# Patient Record
Sex: FEMALE | Race: WHITE | Employment: OTHER | RURAL
[De-identification: names, ages, dates, MRNs, and addresses within clinical notes are randomized per-mention and may not be internally consistent; named-entity substitution may affect disease eponyms.]

---

## 2017-02-10 DIAGNOSIS — I10 ESSENTIAL HYPERTENSION: ICD-10-CM

## 2017-02-10 RX ORDER — LISINOPRIL 20 MG/1
TABLET ORAL
Qty: 30 TAB | Refills: 0 | Status: SHIPPED | OUTPATIENT
Start: 2017-02-10 | End: 2017-03-13 | Stop reason: SDUPTHER

## 2017-03-13 DIAGNOSIS — I10 ESSENTIAL HYPERTENSION: ICD-10-CM

## 2017-03-13 RX ORDER — LISINOPRIL 20 MG/1
TABLET ORAL
Qty: 30 TAB | Refills: 0 | Status: SHIPPED | OUTPATIENT
Start: 2017-03-13 | End: 2017-04-13 | Stop reason: SDUPTHER

## 2017-04-13 DIAGNOSIS — I10 ESSENTIAL HYPERTENSION: ICD-10-CM

## 2017-04-14 RX ORDER — LISINOPRIL 20 MG/1
TABLET ORAL
Qty: 90 TAB | Refills: 1 | Status: SHIPPED | OUTPATIENT
Start: 2017-04-14 | End: 2017-10-09 | Stop reason: SDUPTHER

## 2017-04-25 ENCOUNTER — OFFICE VISIT (OUTPATIENT)
Dept: FAMILY MEDICINE CLINIC | Age: 82
End: 2017-04-25

## 2017-04-25 VITALS
RESPIRATION RATE: 16 BRPM | BODY MASS INDEX: 34.74 KG/M2 | TEMPERATURE: 96.9 F | DIASTOLIC BLOOD PRESSURE: 61 MMHG | OXYGEN SATURATION: 96 % | HEIGHT: 61 IN | WEIGHT: 184 LBS | HEART RATE: 65 BPM | SYSTOLIC BLOOD PRESSURE: 128 MMHG

## 2017-04-25 DIAGNOSIS — E66.9 OBESITY, CLASS II, BMI 35-39.9: ICD-10-CM

## 2017-04-25 DIAGNOSIS — I10 ESSENTIAL HYPERTENSION: ICD-10-CM

## 2017-04-25 DIAGNOSIS — M17.12 PRIMARY OSTEOARTHRITIS OF LEFT KNEE: Primary | ICD-10-CM

## 2017-04-25 NOTE — MR AVS SNAPSHOT
Visit Information Date & Time Provider Department Dept. Phone Encounter #  
 4/25/2017  9:30 AM Pipo Hernandez MD [address] [place], [place] [address]artie Stanhope [id number] Follow-up Instructions Return in about 6 months (around 10/25/2017) for HTN. Upcoming Health Maintenance Date Due OSTEOPOROSIS SCREENING (DEXA) 1/6/1999 MEDICARE YEARLY EXAM 7/20/2017 Pneumococcal 65+ Low/Medium Risk (2 of 2 - PPSV23) 7/22/2017 GLAUCOMA SCREENING Q2Y 4/5/2018 DTaP/Tdap/Td series (2 - Td) 4/22/2022 Allergies as of 4/25/2017  Review Complete On: 4/25/2017 By: Pipo Hernandez MD  
 No Known Allergies Current Immunizations  Reviewed on 10/27/2015 Name Date Influenza Vaccine 10/27/2015, 11/18/2014, 11/12/2013 Influenza Vaccine (Quad) PF 10/4/2016 Influenza Vaccine Split 10/6/2011, 10/29/2010 Pneumococcal Conjugate (PCV-13) 7/22/2016 TDAP Vaccine 4/22/2012  7:52 PM  
  
 Not reviewed this visit You Were Diagnosed With   
  
 Codes Comments Primary osteoarthritis of left knee    -  Primary ICD-10-CM: M17.12 
ICD-9-CM: 715.16 Essential hypertension     ICD-10-CM: I10 
ICD-9-CM: 401.9 Obesity, Class II, BMI 35-39.9 (HCC)     ICD-10-CM: E66.9 ICD-9-CM: 278.00 Vitals BP Pulse Temp Resp Height(growth percentile) Weight(growth percentile) 128/61 (BP 1 Location: Right arm, BP Patient Position: Sitting) 65 96.9 °F (36.1 °C) (Oral) 16 5' 1\" (1.549 m) 184 lb (83.5 kg) LMP SpO2 BMI OB Status Smoking Status 01/01/1986 96% 34.77 kg/m2 Postmenopausal Never Smoker Vitals History BMI and BSA Data Body Mass Index Body Surface Area 34.77 kg/m 2 1.9 m 2 Preferred Pharmacy Pharmacy Name Phone [facility] [address] [facility] [address] [phone] Your Updated Medication List  
  
   
This list is accurate as of: 4/25/17 10:15 AM.  Always use your most recent med list.  
  
  
  
  
 ADULT PROBIOTIC PO Take  by mouth. CURCUMIN Take  by mouth daily. lisinopril 20 mg tablet Commonly known as:  PRINIVIL, ZESTRIL  
TAKE ONE TABLET BY MOUTH ONCE DAILY FOR  HYPERTENSION  
  
 OTHER Ultimate Colon Care Formula PREVNAR 13 (PF) 0.5 mL Syrg injection Generic drug:  pneumococcal 13 karin conj dip  
  
 varicella-zoster vacine live 19,400 unit/0.65 mL Susr injection Generic drug:  varicella zoster vacine live We Performed the Following LIPID PANEL [53165 CPT(R)] METABOLIC PANEL, BASIC [32666 CPT(R)] Follow-up Instructions Return in about 6 months (around 10/25/2017) for HTN. To-Do List   
 07/19/2017 11:00 AM  
  Appointment with Karen Del Valle at [facility] ([phone]) Please provide this summary of care documentation to your next provider. Your primary care clinician is listed as Karin Brantley. If you have any questions after today's visit, please call [phone].

## 2017-04-25 NOTE — PROGRESS NOTES
Reviewed record in preparation for visit and have necessary documentation  Pt did not bring medication to office visit for review  opportunity was given for questions  Goals that were addressed and/or need to be completed during or after this appointment include    Health Maintenance Due   Topic Date Due    OSTEOPOROSIS SCREENING (DEXA)  01/06/1999

## 2017-04-25 NOTE — PROGRESS NOTES
HPI:    Knee Pain  Gil Flores is a 80 y.o. female who presents with left  knee pain. Pain started 1 month ago. Sharp pain and \"popping noise. \" Pain started after she ran out of turmeric. Patient was able to refill medication 1 week ago and since then all pain has resolved. Denies trauma, swelling, erythema or joint. Hypertension: Controlled   BP Readings from Last 3 Encounters:   04/25/17 128/61   10/04/16 139/63   08/16/16 131/53     The patient reports:  taking medications as instructed, no medication side effects noted, no TIA's, no chest pain on exertion, no dyspnea on exertion, no swelling of ankles. Lab Results   Component Value Date/Time    Sodium 136 10/05/2016 01:43 PM    Potassium >10.0 10/05/2016 01:43 PM    Chloride 101 10/05/2016 01:43 PM    CO2 26 10/05/2016 01:43 PM    Anion gap 11 04/22/2012 07:41 PM    Glucose 78 10/05/2016 01:43 PM    BUN 10 10/05/2016 01:43 PM    Creatinine 0.92 10/05/2016 01:43 PM    BUN/Creatinine ratio 11 10/05/2016 01:43 PM    GFR est AA 67 10/05/2016 01:43 PM    GFR est non-AA 58 10/05/2016 01:43 PM    Calcium 10.3 10/05/2016 01:43 PM    Bilirubin, total 0.4 10/05/2016 01:43 PM    AST (SGOT) 19 10/05/2016 01:43 PM    Alk. phosphatase 65 10/05/2016 01:43 PM    Protein, total 6.8 10/05/2016 01:43 PM    Albumin 4.1 10/05/2016 01:43 PM    Globulin 3.2 12/30/2009 08:39 AM    A-G Ratio 1.5 10/05/2016 01:43 PM    ALT (SGPT) 12 10/05/2016 01:43 PM     Our goal is to normalize the blood pressure to decrease the risks of strokes and heart attacks. The patient is in agreement with the plan. Past Medical History:   Diagnosis Date    Hypertension     IBS (irritable bowel syndrome)     Tubular adenoma 10/2/2012       Current Outpatient Prescriptions:     TURMERIC (CURCUMIN), Take  by mouth daily. , Disp: , Rfl:     lisinopril (PRINIVIL, ZESTRIL) 20 mg tablet, TAKE ONE TABLET BY MOUTH ONCE DAILY FOR  HYPERTENSION, Disp: 90 Tab, Rfl: 1    LACTOBACILLUS COMBINATION NO.8 (ADULT PROBIOTIC PO), Take  by mouth., Disp: , Rfl:     OTHER, Ultimate Colon Care Formula , Disp: , Rfl:     VARICELLA-ZOSTER VACINE LIVE 19,400 unit/0.65 mL susr injection, , Disp: , Rfl:     PREVNAR 13, PF, 0.5 mL syrg injection, , Disp: , Rfl:   No Known Allergies  Past Medical History:   Diagnosis Date    Hypertension     IBS (irritable bowel syndrome)     Tubular adenoma 10/2/2012     Family History   Problem Relation Age of Onset    Cancer Mother [de-identified]     breast cancer       ROS: As per HPI otherwise negative. Objective:   Visit Vitals    /61 (BP 1 Location: Right arm, BP Patient Position: Sitting)    Pulse 65    Temp 96.9 °F (36.1 °C) (Oral)    Resp 16    Ht 5' 1\" (1.549 m)    Wt 184 lb (83.5 kg)    LMP 01/01/1986    SpO2 96%    BMI 34.77 kg/m2     Gen: Well appearing. No apparent distress. Alert and oriented. Responds to all questions appropriately. Lungs: No labored respirations. Talking in complete sentences without difficulty.   Musculoskeletal:  Knee: left  Knee Effusion: None  Quadriceps atrophy: None   Popliteal (Bakers) Cyst: Negative   Patellofemoral crepitus: Negative  Q angle:     ROM:  Flexion: 130  Extension: 0   Hip IR/ER: FROM without pain    Alignment:  Foot:   Patellar tracking:     Dynamic Test:  Gait: Normal   Assistive devices: None  One leg squat:     Palpation:   Patella tenderness: None  Patellar tendon tenderness: None  Quad tendon tenderness: None  Medial joint line tenderness: None  Lateral joint line tenderness: None  MCL tenderness: None  LCL tenderness: None  Medial facet tenderness: None  Lateral facet tenderness: None  Condyle tenderness: None  Tibia tubercle tenderness: None  Proximal fibula tenderness: None  Plica tenderness: None  Prepatellar bursa tenderness: None  Pes bursa tenderness: None  ITB tenderness: None    Ligament/Meniscal Exam:  Patellar Grind: Negative   Patellar apprehension (medial/lateral): Negative   Lochman: Negative, with good endpoint   Anterior Drawer: Negative   Posterior Drawer: Negative   Valgus stress: Negative with good endpoint   Varus stress: Negative with good endpoint   Dial test: Negative   Deandre: Negative   Luciano sign: Negative. Strength (0-5/5):   Flexion: Left: 5/5    Right: 5/5    Extension: Left: 5/5    Right: 5/5    Hip abduction: 5/5    Hip adduction: 5/5      Neuro/Vascular : Pulses intact, no edema, and neurologically intact . Skin: No obvious rash or skin breakdown. ASSESSMENT:    ICD-10-CM ICD-9-CM    1. Primary osteoarthritis of left knee M17.12 715.16    2. Essential hypertension M81 762.8 METABOLIC PANEL, BASIC      LIPID PANEL   3. Obesity, Class II, BMI 35-39.9 ([facility]) E66.9 278.00 LIPID PANEL       1. Primary osteoarthritis of left knee  Patient is controlling pain with turmeric. 2. Essential hypertension  Well controlled continue current medication.   - METABOLIC PANEL, BASIC  - LIPID PANEL - non-fasting. 3. Obesity, Class II, BMI 35-39.9 ([facility])  Body mass index is 34.77 kg/(m^2). I have reviewed/discussed the above normal BMI with the patient. I have recommended the following interventions: dietary management education, guidance, and counseling and encourage exercise . Kimberly Line - LIPID PANEL    Follow-up Disposition:  Return in about 6 months (around 10/25/2017) for HTN.

## 2017-04-26 LAB
BUN SERPL-MCNC: 13 MG/DL (ref 8–27)
BUN/CREAT SERPL: 15 (ref 12–28)
CALCIUM SERPL-MCNC: 10.1 MG/DL (ref 8.7–10.3)
CHLORIDE SERPL-SCNC: 103 MMOL/L (ref 96–106)
CHOLEST SERPL-MCNC: 153 MG/DL (ref 100–199)
CO2 SERPL-SCNC: 24 MMOL/L (ref 18–29)
CREAT SERPL-MCNC: 0.84 MG/DL (ref 0.57–1)
GLUCOSE SERPL-MCNC: 73 MG/DL (ref 65–99)
HDLC SERPL-MCNC: 39 MG/DL
LDLC SERPL CALC-MCNC: 80 MG/DL (ref 0–99)
POTASSIUM SERPL-SCNC: 4.5 MMOL/L (ref 3.5–5.2)
SODIUM SERPL-SCNC: 143 MMOL/L (ref 134–144)
TRIGL SERPL-MCNC: 169 MG/DL (ref 0–149)
VLDLC SERPL CALC-MCNC: 34 MG/DL (ref 5–40)

## 2017-05-09 ENCOUNTER — OFFICE VISIT (OUTPATIENT)
Dept: FAMILY MEDICINE CLINIC | Age: 82
End: 2017-05-09

## 2017-05-09 VITALS
WEIGHT: 184 LBS | HEIGHT: 61 IN | RESPIRATION RATE: 20 BRPM | BODY MASS INDEX: 34.74 KG/M2 | TEMPERATURE: 96.5 F | HEART RATE: 101 BPM | SYSTOLIC BLOOD PRESSURE: 131 MMHG | DIASTOLIC BLOOD PRESSURE: 66 MMHG | OXYGEN SATURATION: 95 %
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
## 2017-05-09 DIAGNOSIS — M25.522 ELBOW PAIN, LEFT: ICD-10-CM

## 2017-05-09 DIAGNOSIS — N28.1 BILATERAL RENAL CYSTS: ICD-10-CM

## 2017-05-09 DIAGNOSIS — D32.9 MENINGIOMA (HCC): ICD-10-CM
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
## 2017-05-09 RX ORDER — TRAMADOL HYDROCHLORIDE 50 MG/1
50 TABLET ORAL
Qty: 14 TAB | Refills: 0 | Status: SHIPPED | OUTPATIENT
Start: 2017-05-09 | End: 2017-05-16

## 2017-05-09 NOTE — PROGRESS NOTES
Reviewed record in preparation for visit and have necessary documentation  Pt did not bring medication to office visit for review   Advanced Directives, Living Will on file    Goals that were addressed and/or need to be completed during or after this appointment include   Health Maintenance Due   Topic Date Due    OSTEOPOROSIS SCREENING (DEXA)  01/06/1999

## 2017-05-09 NOTE — PATIENT INSTRUCTIONS
IBUPROFEN: 3 x200mg, three times a day    TYLENOL: 2x 325mg, three times a day       Preventing Falls: Care Instructions  Your Care Instructions  Getting around your home safely can be a challenge if you have injuries or health problems that make it easy for you to fall. Loose rugs and furniture in walkways are among the dangers for many older people who have problems walking or who have poor eyesight. People who have conditions such as arthritis, osteoporosis, or dementia also have to be careful not to fall. You can make your home safer with a few simple measures. Follow-up care is a key part of your treatment and safety. Be sure to make and go to all appointments, and call your doctor if you are having problems. It's also a good idea to know your test results and keep a list of the medicines you take. How can you care for yourself at home? Taking care of yourself  · You may get dizzy if you do not drink enough water. To prevent dehydration, drink plenty of fluids, enough so that your urine is light yellow or clear like water. Choose water and other caffeine-free clear liquids. If you have kidney, heart, or liver disease and have to limit fluids, talk with your doctor before you increase the amount of fluids you drink. · Exercise regularly to improve your strength, muscle tone, and balance. Walk if you can. Swimming may be a good choice if you cannot walk easily. · Have your vision and hearing checked each year or any time you notice a change. If you have trouble seeing and hearing, you might not be able to avoid objects and could lose your balance. · Know the side effects of the medicines you take. Ask your doctor or pharmacist whether the medicines you take can affect your balance. Sleeping pills or sedatives can affect your balance. · Limit the amount of alcohol you drink. Alcohol can impair your balance and other senses. · Ask your doctor whether calluses or corns on your feet need to be removed.  If you wear loose-fitting shoes because of calluses or corns, you can lose your balance and fall. · Talk to your doctor if you have numbness in your feet. Preventing falls at home  · Remove raised doorway thresholds, throw rugs, and clutter. Repair loose carpet or raised areas in the floor. · Move furniture and electrical cords to keep them out of walking paths. · Use nonskid floor wax, and wipe up spills right away, especially on ceramic tile floors. · If you use a walker or cane, put rubber tips on it. If you use crutches, clean the bottoms of them regularly with an abrasive pad, such as steel wool. · Keep your house well lit, especially Fredie Cumins, and outside walkways. Use night-lights in areas such as hallways and bathrooms. Add extra light switches or use remote switches (such as switches that go on or off when you clap your hands) to make it easier to turn lights on if you have to get up during the night. · Install sturdy handrails on stairways. · Move items in your cabinets so that the things you use a lot are on the lower shelves (about waist level). · Keep a cordless phone and a flashlight with new batteries by your bed. If possible, put a phone in each of the main rooms of your house, or carry a cell phone in case you fall and cannot reach a phone. Or, you can wear a device around your neck or wrist. You push a button that sends a signal for help. · Wear low-heeled shoes that fit well and give your feet good support. Use footwear with nonskid soles. Check the heels and soles of your shoes for wear. Repair or replace worn heels or soles. · Do not wear socks without shoes on wood floors. · Walk on the grass when the sidewalks are slippery. If you live in an area that gets snow and ice in the winter, sprinkle salt on slippery steps and sidewalks. Preventing falls in the bath  · Install grab bars and nonskid mats inside and outside your shower or tub and near the toilet and sinks.   · Use shower chairs and bath benches. · Use a hand-held shower head that will allow you to sit while showering. · Get into a tub or shower by putting the weaker leg in first. Get out of a tub or shower with your strong side first.  · Repair loose toilet seats and consider installing a raised toilet seat to make getting on and off the toilet easier. · Keep your bathroom door unlocked while you are in the shower. Where can you learn more? Go to http://pily-brandon.info/. Enter [phone] in the search box to learn more about \"Preventing Falls: Care Instructions. \"  Current as of: August 4, 2016  Content Version: 11.2  © 6978-1349 [employer]. Care instructions adapted under license by [employer] (which disclaims liability or warranty for this information). If you have questions about a medical condition or this instruction, always ask your healthcare professional. [place] [address] any warranty or liability for your use of this information.

## 2017-05-09 NOTE — MR AVS SNAPSHOT
Visit Information Date & Time Provider Department Dept. Phone Encounter #  
 5/9/2017  9:40 AM Afshan Marcus MD [address] [place] [address] [id number] Upcoming Health Maintenance Date Due OSTEOPOROSIS SCREENING (DEXA) 1/6/1999 MEDICARE YEARLY EXAM 7/20/2017 Pneumococcal 65+ Low/Medium Risk (2 of 2 - PPSV23) 7/22/2017 INFLUENZA AGE 9 TO ADULT 8/1/2017 GLAUCOMA SCREENING Q2Y 4/5/2018 DTaP/Tdap/Td series (2 - Td) 4/22/2022 Allergies as of 5/9/2017  Review Complete On: 5/9/2017 By: Agustina Song LPN No Known Allergies Current Immunizations  Reviewed on 10/27/2015 Name Date Influenza Vaccine 10/27/2015, 11/18/2014, 11/12/2013 Influenza Vaccine (Quad) PF 10/4/2016 Influenza Vaccine Split 10/6/2011, 10/29/2010 Pneumococcal Conjugate (PCV-13) 7/22/2016 TDAP Vaccine 4/22/2012  7:52 PM  
 Zoster Vaccine, Live 10/4/2016 Not reviewed this visit Vitals BP Pulse Temp Resp Height(growth percentile) Weight(growth percentile) 131/66 (BP 1 Location: Right arm, BP Patient Position: Sitting) (!) 101 96.5 °F (35.8 °C) (Oral) 20 5' 1\" (1.549 m) 184 lb (83.5 kg) LMP SpO2 BMI OB Status Smoking Status 01/01/1986 95% 34.77 kg/m2 Postmenopausal Never Smoker Vitals History BMI and BSA Data Body Mass Index Body Surface Area 34.77 kg/m 2 1.9 m 2 Preferred Pharmacy Pharmacy Name Phone [facility] [address] [place] [address] [phone] Your Updated Medication List  
  
   
This list is accurate as of: 5/9/17 10:44 AM.  Always use your most recent med list.  
  
  
  
  
 ADULT PROBIOTIC PO Take  by mouth. CURCUMIN Take  by mouth daily. lisinopril 20 mg tablet Commonly known as:  PRINIVIL, ZESTRIL  
TAKE ONE TABLET BY MOUTH ONCE DAILY FOR  HYPERTENSION  
  
 OTHER Ultimate Colon Care Formula  
  
 traMADol 50 mg tablet Commonly known as:  ULTRAM  
Take 1 Tab by mouth nightly as needed for Pain for up to 7 days. Max Daily Amount: 50 mg.  
  
  
  
  
Prescriptions Printed Refills  
 traMADol (ULTRAM) 50 mg tablet 0 Sig: Take 1 Tab by mouth nightly as needed for Pain for up to 7 days. Max Daily Amount: 50 mg.  
 Class: Print Route: Oral  
  
To-Do List   
 07/19/2017 11:00 AM  
  Appointment with Ruth Ann at [address] [place] [address] ([phone]) Patient Instructions IBUPROFEN: 3 x200mg, three times a day TYLENOL: 2x 325mg, three times a day Preventing Falls: Care Instructions Your Care Instructions Getting around your home safely can be a challenge if you have injuries or health problems that make it easy for you to fall. Loose rugs and furniture in walkways are among the dangers for many older people who have problems walking or who have poor eyesight. People who have conditions such as arthritis, osteoporosis, or dementia also have to be careful not to fall. You can make your home safer with a few simple measures. Follow-up care is a key part of your treatment and safety. Be sure to make and go to all appointments, and call your doctor if you are having problems. It's also a good idea to know your test results and keep a list of the medicines you take. How can you care for yourself at home? Taking care of yourself · You may get dizzy if you do not drink enough water. To prevent dehydration, drink plenty of fluids, enough so that your urine is light yellow or clear like water. Choose water and other caffeine-free clear liquids. If you have kidney, heart, or liver disease and have to limit fluids, talk with your doctor before you increase the amount of fluids you drink. · Exercise regularly to improve your strength, muscle tone, and balance. Walk if you can. Swimming may be a good choice if you cannot walk easily. · Have your vision and hearing checked each year or any time you notice a change. If you have trouble seeing and hearing, you might not be able to avoid objects and could lose your balance. · Know the side effects of the medicines you take. Ask your doctor or pharmacist whether the medicines you take can affect your balance. Sleeping pills or sedatives can affect your balance. · Limit the amount of alcohol you drink. Alcohol can impair your balance and other senses. · Ask your doctor whether calluses or corns on your feet need to be removed. If you wear loose-fitting shoes because of calluses or corns, you can lose your balance and fall. · Talk to your doctor if you have numbness in your feet. Preventing falls at home · Remove raised doorway thresholds, throw rugs, and clutter. Repair loose carpet or raised areas in the floor. · Move furniture and electrical cords to keep them out of walking paths. · Use nonskid floor wax, and wipe up spills right away, especially on ceramic tile floors. · If you use a walker or cane, put rubber tips on it. If you use crutches, clean the bottoms of them regularly with an abrasive pad, such as steel wool. · Keep your house well lit, especially Mery Darrian, and outside walkways. Use night-lights in areas such as hallways and bathrooms. Add extra light switches or use remote switches (such as switches that go on or off when you clap your hands) to make it easier to turn lights on if you have to get up during the night. · Install sturdy handrails on stairways. · Move items in your cabinets so that the things you use a lot are on the lower shelves (about waist level). · Keep a cordless phone and a flashlight with new batteries by your bed. If possible, put a phone in each of the main rooms of your house, or carry a cell phone in case you fall and cannot reach a phone. Or, you can wear a device around your neck or wrist. You push a button that sends a signal for help. · Wear low-heeled shoes that fit well and give your feet good support. Use footwear with nonskid soles. Check the heels and soles of your shoes for wear. Repair or replace worn heels or soles. · Do not wear socks without shoes on wood floors. · Walk on the grass when the sidewalks are slippery. If you live in an area that gets snow and ice in the winter, sprinkle salt on slippery steps and sidewalks. Preventing falls in the bath · Install grab bars and nonskid mats inside and outside your shower or tub and near the toilet and sinks. · Use shower chairs and bath benches. · Use a hand-held shower head that will allow you to sit while showering. · Get into a tub or shower by putting the weaker leg in first. Get out of a tub or shower with your strong side first. 
· Repair loose toilet seats and consider installing a raised toilet seat to make getting on and off the toilet easier. · Keep your bathroom door unlocked while you are in the shower. Where can you learn more? Go to http://pily-brandon.info/. Enter [phone] in the search box to learn more about \"Preventing Falls: Care Instructions. \" Current as of: August 4, 2016 Content Version: 11.2 © 0887-8789 [employer]. Care instructions adapted under license by [employer] (which disclaims liability or warranty for this information). If you have questions about a medical condition or this instruction, always ask your healthcare professional. [place] [address] any warranty or liability for your use of this information. Please provide this summary of care documentation to your next provider. Your primary care clinician is listed as Barb Vazuqez. If you have any questions after today's visit, please call [phone].

## 2017-05-10 NOTE — PROGRESS NOTES
Progress Note    Patient: Ranee Goltz MRN: [id number]  SSN: [id number]    YOB: 1934  Age: 80 y.o. Sex: female        Chief Complaint   Patient presents with   [facility] Follow Up    Fall         Subjective:     Patient presents for follow up after ER visit 2 days ago for a fall. She experienced a mechanical fall in the home and was evaluated and treated for left elbow laceration and back pain. CT thoracic spine negative for fractures. Elbow films negative for fractures. Elbow lac closed with nylon sutures and pt advised to f/u in 1 week for removal.  CT head showed meningioma 3cm in diameter. Ct abd showed renal cysts. Patient is doing well. She had not fallen since being home. No fevers of chills. See ROS. Of note, a home safety evaluation was ordered after her  fell and is pending. Her back pain is gone, but she still has elbow pain. She is taking OTCs q4, but she can't remember which ones. The elbow pain just over the lacteration, does not radiate, is 7/10 at its worst, and is keeping her from sleeping but is tolerable at other times. Better w OTCs. Current and past medical information:    Current Medications after this visit[de-identified]     Current Outpatient Prescriptions   Medication Sig    traMADol (ULTRAM) 50 mg tablet Take 1 Tab by mouth nightly as needed for Pain for up to 7 days. Max Daily Amount: 50 mg.  TURMERIC (CURCUMIN) Take  by mouth daily.  lisinopril (PRINIVIL, ZESTRIL) 20 mg tablet TAKE ONE TABLET BY MOUTH ONCE DAILY FOR  HYPERTENSION    LACTOBACILLUS COMBINATION NO.8 (ADULT PROBIOTIC PO) Take  by mouth.  OTHER Ultimate Colon Care Formula      No current facility-administered medications for this visit.         Patient Active Problem List    Diagnosis Date Noted    Actinic keratoses 08/04/2016    Obesity, Class II, BMI 35-39.9 (HCC) 02/18/2016    BMI 35.0-35.9,adult 02/18/2016    Sensorineural hearing loss of both ears 10/03/2014    Meningioma ([facility] [address] 02/08/2013    Tubular adenoma 10/02/2012    Melanosis coli 10/02/2012    Nephrolithiasis 09/27/2012    Cholelithiasis 09/27/2012    Allergic rhinitis 11/15/2010    HTN (hypertension) 11/15/2010       Past Medical History:   Diagnosis Date    Hypertension     IBS (irritable bowel syndrome)     Tubular adenoma 10/2/2012       No Known Allergies    Past Surgical History:   Procedure Laterality Date    ABDOMEN SURGERY PROC UNLISTED         Social History     Social History    Marital status:      Spouse name: N/A    Number of children: N/A    Years of education: N/A     Social History Main Topics    Smoking status: Never Smoker    Smokeless tobacco: None    Alcohol use No    Drug use: No    Sexual activity: Not Asked     Other Topics Concern    None     Social History Narrative       Review of Systems   Constitutional: Negative for chills and fever. HENT: Negative for hearing loss and tinnitus. Respiratory: Negative for cough and shortness of breath. Cardiovascular: Negative for chest pain and palpitations. Neurological: Negative for dizziness, tingling, sensory change, loss of consciousness and headaches. Objective:     Vitals:    05/09/17 0918   BP: 131/66   Pulse: (!) 101   Resp: 20   Temp: 96.5 °F (35.8 °C)   TempSrc: Oral   SpO2: 95%   Weight: 184 lb (83.5 kg)   Height: 5' 1\" (1.549 m)      Body mass index is 34.77 kg/(m^2). Physical Exam   Constitutional: She is oriented to person, place, and time. No distress. HENT:   Head: Normocephalic and atraumatic. Eyes: Pupils are equal, round, and reactive to light. Right eye exhibits no discharge. Left eye exhibits no discharge. No scleral icterus. Cardiovascular: Normal rate and regular rhythm. Exam reveals no gallop and no friction rub. No murmur heard. Pulmonary/Chest: Effort normal. No respiratory distress. She has no wheezes. She has no rales. Abdominal: Soft. She exhibits no distension. There is no tenderness. Musculoskeletal: She exhibits no edema or tenderness. Lymphadenopathy:     She has no cervical adenopathy. Neurological: She is alert and oriented to person, place, and time. Skin: Skin is warm and dry. She is not diaphoretic. Left elbow with non-erythemous dry skin and intact 2cm closure of laceration. No tenderness or warmth. Psychiatric: She has a normal mood and affect. Her behavior is normal.   Nursing note and vitals reviewed. Lab Results   Component Value Date/Time    Creatinine 0.84 04/25/2017 03:42 PM         Assessment and Plan:       ICD-10-CM ICD-9-CM    1. Fall at home, sequela W19. XXXS 909.4     Y92.099 E929.3    2. Meningioma (HCC) D32.9 225.2    3. Bilateral renal cysts N28.1 753.10    4. Elbow pain, left M25.522 719.42 traMADol (ULTRAM) 50 mg tablet     Home is already pending safety eval.    Looking back at previous studies, meninioma is stable. Will discuss when daughter present whether they want to follow up with NSGY. Renal function has been excellent for her age. Tramadol at night only, and follow up in 1 week for suture removal.    Plan of care:  Discussed diagnoses in detail with patient. Medication risks/benefits/side effects discussed with patient. All of the patient's questions were addressed. The patient understands and agrees with our plan of care. The patient knows to call back if they are unsure of or forget any changes we discussed today or if the symptoms change. The patient received an After-Visit Summary which contains VS, orders, medication list and allergy list. This can be used as a \"mini-medical record\" should they have to seek medical care while out of town. Patient Care Team:  Shadia Sethi MD as PCP - General (Family Practice)  Thad Gore MD (Neurosurgery)    Follow-up Disposition:  Return in about 1 week (around 5/16/2017).     Future Appointments  Date Time Provider Kenyatta Vega   7/19/2017 11:00 AM [address]       Signed By: Antony Hdz MD     May 9, 2017

## 2017-05-16 ENCOUNTER — OFFICE VISIT (OUTPATIENT)
Dept: FAMILY MEDICINE CLINIC | Age: 82
End: 2017-05-16

## 2017-05-16 VITALS
WEIGHT: 185 LBS | OXYGEN SATURATION: 95 % | DIASTOLIC BLOOD PRESSURE: 76 MMHG | BODY MASS INDEX: 34.93 KG/M2 | HEIGHT: 61 IN | HEART RATE: 86 BPM | TEMPERATURE: 98.7 F | RESPIRATION RATE: 24 BRPM | SYSTOLIC BLOOD PRESSURE: 136 MMHG
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
## 2017-05-16 DIAGNOSIS — S51.012A LACERATION OF ELBOW, LEFT, INITIAL ENCOUNTER: Primary | ICD-10-CM

## 2017-05-16 NOTE — PROGRESS NOTES
Reviewed record in preparation for visit and have necessary documentation  Pt did not bring medication to office visit for review  Goals that were addressed and/or need to be completed during or after this appointment include     Health Maintenance Due   Topic Date Due    OSTEOPOROSIS SCREENING (DEXA)  01/06/1999

## 2017-05-25 NOTE — PROGRESS NOTES
Patient: Blue García MRN: [id number]  SSN: [id number]    YOB: 1934  Age: 80 y.o. Sex: female      Chief Complaint   Patient presents with    Suture Removal     Blue García is a 80 y.o. female new to me who sustained laceration of left elbow 1 weeks. Nature of injury: patient fell at home. Evaluated and sutured in ED. Patient sans other complaints or concerns at this time. Patient Active Problem List   Diagnosis Code    Allergic rhinitis J30.9    HTN (hypertension) I10    Nephrolithiasis N20.0    Cholelithiasis K80.20    Tubular adenoma D36.9    Melanosis coli K63.89    Meningioma ([address] D32.9    Sensorineural hearing loss of both ears H90.3    Obesity, Class II, BMI 35-39.9 (HCC) E66.9    BMI 35.0-35.9,adult Z68.35    Actinic keratoses L57.0     Past Surgical History:   Procedure Laterality Date    ABDOMEN SURGERY PROC UNLISTED       Social History     Social History    Marital status:      Spouse name: N/A    Number of children: N/A    Years of education: N/A     Occupational History    Not on file. Social History Main Topics    Smoking status: Never Smoker    Smokeless tobacco: Not on file    Alcohol use No    Drug use: No    Sexual activity: Not on file     Other Topics Concern    Not on file     Social History Narrative     Family History   Problem Relation Age of Onset    Cancer Mother [de-identified]     breast cancer     Current Outpatient Prescriptions   Medication Sig    TURMERIC (CURCUMIN) Take  by mouth daily.  lisinopril (PRINIVIL, ZESTRIL) 20 mg tablet TAKE ONE TABLET BY MOUTH ONCE DAILY FOR  HYPERTENSION    LACTOBACILLUS COMBINATION NO.8 (ADULT PROBIOTIC PO) Take  by mouth.  OTHER Ultimate Colon Care Formula      No current facility-administered medications for this visit.       No Known Allergies    Review of Systems  Constitutional: feeling well, negative for fever, chills  Skin: see HPI  Respiratory: negative for cough, wheezing or SOB  Cardiovascular: negative for chest pain, dizziness or palpitations  Musculoskeletal: negative for myalgias or arthralgias   Neurological: negative for numbness, weakness or paresthesia      Vitals:    05/16/17 1533   BP: 136/76   Pulse: 86   Resp: 24   Temp: 98.7 °F (37.1 °C)   TempSrc: Oral   SpO2: 95%   Weight: 185 lb (83.9 kg)   Height: 5' 1\" (1.549 m)       Physical Examination:  General: Well developed, well nourished in no acute distress  Skin: laceration of left elbow healing well sans sign of infection  Head: Normocephalic, atraumatic  Eyes: Sclera clear, EOMI  Neck: Normal range of motion  Respiratory: symmetrical, unlabored effort  Cardiovascular:  Regular rate and rhythm  Extremities: Full range of motion, no edema  Neurology: Active, alert and oriented, No focal deficits  Psych: Affect appropriate     Ray Garcia was seen today for suture removal.    Diagnoses and all orders for this visit:    Laceration of elbow, left, initial encounter  -     REMOVAL OF SUTURES    Fall, initial encounter        PLAN:   Sutures removed sans problem. Patient tolerated well. I have discussed the diagnosis with the patient and the intended plan as seen in the above orders. The patient expresses understanding and agreement with our plan of care. The patient has received an after-visit summary. The patient knows to call our office if there are any questions or concerns regarding diagnosis and treatment plans. I have discussed medication side effects and warnings with the patient as well. Follow-up Disposition:  Return if symptoms worsen or fail to improve.

## 2017-07-19 ENCOUNTER — OFFICE VISIT (OUTPATIENT)
Dept: FAMILY MEDICINE CLINIC | Age: 82
End: 2017-07-19

## 2017-07-19 VITALS
RESPIRATION RATE: 20 BRPM | HEART RATE: 72 BPM | SYSTOLIC BLOOD PRESSURE: 119 MMHG | TEMPERATURE: 98.8 F | DIASTOLIC BLOOD PRESSURE: 52 MMHG | WEIGHT: 185.2 LBS | HEIGHT: 61 IN | BODY MASS INDEX: 34.97 KG/M2 | OXYGEN SATURATION: 95 %

## 2017-07-19 DIAGNOSIS — Z00.00 MEDICARE ANNUAL WELLNESS VISIT, SUBSEQUENT: Primary | ICD-10-CM

## 2017-07-19 DIAGNOSIS — Z13.39 SCREENING FOR ALCOHOLISM: ICD-10-CM

## 2017-07-19 RX ORDER — CHOLECALCIFEROL (VITAMIN D3) 125 MCG
100 CAPSULE ORAL DAILY
COMMUNITY
End: 2018-05-14

## 2017-07-19 RX ORDER — LANOLIN ALCOHOL/MO/W.PET/CERES
500 CREAM (GRAM) TOPICAL DAILY
COMMUNITY
End: 2019-06-19

## 2017-07-19 RX ORDER — NAPHAZOLINE HCL/PHENIRAMINE .0268-.315
DROPS OPHTHALMIC (EYE)
COMMUNITY
End: 2018-05-14

## 2017-07-19 RX ORDER — CHOLECALCIFEROL TAB 125 MCG (5000 UNIT) 125 MCG
1000 TAB ORAL DAILY
COMMUNITY

## 2017-07-19 RX ORDER — MULTIVITAMIN
TABLET ORAL
COMMUNITY

## 2017-07-19 RX ORDER — ACETAMINOPHEN, DIPHENHYDRAMINE HCL, PHENYLEPHRINE HCL 325; 25; 5 MG/1; MG/1; MG/1
TABLET ORAL
COMMUNITY
End: 2019-06-19

## 2017-07-19 RX ORDER — AMPICILLIN TRIHYDRATE 250 MG
600 CAPSULE ORAL
COMMUNITY
End: 2018-05-14

## 2017-07-19 RX ORDER — GUAIFENESIN 100 MG/5ML
81 LIQUID (ML) ORAL DAILY
COMMUNITY

## 2017-07-19 RX ORDER — GARLIC 1000 MG
CAPSULE ORAL
COMMUNITY
End: 2019-07-08

## 2017-07-19 NOTE — PATIENT INSTRUCTIONS
Medicare Part B Preventive Services Limitations Recommendation Scheduled   Bone Mass Measurement  (age 72 & older, biennial) Requires diagnosis related to osteoporosis or estrogen deficiency. Biennial benefit unless patient has history of long-term glucocorticoid tx or baseline is needed because initial test was by other method Last: unknown    A DEXA scan is recommended after you turn 72years of age to determine your risk for osteoporosis Next: As recommended by your physician   Colorectal Cancer Screening  -Fecal occult blood test (annual)  -Flexible sigmoidoscopy (5y)  -Screening colonoscopy (10y)  -Barium Enema  Last: 2012    Every 5-10 years depending on your colonoscopy result starting at age 48 years Next: NA   Glaucoma Screening (no USPSTF recommendation) Diabetes mellitus, family history, , age 48 or over,  American, age 72 or over Last: 4/15/16    Every year Next: 4/2017 to 4/2018   Screening Mammography (biennial age 54-69) Annually (age 36 or over) Last: 6/25/13 Next: stopped going due to her age   Screening Pap Tests and Pelvic Examination (up to age 72 and after 72 if unknown history or abnormal study last 10 years) Every 24 months except high risk Last: NA Next:    Discuss with your doctor if this screening is appropriate for you. Cardiovascular Screening Blood Tests (every 5 years)  Total cholesterol, HDL, Triglycerides Order as a panel if possible Last: 4/25/17    Every Year Next: 4/2018   Diabetes Screening Tests (at least every 3 years, Medicare covers annually or at 6-month intervals for prediabetic patients)    Fasting blood sugar (FBS) or glucose tolerance test (GTT) Patient must be diagnosed with one of the following:  -Hypertension, Dyslipidemia, obesity, previous impaired FBS or GTT  Or any two of the following: overweight, FH of diabetes, age ? 72, history of gestational diabetes, birth of baby weighing more than 9 pounds Last: 4/25/17    Diabetic: Every 3-6 months depending on your result    Non-Diabetic: Every 3 years    Next: Check with yearly labs   Diabetes Self-Management Training (DSMT) (no USPSTF recommendation) Requires referral by treating physician for patient with diabetes or renal disease. 10 hours of initial DSMT session of no less than 30 minutes each in a continuous 12-month period. 2 hours of follow-up DSMT in subsequent years. Last: NA Talk to your doctor if you are interested in a refresher course. Medical Nutrition Therapy (MNT) (for diabetes or renal disease not recommended schedule) Requires referral by treating physician for patient with diabetes or renal disease. Can be provided in same year as diabetes self-management training (DSMT), and CMS recommends medical nutrition therapy take place after DSMT. Up to 3 hours for initial year and 2 hours in subsequent years. Last: NA Talk to your doctor if you are interested in a refresher course. Seasonal Influenza Vaccination (annually)  Last: 10/4/16    Every Fall Please get one this Fall. Pneumococcal Vaccination (once after 72)  Last:  Pneumovax:    Prevnar-13:  7/22/16 Next: You will be due for a Pneumovax-23 after 7/23/17   Shingles Vaccination  Last: 10/4/16    A shingles vaccine is recommended once in a lifetime after age 61 Complete   Tetanus, Diphtheria and Pertussis (Tdap) Vaccination Booster One Booster as an adult and then tetanus every 10 years or as indicated Last: 4/22/12   Complete   Hepatitis B Vaccinations (if medium/high risk) Medium/high risk factors:  End-stage renal disease,  Hemophiliacs who received Factor VIII or IX concentrates, Clients of institutions for the mentally retarded, Persons who live in the same house as a HepB virus carrier, Homosexual men, Illicit injectable drug abusers.  Last: NA Next: NA   Counseling to Prevent Tobacco Use (up to 8 sessions per year)  - Counseling greater than 3 and up to 10 minutes  - Counseling greater than 10 minutes Patients must be asymptomatic of tobacco-related conditions to receive as preventive service Last: NA Next: NA   Human Immunodeficiency Virus (HIV) Screening (annually for increased risk patients)  HIV-1 and HIV-2 by EIA, LIO, rapid antibody test, or oral mucosa transudate Patient must be at increased risk for HIV infection per USPSTF guidelines or pregnant. Tests covered annually for patients at increased risk. Pregnant patients may receive up to 3 test during pregnancy. Last: NA Next: NA   Ultrasound Screening for Abdominal Aortic Aneurysm (AAA) once Patient must be referred through IPPE and not have had a screening for abdominal aortic aneurysm before under medicare. Limited to patients who meet onf of the following criteria:  -Men who are 73-68 years old and have smoked more than 100 cigarettes in their lifetime  -Anyone with a FH of AAA  -Anyone recommended for screening by the USPSFTF As recommended by your PCP or Specialist   As recommended by your PCP or Specialist     NA = Not Applicable; NI= Not Indicated     1) Consider making an eye appointment in the next few months    2) Get a Pneumovax-23 vaccine this fall when you get your flu shot.

## 2017-07-19 NOTE — PROGRESS NOTES
Dr. Noreen Whaley referred Renay Phelan, 1934, a 80 y.o. female for a Medicare Annual Wellness Visit (AWV)      This is a Subsequent Medicare Annual Wellness Visit providing Personalized Prevention Plan Services (PPPS) (Performed 12 months after initial AWV and PPPS )    I have reviewed the patient's medical history in detail and updated the computerized patient record. History     Past Medical History:   Diagnosis Date    Hypertension     IBS (irritable bowel syndrome)     Tubular adenoma 10/2/2012      Past Surgical History:   Procedure Laterality Date    ABDOMEN SURGERY PROC UNLISTED       Current Outpatient Prescriptions   Medication Sig Dispense Refill    FLAXSEED OIL (OMEGA 3 PO) Take  by mouth.  TURMERIC ROOT EXTRACT PO Take  by mouth.  garlic 6,278 mg cap Take  by mouth.  co-enzyme Q-10 (CO Q-10) 100 mg capsule Take 100 mg by mouth daily.  ASCORBIC ACID (C-500 PO) Take  by mouth.  melatonin 10 mg tab Take  by mouth.  potassium 99 mg tablet Take 99 mg by mouth daily.  VITS A,C,E/LUTEIN/MINERALS (HEALTHY EYES PO) Take  by mouth.  MILK THISTLE PO Take  by mouth.  cyanocobalamin (B-12 DOTS) 500 mcg tablet Take 500 mcg by mouth daily.  RESVERATROL PO Take  by mouth.  cinnamon bark (CINNAMON) 500 mg cap Take  by mouth.  OTHER       aspirin 81 mg chewable tablet Take 81 mg by mouth daily.  OTHER       red yeast rice extract 600 mg cap Take 600 mg by mouth now.  cholecalciferol, VITAMIN D3, (VITAMIN D3) 5,000 unit tab tablet Take  by mouth daily.  BOSWELLIA DEB EXTRACT (BOSWELLIA DEB XT, BULK,) by Does Not Apply route.  BILBERRY PO Take  by mouth.  valerian root 450 mg cap Take  by mouth.  LACTASE (LACTAID PO) Take  by mouth.  GYMNEMA LEAF, BULK, by Does Not Apply route.  CL ROOT, BULK, by Does Not Apply route.  TURMERIC (CURCUMIN) Take  by mouth daily.       lisinopril (PRINIVIL, ZESTRIL) 20 mg tablet TAKE ONE TABLET BY MOUTH ONCE DAILY FOR  HYPERTENSION 90 Tab 1    LACTOBACILLUS COMBINATION NO.8 (ADULT PROBIOTIC PO) Take  by mouth.  OTHER Ultimate Colon Care Formula        No Known Allergies  Family History   Problem Relation Age of Onset    Cancer Mother [de-identified]     breast cancer    Alcohol abuse Father      Social History   Substance Use Topics    Smoking status: Never Smoker    Smokeless tobacco: Never Used    Alcohol use No     Patient Active Problem List   Diagnosis Code    Allergic rhinitis J30.9    HTN (hypertension) I10    Nephrolithiasis N20.0    Cholelithiasis K80.20    Tubular adenoma D36.9    Melanosis coli K63.89    Meningioma ([address] D32.9    Sensorineural hearing loss of both ears H90.3    Obesity, Class II, BMI 35-39.9 (HCC) E66.9    BMI 35.0-35.9,adult Z68.35    Actinic keratoses L57.0       Depression Risk Factor Screening:     PHQ over the last two weeks 7/19/2017   Little interest or pleasure in doing things Not at all   Feeling down, depressed or hopeless Not at all   Total Score PHQ 2 0     Alcohol Risk Factor Screening: On any occasion during the past 3 months, have you had more than 3 drinks containing alcohol? No    Do you average more than 7 drinks per week? No        Functional Ability and Level of Safety:     Hearing Loss   normal-to-mild    Activities of Daily Living   Self-care.    Requires assistance with: no ADLs    ADL Assessment 7/19/2017   Feeding yourself No Help Needed   Getting from bed to chair No Help Needed   Getting dressed No Help Needed   Bathing or showering No Help Needed   Walk across the room (includes cane/walker) No Help Needed   Using the telphone No Help Needed   Taking your medications No Help Needed   Preparing meals No Help Needed   Managing money (expenses/bills) No Help Needed   Moderately strenuous housework (laundry) No Help Needed   Shopping for personal items (toiletries/medicines) No Help Needed Shopping for groceries No Help Needed   Driving No Help Needed   Climbing a flight of stairs No Help Needed   Getting to places beyond walking distances No Help Needed       Fall Risk   Fall Risk Assessment, last 12 mths 7/19/2017   Able to walk? Yes   Fall in past 12 months? No   Fall with injury? -   Number of falls in past 12 months -   Fall Risk Score -     Abuse Screen   Patient is not abused    Abuse Screening Questionnaire 7/19/2017   Do you ever feel afraid of your partner? N   Are you in a relationship with someone who physically or mentally threatens you? N   Is it safe for you to go home? Y       Review of Systems   Not required    Physical Examination     Evaluation of Cognitive Function:  Mood/affect:  happy  Appearance: age appropriate and casually dressed  Family member/caregiver input: none present    No exam performed today, not required for AWV. Patient Care Team:  Olman Adams MD as PCP - General (Family Practice)  Beryle Rumple, MD (Neurosurgery)    Advice/Referrals/Counseling   Education and counseling provided:  End-of-Life planning (with patient's consent)  Pneumococcal Vaccine  Influenza Vaccine  Screening Mammography  Screening Pap and pelvic (covered once every 2 years)  Colorectal cancer screening tests  Cardiovascular screening blood test  Bone mass measurement (DEXA)  Screening for glaucoma  Diabetes screening test  Tdap and Zostavax      Assessment/Plan       ICD-10-CM ICD-9-CM    1. Medicare annual wellness visit, subsequent Z00.00 V70.0    2. Screening for alcoholism Z13.89 V79.1    3. BMI 34.0-34.9,adult Z68.34 V85.34      4. Immunizations: Patient confirmed the following records of vaccinations are correct and current.   Immunization History   Administered Date(s) Administered    Influenza Vaccine 11/12/2013, 11/18/2014, 10/27/2015    Influenza Vaccine (Quad) PF 10/04/2016    Influenza Vaccine Split 10/29/2010, 10/06/2011    Pneumococcal Conjugate (PCV-13) 07/22/2016    TDAP Vaccine 04/22/2012    Zoster Vaccine, Live 10/04/2016   Encouraged patient to get a flu shot this fall and to have the Pneumovax-23 at the same time. 5. Screenings: See chart in patient instructions for specific information. ADL's, Fall Risk, Depression Screening and Abuse screening information is reported above. Advised patient to schedule an eye exam within the next few months. 6. Medication Reconciliation was performed today and 23 herbal products were added to patients regimen. Spent time educating patient on the dangers of so many herbal products. These were not recommended for her to take but something she has received in the mail and continues to purchase given that they are buy 2 get 3 free. We specifically talked about low blood sugars/cinnamon and falls/valerian root. 7. Advanced Care Plan: on file        Patient verbalized understanding of information presented. Answered all of the patient's questions. AVS handed and reviewed with patient which includes a Medicare Wellness Preventative Screening Table and patient specific information. Notification of recommendations will be sent to Dr. Juventino Toure for review. Dewayne Garcia, PharmD, BCPS, CDE    .

## 2017-07-19 NOTE — MR AVS SNAPSHOT
Visit Information Date & Time Provider Department Dept. Phone Encounter #  
 7/19/2017 11:00 AM Benjie [phone] [id number] Your Appointments 10/26/2017  8:50 AM  
ROUTINE CARE with Candace Ellis MD  
[facility]) Appt Note: 6 month f/u-Hypertension[facility]letter [facility] [address] [address]  
[address] [address] Upcoming Health Maintenance Date Due OSTEOPOROSIS SCREENING (DEXA) 1/6/1999 MEDICARE YEARLY EXAM 7/20/2017 Pneumococcal 65+ Low/Medium Risk (2 of 2 - PPSV23) 7/22/2017 INFLUENZA AGE 9 TO ADULT 8/1/2017 GLAUCOMA SCREENING Q2Y 4/5/2018 DTaP/Tdap/Td series (2 - Td) 4/22/2022 Allergies as of 7/19/2017  Review Complete On: 7/19/2017 By: Freya Lam LPN No Known Allergies Current Immunizations  Reviewed on 10/27/2015 Name Date Influenza Vaccine 10/27/2015, 11/18/2014, 11/12/2013 Influenza Vaccine (Quad) PF 10/4/2016 Influenza Vaccine Split 10/6/2011, 10/29/2010 Pneumococcal Conjugate (PCV-13) 7/22/2016 TDAP Vaccine 4/22/2012  7:52 PM  
 Zoster Vaccine, Live 10/4/2016 Not reviewed this visit Vitals BP Pulse Temp Resp Height(growth percentile) Weight(growth percentile) 119/52 (BP 1 Location: Right arm, BP Patient Position: Sitting) 72 98.8 °F (37.1 °C) (Oral) 20 5' 1\" (1.549 m) 185 lb 3.2 oz (84 kg) LMP SpO2 BMI OB Status Smoking Status 01/01/1986 95% 34.99 kg/m2 Postmenopausal Never Smoker Vitals History BMI and BSA Data Body Mass Index Body Surface Area 34.99 kg/m 2 1.9 m 2 Preferred Pharmacy Pharmacy Name Phone [facility] [address] [facility] [address] [phone] Your Updated Medication List  
  
   
 This list is accurate as of: 7/19/17 11:12 AM.  Always use your most recent med list.  
  
  
  
  
 ADULT PROBIOTIC PO Take  by mouth. aspirin 81 mg chewable tablet Take 81 mg by mouth daily. B-12 DOTS 500 mcg tablet Generic drug:  cyanocobalamin Take 500 mcg by mouth daily. BILBERRY PO Take  by mouth. BOSWELLIA DEB XT (BULK)  
by Does Not Apply route. C-500 PO Take  by mouth. cholecalciferol (VITAMIN D3) 5,000 unit Tab tablet Commonly known as:  VITAMIN D3 Take  by mouth daily. CINNAMON 500 mg Cap Generic drug:  cinnamon bark Take  by mouth. CO Q-10 100 mg capsule Generic drug:  co-enzyme Q-10 Take 100 mg by mouth daily. CURCUMIN Take  by mouth daily. garlic 9,248 mg Cap Take  by mouth. CL ROOT (BULK)  
by Does Not Apply route. GYMNEMA LEAF (BULK)  
by Does Not Apply route. HEALTHY EYES PO Take  by mouth. LACTAID PO Take  by mouth.  
  
 lisinopril 20 mg tablet Commonly known as:  PRINIVIL, ZESTRIL  
TAKE ONE TABLET BY MOUTH ONCE DAILY FOR  HYPERTENSION  
  
 melatonin 10 mg Tab Take  by mouth. MILK THISTLE PO Take  by mouth. OMEGA 3 PO Take  by mouth. * OTHER Ultimate Colon Care Formula * OTHER  
  
 * OTHER  
  
 potassium 99 mg tablet Take 99 mg by mouth daily. red yeast rice extract 600 mg Cap Take 600 mg by mouth now. RESVERATROL PO Take  by mouth. TURMERIC ROOT EXTRACT PO Take  by mouth.  
  
 valerian root 450 mg Cap Take  by mouth. * Notice: This list has 3 medication(s) that are the same as other medications prescribed for you. Read the directions carefully, and ask your doctor or other care provider to review them with you. Patient Instructions Medicare Part B Preventive Services Limitations Recommendation Scheduled Bone Mass Measurement (age 72 & older, biennial) Requires diagnosis related to osteoporosis or estrogen deficiency. Biennial benefit unless patient has history of long-term glucocorticoid tx or baseline is needed because initial test was by other method Last: unknown A DEXA scan is recommended after you turn 72years of age to determine your risk for osteoporosis Next: As recommended by your physician Colorectal Cancer Screening 
-Fecal occult blood test (annual) -Flexible sigmoidoscopy (5y) 
-Screening colonoscopy (10y) -Barium Enema  Last: 2012 Every 5-10 years depending on your colonoscopy result starting at age 48 years Next: NA  
Glaucoma Screening (no USPSTF recommendation) Diabetes mellitus, family history, , age 48 or over,  American, age 72 or over Last: 4/15/16 Every year Next: 4/2017 to 4/2018 Screening Mammography (biennial age 54-69) Annually (age 36 or over) Last: 6/25/13 Next: stopped going due to her age Screening Pap Tests and Pelvic Examination (up to age 72 and after 72 if unknown history or abnormal study last 10 years) Every 24 months except high risk Last: NA Next: 
 
Discuss with your doctor if this screening is appropriate for you. Cardiovascular Screening Blood Tests (every 5 years) Total cholesterol, HDL, Triglycerides Order as a panel if possible Last: 4/25/17 Every Year Next: 4/2018 Diabetes Screening Tests (at least every 3 years, Medicare covers annually or at 6-month intervals for prediabetic patients) Fasting blood sugar (FBS) or glucose tolerance test (GTT) Patient must be diagnosed with one of the following: 
-Hypertension, Dyslipidemia, obesity, previous impaired FBS or GTT 
Or any two of the following: overweight, FH of diabetes, age ? 72, history of gestational diabetes, birth of baby weighing more than 9 pounds Last: 4/25/17 Diabetic: Every 3-6 months depending on your result Non-Diabetic: Every 3 years Next: Check with yearly labs Diabetes Self-Management Training (DSMT) (no USPSTF recommendation) Requires referral by treating physician for patient with diabetes or renal disease. 10 hours of initial DSMT session of no less than 30 minutes each in a continuous 12-month period. 2 hours of follow-up DSMT in subsequent years. Last: NA Talk to your doctor if you are interested in a refresher course. Medical Nutrition Therapy (MNT) (for diabetes or renal disease not recommended schedule) Requires referral by treating physician for patient with diabetes or renal disease. Can be provided in same year as diabetes self-management training (DSMT), and CMS recommends medical nutrition therapy take place after DSMT. Up to 3 hours for initial year and 2 hours in subsequent years. Last: NA Talk to your doctor if you are interested in a refresher course. Seasonal Influenza Vaccination (annually)  Last: 10/4/16 Every Fall Please get one this Fall. Pneumococcal Vaccination (once after 65)  Last: 
Pneumovax: 
 
Prevnar-13: 
7/22/16 Next: You will be due for a Pneumovax-23 after 7/23/17 Shingles Vaccination  Last: 10/4/16 A shingles vaccine is recommended once in a lifetime after age 61 Complete Tetanus, Diphtheria and Pertussis (Tdap) Vaccination Booster One Booster as an adult and then tetanus every 10 years or as indicated Last: 4/22/12 Complete Hepatitis B Vaccinations (if medium/high risk) Medium/high risk factors:  End-stage renal disease, Hemophiliacs who received Factor VIII or IX concentrates, Clients of institutions for the mentally retarded, Persons who live in the same house as a HepB virus carrier, Homosexual men, Illicit injectable drug abusers. Last: NA Next: NA  
Counseling to Prevent Tobacco Use (up to 8 sessions per year) - Counseling greater than 3 and up to 10 minutes - Counseling greater than 10 minutes Patients must be asymptomatic of tobacco-related conditions to receive as preventive service Last: NA Next: NA  
 Human Immunodeficiency Virus (HIV) Screening (annually for increased risk patients) HIV-1 and HIV-2 by EIA, LIO, rapid antibody test, or oral mucosa transudate Patient must be at increased risk for HIV infection per USPSTF guidelines or pregnant. Tests covered annually for patients at increased risk. Pregnant patients may receive up to 3 test during pregnancy. Last: NA Next: NA Ultrasound Screening for Abdominal Aortic Aneurysm (AAA) once Patient must be referred through IPPE and not have had a screening for abdominal aortic aneurysm before under medicare. Limited to patients who meet onf of the following criteria: 
-Men who are 73-68 years old and have smoked more than 100 cigarettes in their lifetime 
-Anyone with a FH of AAA 
-Anyone recommended for screening by the USPSFTF As recommended by your PCP or Specialist 
 As recommended by your PCP or Specialist 
  
NA = Not Applicable; NI= Not Indicated 1) Consider making an eye appointment in the next few months 2) Get a Pneumovax-23 vaccine this fall when you get your flu shot. Please provide this summary of care documentation to your next provider. Your primary care clinician is listed as Elayne Villegas. If you have any questions after today's visit, please call [phone].

## 2017-07-19 NOTE — PROGRESS NOTES
Chief Complaint   Patient presents with    Annual Wellness Visit     Body mass index is 34.99 kg/(m^2).     Reviewed record in preparation for visit and have necessary documentation  Pt did not bring medication to office visit for review  Information was given to pt on Advanced Directives, Living Will  Information was given on Shingles Vaccine  Opportunity was given for questions  Goals that were addressed and/or need to be completed after this appointment include:     Health Maintenance Due   Topic Date Due    OSTEOPOROSIS SCREENING (DEXA)  01/06/1999

## 2017-09-14 ENCOUNTER — CLINICAL SUPPORT (OUTPATIENT)
Dept: FAMILY MEDICINE CLINIC | Age: 82
End: 2017-09-14

## 2017-09-14 DIAGNOSIS — Z23 ENCOUNTER FOR IMMUNIZATION: ICD-10-CM

## 2017-09-14 NOTE — PROGRESS NOTES
Lala Modi is a 80 y.o. female who presents for routine immunizations. She denies any symptoms , reactions or allergies that would exclude them from being immunized today. Risks and adverse reactions were discussed and the VIS was given to them. All questions were addressed. . There were no reactions observed.     Divina Sheikh LPN

## 2017-09-14 NOTE — MR AVS SNAPSHOT
Visit Information Date & Time Provider Department Dept. Phone Encounter #  
 9/14/2017  2:20 PM Cande Patel MD [address] [id number] Your Appointments 10/26/2017  8:50 AM  
ROUTINE CARE with Ac Fermin MD  
[facility] ([facility]) Appt Note: 6 month f/u-Hypertension-letter mailed [facility] [address] [address]  
[place] [address] [address] Upcoming Health Maintenance Date Due OSTEOPOROSIS SCREENING (DEXA) 1/6/1999 Pneumococcal 65+ Low/Medium Risk (2 of 2 - PPSV23) 7/22/2017 INFLUENZA AGE 9 TO ADULT 8/1/2017 GLAUCOMA SCREENING Q2Y 4/5/2018 MEDICARE YEARLY EXAM 7/20/2018 DTaP/Tdap/Td series (2 - Td) 4/22/2022 COLONOSCOPY 8/10/2027 Allergies as of 9/14/2017  Review Complete On: 7/19/2017 By: Olman Goff LPN No Known Allergies Current Immunizations  Reviewed on 10/27/2015 Name Date Influenza Vaccine 10/27/2015, 11/18/2014, 11/12/2013 Influenza Vaccine (Quad) PF 10/4/2016 Influenza Vaccine Split 10/6/2011, 10/29/2010 Pneumococcal Conjugate (PCV-13) 7/22/2016 TDAP Vaccine 4/22/2012  7:52 PM  
 Zoster Vaccine, Live 10/4/2016 Not reviewed this visit You Were Diagnosed With   
  
 Codes Comments Encounter for immunization     ICD-10-CM: J86 ICD-9-CM: V03.89 Vitals LMP OB Status Smoking Status 01/01/1986 Postmenopausal Never Smoker Preferred Pharmacy Pharmacy Name Phone [facility] [address] [address] [phone] Your Updated Medication List  
  
   
This list is accurate as of: 9/14/17  2:52 PM.  Always use your most recent med list.  
  
  
  
  
 ADULT PROBIOTIC PO Take  by mouth. aspirin 81 mg chewable tablet Take 81 mg by mouth daily. B-12 DOTS 500 mcg tablet Generic drug:  cyanocobalamin Take 500 mcg by mouth daily. BILBERRY PO Take  by mouth. BOSWELLIA DEB XT (BULK)  
by Does Not Apply route. C-500 PO Take  by mouth. cholecalciferol (VITAMIN D3) 5,000 unit Tab tablet Commonly known as:  VITAMIN D3 Take  by mouth daily. CINNAMON 500 mg Cap Generic drug:  cinnamon bark Take  by mouth. CO Q-10 100 mg capsule Generic drug:  co-enzyme Q-10 Take 100 mg by mouth daily. CURCUMIN Take  by mouth daily. garlic 1,166 mg Cap Take  by mouth. CL ROOT (BULK)  
by Does Not Apply route. GYMNEMA LEAF (BULK)  
by Does Not Apply route. HEALTHY EYES PO Take  by mouth. LACTAID PO Take  by mouth.  
  
 lisinopril 20 mg tablet Commonly known as:  PRINIVIL, ZESTRIL  
TAKE ONE TABLET BY MOUTH ONCE DAILY FOR  HYPERTENSION  
  
 melatonin 10 mg Tab Take  by mouth. MILK THISTLE PO Take  by mouth. OMEGA 3 PO Take  by mouth. * OTHER Ultimate Colon Care Formula * OTHER  
  
 * OTHER  
  
 potassium 99 mg tablet Take 99 mg by mouth daily. red yeast rice extract 600 mg Cap Take 600 mg by mouth now. RESVERATROL PO Take  by mouth. TURMERIC ROOT EXTRACT PO Take  by mouth.  
  
 valerian root 450 mg Cap Take  by mouth. * Notice: This list has 3 medication(s) that are the same as other medications prescribed for you. Read the directions carefully, and ask your doctor or other care provider to review them with you. Patient Instructions Influenza (Flu) Vaccine (Inactivated or Recombinant): What You Need to Know Why get vaccinated? Influenza (\"flu\") is a contagious disease that spreads around the [place] every winter, usually between October and May. Flu is caused by influenza viruses and is spread mainly by coughing, sneezing, and close contact. Anyone can get flu. Flu strikes suddenly and can last several days. Symptoms vary by age, but can include: · Fever/chills. · Sore throat. · Muscle aches. · Fatigue. · Cough. · Headache. · Runny or stuffy nose. Flu can also lead to pneumonia and blood infections, and cause diarrhea and seizures in children. If you have a medical condition, such as heart or lung disease, flu can make it worse. Flu is more dangerous for some people. Infants and young children, people 72years of age and older, pregnant women, and people with certain health conditions or a weakened immune system are at greatest risk. Each year thousands of people in the [facility] die from flu, and many more are hospitalized. Flu vaccine can: · Keep you from getting flu. · Make flu less severe if you do get it. · Keep you from spreading flu to your family and other people. Inactivated and recombinant flu vaccines A dose of flu vaccine is recommended every flu season. Children 6 months through 6years of age may need two doses during the same flu season. Everyone else needs only one dose each flu season. Some inactivated flu vaccines contain a very small amount of a mercury-based preservative called thimerosal. Studies have not shown thimerosal in vaccines to be harmful, but flu vaccines that do not contain thimerosal are available. There is no live flu virus in flu shots. They cannot cause the flu. There are many flu viruses, and they are always changing. Each year a new flu vaccine is made to protect against three or four viruses that are likely to cause disease in the upcoming flu season. But even when the vaccine doesn't exactly match these viruses, it may still provide some protection. Flu vaccine cannot prevent: · Flu that is caused by a virus not covered by the vaccine. · Illnesses that look like flu but are not. Some people should not get this vaccine Tell the person who is giving you the vaccine: · If you have any severe (life-threatening) allergies. If you ever had a life-threatening allergic reaction after a dose of flu vaccine, or have a severe allergy to any part of this vaccine, you may be advised not to get vaccinated. Most, but not all, types of flu vaccine contain a small amount of egg protein. · If you ever had Guillain-Barré syndrome (also called GBS) Some people with a history of GBS should not get this vaccine. This should be discussed with your doctor. · If you are not feeling well. It is usually okay to get flu vaccine when you have a mild illness, but you might be asked to come back when you feel better. Risks of a vaccine reaction With any medicine, including vaccines, there is a chance of reactions. These are usually mild and go away on their own, but serious reactions are also possible. Most people who get a flu shot do not have any problems with it. Minor problems following a flu shot include: · Soreness, redness, or swelling where the shot was given · Hoarseness · Sore, red or itchy eyes · Cough · Fever · Aches · Headache · Itching · Fatigue If these problems occur, they usually begin soon after the shot and last 1 or 2 days. More serious problems following a flu shot can include the following: · There may be a small increased risk of Guillain-Barré Syndrome (GBS) after inactivated flu vaccine. This risk has been estimated at 1 or 2 additional cases per million people vaccinated. This is much lower than the risk of severe complications from flu, which can be prevented by flu vaccine. · Siva Mourning children who get the flu shot along with pneumococcal vaccine (PCV13) and/or DTaP vaccine at the same time might be slightly more likely to have a seizure caused by fever. Ask your doctor for more information. Tell your doctor if a child who is getting flu vaccine has ever had a seizure Problems that could happen after any injected vaccine: · People sometimes faint after a medical procedure, including vaccination. Sitting or lying down for about 15 minutes can help prevent fainting, and injuries caused by a fall. Tell your doctor if you feel dizzy, or have vision changes or ringing in the ears. · Some people get severe pain in the shoulder and have difficulty moving the arm where a shot was given. This happens very rarely. · Any medication can cause a severe allergic reaction. Such reactions from a vaccine are very rare, estimated at about 1 in a million doses, and would happen within a few minutes to a few hours after the vaccination. As with any medicine, there is a very remote chance of a vaccine causing a serious injury or death. The safety of vaccines is always being monitored. For more information, visit: www.cdc.gov/vaccinesafety/. What if there is a serious reaction? What should I look for? · Look for anything that concerns you, such as signs of a severe allergic reaction, very high fever, or unusual behavior. Signs of a severe allergic reaction can include hives, swelling of the face and throat, difficulty breathing, a fast heartbeat, dizziness, and weakness  usually within a few minutes to a few hours after the vaccination. What should I do? · If you think it is a severe allergic reaction or other emergency that can't wait, call 9-1-1 and get the person to the nearest hospital. Otherwise, call your doctor. · Reactions should be reported to the \"Vaccine Adverse Event Reporting System\" (VAERS). Your doctor should file this report, or you can do it yourself through the VAERS website at www.vaers. hhs.gov, or by calling 3-284.919.9838. VAERS does not give medical advice. The National Vaccine Injury Compensation Program 
The National Vaccine Injury Compensation Program (VICP) is a federal program that was created to compensate people who may have been injured by certain vaccines. Persons who believe they may have been injured by a vaccine can learn about the program and about filing a claim by calling 5-758.154.1137 or visiting the [employer] website at www.[facility].gov/vaccinecompensation. There is a time limit to file a claim for compensation. How can I learn more? · Ask your healthcare provider. He or she can give you the vaccine package insert or suggest other sources of information. · Call your local or state health department. · Contact the Centers for Disease Control and Prevention (CDC): 
¨ Call 5-868.433.2935 (1-800-CDC-INFO) or ¨ Visit CDC's website at www.cdc.gov/flu Vaccine Information Statement Inactivated Influenza Vaccine 8/7/2015) 42 ARLEEN Smith Estimable 782CA-73 [facility] [place] Centers for Disease Control and Prevention Many Vaccine Information Statements are available in Urdu and other languages. See www.immunize.org/vis. Muchas hojas de información sobre vacunas están disponibles en español y en otros idiomas. Visite www.immunize.org/vis. Care instructions adapted under license by [employer] (which disclaims liability or warranty for this information). If you have questions about a medical condition or this instruction, always ask your healthcare professional. [address] any warranty or liability for your use of this information. Introducing [address]! Dear Nicol Kumari: Thank you for requesting a [employer] account. Our records indicate that you have previously registered for a [employer] account but its currently inactive. Please call our [employer] support line at [phone]. Additional Information If you have questions, please visit the Frequently Asked Questions section of the [employer] website at [email or web address] Remember, [employer] is NOT to be used for urgent needs. For medical emergencies, dial 911. Now available from your iPhone and Android! Please provide this summary of care documentation to your next provider. Your primary care clinician is listed as Gemma Rowe. If you have any questions after today's visit, please call [phone].

## 2017-09-14 NOTE — PATIENT INSTRUCTIONS

## 2017-09-15 NOTE — PROGRESS NOTES
Progress Note    Patient: Declan Rios MRN: [id number]  SSN: [id number]    YOB: 1934  Age: 80 y.o. Sex: female        Chief Complaint   Patient presents with    Immunization/Injection         Subjective:       Here for immunizations. Not currently ill. Denies previous rxns. Current and past medical information:    Current Medications after this visit[de-identified]   Current Outpatient Prescriptions   Medication Sig    FLAXSEED OIL (OMEGA 3 PO) Take  by mouth.  TURMERIC ROOT EXTRACT PO Take  by mouth.  garlic 1,131 mg cap Take  by mouth.  co-enzyme Q-10 (CO Q-10) 100 mg capsule Take 100 mg by mouth daily.  ASCORBIC ACID (C-500 PO) Take  by mouth.  melatonin 10 mg tab Take  by mouth.  potassium 99 mg tablet Take 99 mg by mouth daily.  VITS A,C,E/LUTEIN/MINERALS (HEALTHY EYES PO) Take  by mouth.  MILK THISTLE PO Take  by mouth.  cyanocobalamin (B-12 DOTS) 500 mcg tablet Take 500 mcg by mouth daily.  RESVERATROL PO Take  by mouth.  cinnamon bark (CINNAMON) 500 mg cap Take  by mouth.  OTHER     aspirin 81 mg chewable tablet Take 81 mg by mouth daily.  OTHER     red yeast rice extract 600 mg cap Take 600 mg by mouth now.  cholecalciferol, VITAMIN D3, (VITAMIN D3) 5,000 unit tab tablet Take  by mouth daily.  BOSWELLIA DEB EXTRACT (BOSWELLIA DEB XT, BULK,) by Does Not Apply route.  BILBERRY PO Take  by mouth.  valerian root 450 mg cap Take  by mouth.  LACTASE (LACTAID PO) Take  by mouth.  GYMNEMA LEAF, BULK, by Does Not Apply route.  CL ROOT, BULK, by Does Not Apply route.  TURMERIC (CURCUMIN) Take  by mouth daily.  lisinopril (PRINIVIL, ZESTRIL) 20 mg tablet TAKE ONE TABLET BY MOUTH ONCE DAILY FOR  HYPERTENSION    LACTOBACILLUS COMBINATION NO.8 (ADULT PROBIOTIC PO) Take  by mouth.  OTHER Ultimate Colon Care Formula      No current facility-administered medications for this visit.         Patient Active Problem List Diagnosis Date Noted    Actinic keratoses 08/04/2016    Obesity, Class II, BMI 35-39.9 02/18/2016    BMI 35.0-35.9,adult 02/18/2016    Sensorineural hearing loss of both ears 10/03/2014    Meningioma ([address] 02/08/2013    Tubular adenoma 10/02/2012    Melanosis coli 10/02/2012    Nephrolithiasis 09/27/2012    Cholelithiasis 09/27/2012    Allergic rhinitis 11/15/2010    HTN (hypertension) 11/15/2010       Past Medical History:   Diagnosis Date    Hypertension     IBS (irritable bowel syndrome)     Tubular adenoma 10/2/2012       No Known Allergies    Past Surgical History:   Procedure Laterality Date    ABDOMEN SURGERY PROC UNLISTED         Social History     Social History    Marital status:      Spouse name: N/A    Number of children: N/A    Years of education: N/A     Social History Main Topics    Smoking status: Never Smoker    Smokeless tobacco: Never Used    Alcohol use No    Drug use: No    Sexual activity: Not on file     Other Topics Concern    Not on file     Social History Narrative       Review of Systems   Constitutional: Negative for chills and fever. Objective: There were no vitals filed for this visit. There is no height or weight on file to calculate BMI. Gen: NAD        Assessment and Plan:       ICD-10-CM ICD-9-CM    1. Encounter for immunization Z23 V03.89 INFLUENZA VIRUS VACCINE, HIGH DOSE SEASONAL, PRESERVATIVE FREE      ADMIN INFLUENZA VIRUS VAC         Plan of care:  Discussed diagnoses in detail with patient. Medication risks/benefits/side effects discussed with patient. All of the patient's questions were addressed. The patient understands and agrees with our plan of care. The patient knows to call back if they are unsure of or forget any changes we discussed today or if the symptoms change.      The patient received an After-Visit Summary which contains VS, orders, medication list and allergy list. This can be used as a \"mini-medical record\" should they have to seek medical care while out of town.     Patient Care Team:  Holly Bolaños MD as PCP - General (Family Practice)  Rufina Levi MD (Neurosurgery)    Follow-up Disposition: Not on File    Future Appointments  Date Time Provider Kenyatta Silvia   10/26/2017 8:50 AM Holly Bolaños MD [facility]       Signed By: Salas Espinoza MD     September 14, 2017

## 2017-10-09 DIAGNOSIS — I10 ESSENTIAL HYPERTENSION: ICD-10-CM

## 2017-10-09 RX ORDER — LISINOPRIL 20 MG/1
TABLET ORAL
Qty: 30 TAB | Refills: 0 | Status: SHIPPED | OUTPATIENT
Start: 2017-10-09 | End: 2017-10-26 | Stop reason: SDUPTHER

## 2017-10-26 ENCOUNTER — OFFICE VISIT (OUTPATIENT)
Dept: FAMILY MEDICINE CLINIC | Age: 82
End: 2017-10-26

## 2017-10-26 VITALS
BODY MASS INDEX: 34.81 KG/M2 | HEART RATE: 74 BPM | DIASTOLIC BLOOD PRESSURE: 52 MMHG | OXYGEN SATURATION: 98 % | SYSTOLIC BLOOD PRESSURE: 132 MMHG | RESPIRATION RATE: 20 BRPM | WEIGHT: 184.4 LBS | TEMPERATURE: 98.1 F | HEIGHT: 61 IN

## 2017-10-26 DIAGNOSIS — I10 ESSENTIAL HYPERTENSION: Primary | ICD-10-CM

## 2017-10-26 DIAGNOSIS — Z85.9 HISTORY OF SQUAMOUS CELL CARCINOMA EXCISION: ICD-10-CM

## 2017-10-26 DIAGNOSIS — K80.20 CALCULUS OF GALLBLADDER WITHOUT CHOLECYSTITIS WITHOUT OBSTRUCTION: ICD-10-CM

## 2017-10-26 DIAGNOSIS — Z98.890 HISTORY OF SQUAMOUS CELL CARCINOMA EXCISION: ICD-10-CM

## 2017-10-26 RX ORDER — LISINOPRIL 20 MG/1
TABLET ORAL
Qty: 30 TAB | Refills: 5 | Status: SHIPPED | OUTPATIENT
Start: 2017-10-26 | End: 2018-05-08 | Stop reason: SDUPTHER

## 2017-10-26 NOTE — PROGRESS NOTES
Tyra Mcdermott  80 y.o. female  1934  [facility]  [id number]     [id number]: Progress Note       Encounter Date: 10/26/2017    Chief Complaint   Patient presents with    Hypertension    Labs     History of Present Illness   Tyra Mcdermott is a 80 y.o. female who presents to clinic today for:    Hypertension: Stable   BP Readings from Last 3 Encounters:   10/26/17 132/52   07/19/17 119/52   05/16/17 136/76     The patient reports:  taking medications as instructed, no medication side effects noted, no TIA's, no chest pain on exertion, no dyspnea on exertion, no swelling of ankles. Lab Results   Component Value Date/Time    Sodium 143 04/25/2017 03:42 PM    Potassium 4.5 04/25/2017 03:42 PM    Chloride 103 04/25/2017 03:42 PM    CO2 24 04/25/2017 03:42 PM    Anion gap 11 04/22/2012 07:41 PM    Glucose 73 04/25/2017 03:42 PM    BUN 13 04/25/2017 03:42 PM    Creatinine 0.84 04/25/2017 03:42 PM    BUN/Creatinine ratio 15 04/25/2017 03:42 PM    GFR est AA 74 04/25/2017 03:42 PM    GFR est non-AA 64 04/25/2017 03:42 PM    Calcium 10.1 04/25/2017 03:42 PM    Bilirubin, total 0.4 10/05/2016 01:43 PM    AST (SGOT) 19 10/05/2016 01:43 PM    Alk. phosphatase 65 10/05/2016 01:43 PM    Protein, total 6.8 10/05/2016 01:43 PM    Albumin 4.1 10/05/2016 01:43 PM    Globulin 3.2 12/30/2009 08:39 AM    A-G Ratio 1.5 10/05/2016 01:43 PM    ALT (SGPT) 12 10/05/2016 01:43 PM     Our goal is to normalize the blood pressure to decrease the risks of strokes and heart attacks. The patient is in agreement with the plan. Gall stones  Patient reports that she was told by a doctor in [address] [place] [address] that she has a gallstone. She does not recall the doctor's name or where the office was. However she has been attributing her excess burping to gall stones. Cyst on kidneys  Patient reports that she went to a doctor in [place] 1 year ago and was diagnosed with cysts on kidneys.  She does not recall name of doctor or office. She states that the doctor did not recommend any interventions at that time. Believes it may have been the ER. Health Maintenance  Flu vaccine on 9/15. Health Maintenance Due   Topic Date Due    OSTEOPOROSIS SCREENING (DEXA)  01/06/1999    Pneumococcal 65+ Low/Medium Risk (2 of 2 - PPSV23) 07/22/2017    INFLUENZA AGE 9 TO ADULT  08/01/2017     Review of Systems   Review of Systems   Constitutional: Negative for chills, fever and weight loss. Eyes: Negative for blurred vision and double vision. Respiratory: Negative for cough, shortness of breath and wheezing. Cardiovascular: Negative for chest pain and palpitations. Genitourinary: Negative for dysuria, frequency and urgency. Skin: Negative for itching and rash. Neurological: Negative for dizziness and headaches. Vitals/Objective:     Vitals:    10/26/17 0840   BP: 132/52   Pulse: 74   Resp: 20   Temp: 98.1 °F (36.7 °C)   TempSrc: Oral   SpO2: 98%   Weight: 184 lb 6.4 oz (83.6 kg)   Height: 5' 1\" (1.549 m)     Body mass index is 34.84 kg/(m^2). Physical Exam   Constitutional: She is oriented to person, place, and time. She appears well-developed and well-nourished. Cardiovascular: Normal rate and regular rhythm. Pulmonary/Chest: Effort normal and breath sounds normal.   Musculoskeletal: She exhibits no edema or tenderness. Neurological: She is alert and oriented to person, place, and time. Skin: Skin is warm and dry. No rash noted. No erythema. No results found for this or any previous visit (from the past 24 hour(s)). Assessment and Plan:     Encounter Diagnoses     ICD-10-CM ICD-9-CM   1. Essential hypertension I10 401.9   2. Calculus of gallbladder without cholecystitis without obstruction K80.20 574.20   3. History of squamous cell carcinoma excision Z98.890 V15.29    Z85.9        1. Essential hypertension  Well controlled.    - METABOLIC PANEL, COMPREHENSIVE  - LIPID PANEL  - lisinopril (PRINIVIL, ZESTRIL) 20 mg tablet; TAKE ONE TABLET BY MOUTH ONCE DAILY FOR  HYPERTENSION  Dispense: 30 Tab; Refill: 5    2. Calculus of gallbladder without cholecystitis without obstruction  Advised patient the burping is not an indication on its own for surgical removal of gall bladder to treat stones. 3. History of squamous cell carcinoma excision  Patient had hx of complete excision of SCC. She feels that the area lately has been \"getting harder\" will send a referral to dermatology for evaluation and further procedures as needed. - REFERRAL TO DERMATOLOGY    I have discussed the diagnosis with the patient and the intended plan as seen in the above orders. she has expressed understanding. The patient has received an after-visit summary and questions were answered concerning future plans. I have discussed medication side effects and warnings with the patient as well. Electronically Signed: Mare Osuna MD     History/Allergies   Patients past medical, surgical and family histories were reviewed and updated. Past Medical History:   Diagnosis Date    Hypertension     IBS (irritable bowel syndrome)     Tubular adenoma 10/2/2012      Past Surgical History:   Procedure Laterality Date    ABDOMEN SURGERY PROC UNLISTED       Family History   Problem Relation Age of Onset    Cancer Mother [de-identified]     breast cancer    Alcohol abuse Father      Social History     Social History    Marital status:      Spouse name: N/A    Number of children: N/A    Years of education: N/A     Occupational History    Not on file. Social History Main Topics    Smoking status: Never Smoker    Smokeless tobacco: Never Used    Alcohol use No    Drug use: No    Sexual activity: Not on file     Other Topics Concern    Not on file     Social History Narrative         No Known Allergies    Disposition     Follow-up Disposition:  Return in about 6 months (around 4/26/2018) for Routine. HTN.     No future appointments. Current Medications after this visit     Current Outpatient Prescriptions   Medication Sig    lisinopril (PRINIVIL, ZESTRIL) 20 mg tablet TAKE ONE TABLET BY MOUTH ONCE DAILY FOR  HYPERTENSION    FLAXSEED OIL (OMEGA 3 PO) Take  by mouth.  TURMERIC ROOT EXTRACT PO Take  by mouth.  garlic 0,933 mg cap Take  by mouth.  co-enzyme Q-10 (CO Q-10) 100 mg capsule Take 100 mg by mouth daily.  ASCORBIC ACID (C-500 PO) Take  by mouth.  melatonin 10 mg tab Take  by mouth.  potassium 99 mg tablet Take 99 mg by mouth daily.  VITS A,C,E/LUTEIN/MINERALS (HEALTHY EYES PO) Take  by mouth.  MILK THISTLE PO Take  by mouth.  cyanocobalamin (B-12 DOTS) 500 mcg tablet Take 500 mcg by mouth daily.  RESVERATROL PO Take  by mouth.  cinnamon bark (CINNAMON) 500 mg cap Take  by mouth.  OTHER     aspirin 81 mg chewable tablet Take 81 mg by mouth daily.  OTHER     red yeast rice extract 600 mg cap Take 600 mg by mouth now.  cholecalciferol, VITAMIN D3, (VITAMIN D3) 5,000 unit tab tablet Take  by mouth daily.  BOSWELLIA DEB EXTRACT (BOSWELLIA DEB XT, BULK,) by Does Not Apply route.  BILBERRY PO Take  by mouth.  valerian root 450 mg cap Take  by mouth.  LACTASE (LACTAID PO) Take  by mouth.  GYMNEMA LEAF, BULK, by Does Not Apply route.  CL ROOT, BULK, by Does Not Apply route.  TURMERIC (CURCUMIN) Take  by mouth daily.  LACTOBACILLUS COMBINATION NO.8 (ADULT PROBIOTIC PO) Take  by mouth.  OTHER Ultimate Colon Care Formula      No current facility-administered medications for this visit.       Medications Discontinued During This Encounter   Medication Reason    lisinopril (PRINIVIL, ZESTRIL) 20 mg tablet Reorder

## 2017-10-26 NOTE — MR AVS SNAPSHOT
Visit Information Date & Time Provider Department Dept. Phone Encounter #  
 10/26/2017  8:50 AM Beverly Burleson MD [address] [place] [address] [id number] Follow-up Instructions Return in about 6 months (around 4/26/2018) for Routine. HTN. Upcoming Health Maintenance Date Due OSTEOPOROSIS SCREENING (DEXA) 1/6/1999 Pneumococcal 65+ Low/Medium Risk (2 of 2 - PPSV23) 7/22/2017 INFLUENZA AGE 9 TO ADULT 8/1/2017 GLAUCOMA SCREENING Q2Y 4/5/2018 MEDICARE YEARLY EXAM 7/20/2018 DTaP/Tdap/Td series (2 - Td) 4/22/2022 COLONOSCOPY 8/10/2027 Allergies as of 10/26/2017  Review Complete On: 10/26/2017 By: Beverly Burleson MD  
 No Known Allergies Current Immunizations  Reviewed on 10/27/2015 Name Date Influenza High Dose Vaccine PF  Incomplete Influenza Vaccine 10/27/2015, 11/18/2014, 11/12/2013 Influenza Vaccine (Quad) PF 10/4/2016 Influenza Vaccine Split 10/6/2011, 10/29/2010 Pneumococcal Conjugate (PCV-13) 7/22/2016 TDAP Vaccine 4/22/2012  7:52 PM  
 Zoster Vaccine, Live 10/4/2016 Not reviewed this visit You Were Diagnosed With   
  
 Codes Comments Essential hypertension    -  Primary ICD-10-CM: I10 
ICD-9-CM: 401.9 Calculus of gallbladder without cholecystitis without obstruction     ICD-10-CM: K80.20 ICD-9-CM: 574.20 Vitals BP Pulse Temp Resp Height(growth percentile) Weight(growth percentile) 132/52 74 98.1 °F (36.7 °C) (Oral) 20 5' 1\" (1.549 m) 184 lb 6.4 oz (83.6 kg) LMP SpO2 BMI OB Status Smoking Status 01/01/1986 98% 34.84 kg/m2 Postmenopausal Never Smoker Vitals History BMI and BSA Data Body Mass Index Body Surface Area 34.84 kg/m 2 1.9 m 2 Preferred Pharmacy Pharmacy Name Phone [facility] [address] [phone] Your Updated Medication List  
  
   
 This list is accurate as of: 10/26/17  8:59 AM.  Always use your most recent med list.  
  
  
  
  
 ADULT PROBIOTIC PO Take  by mouth. aspirin 81 mg chewable tablet Take 81 mg by mouth daily. B-12 DOTS 500 mcg tablet Generic drug:  cyanocobalamin Take 500 mcg by mouth daily. BILBERRY PO Take  by mouth. BOSWELLIA DEB XT (BULK)  
by Does Not Apply route. C-500 PO Take  by mouth. cholecalciferol (VITAMIN D3) 5,000 unit Tab tablet Commonly known as:  VITAMIN D3 Take  by mouth daily. CINNAMON 500 mg Cap Generic drug:  cinnamon bark Take  by mouth. CO Q-10 100 mg capsule Generic drug:  co-enzyme Q-10 Take 100 mg by mouth daily. CURCUMIN Take  by mouth daily. garlic 5,274 mg Cap Take  by mouth. CL ROOT (BULK)  
by Does Not Apply route. GYMNEMA LEAF (BULK)  
by Does Not Apply route. HEALTHY EYES PO Take  by mouth. LACTAID PO Take  by mouth.  
  
 lisinopril 20 mg tablet Commonly known as:  PRINIVIL, ZESTRIL  
TAKE ONE TABLET BY MOUTH ONCE DAILY FOR  HYPERTENSION  
  
 melatonin 10 mg Tab Take  by mouth. MILK THISTLE PO Take  by mouth. OMEGA 3 PO Take  by mouth. * OTHER Ultimate Colon Care Formula * OTHER  
  
 * OTHER  
  
 potassium 99 mg tablet Take 99 mg by mouth daily. red yeast rice extract 600 mg Cap Take 600 mg by mouth now. RESVERATROL PO Take  by mouth. TURMERIC ROOT EXTRACT PO Take  by mouth.  
  
 valerian root 450 mg Cap Take  by mouth. * Notice: This list has 3 medication(s) that are the same as other medications prescribed for you. Read the directions carefully, and ask your doctor or other care provider to review them with you. Prescriptions Sent to Pharmacy Refills  
 lisinopril (PRINIVIL, ZESTRIL) 20 mg tablet 5 Sig: TAKE ONE TABLET BY MOUTH ONCE DAILY FOR  HYPERTENSION  Class: Normal  
 Pharmacy: [address] #: [phone] We Performed the Following LIPID PANEL [82376 CPT(R)] METABOLIC PANEL, COMPREHENSIVE [20375 CPT(R)] Follow-up Instructions Return in about 6 months (around 4/26/2018) for Routine. HTN. Introducing [facility]! Dear Mildred Persons: Thank you for requesting a [employer] account. Our records indicate that you have previously registered for a [employer] account but its currently inactive. Please call our [employer] support line at [phone]. Additional Information If you have questions, please visit the Frequently Asked Questions section of the [employer] website at [email or web address] Remember, [employer] is NOT to be used for urgent needs. For medical emergencies, dial 911. Now available from your iPhone and Android! Please provide this summary of care documentation to your next provider. Your primary care clinician is listed as Domenico Brochure. If you have any questions after today's visit, please call [phone].

## 2017-10-26 NOTE — PROGRESS NOTES
Health Maintenance Due   Topic Date Due    OSTEOPOROSIS SCREENING (DEXA)  01/06/1999    Pneumococcal 65+ Low/Medium Risk (2 of 2 - PPSV23) 07/22/2017    INFLUENZA AGE 9 TO ADULT  08/01/2017

## 2017-10-27 LAB
ALBUMIN SERPL-MCNC: 4.2 G/DL (ref 3.5–4.7)
ALBUMIN/GLOB SERPL: 1.4 {RATIO} (ref 1.2–2.2)
ALP SERPL-CCNC: 66 IU/L (ref 39–117)
ALT SERPL-CCNC: 12 IU/L (ref 0–32)
AST SERPL-CCNC: 19 IU/L (ref 0–40)
BILIRUB SERPL-MCNC: 0.3 MG/DL (ref 0–1.2)
BUN SERPL-MCNC: 12 MG/DL (ref 8–27)
BUN/CREAT SERPL: 12 (ref 12–28)
CALCIUM SERPL-MCNC: 10.4 MG/DL (ref 8.7–10.3)
CHLORIDE SERPL-SCNC: 104 MMOL/L (ref 96–106)
CHOLEST SERPL-MCNC: 187 MG/DL (ref 100–199)
CO2 SERPL-SCNC: 20 MMOL/L (ref 18–29)
CREAT SERPL-MCNC: 1 MG/DL (ref 0.57–1)
GFR SERPLBLD CREATININE-BSD FMLA CKD-EPI: 52 ML/MIN/1.73
GFR SERPLBLD CREATININE-BSD FMLA CKD-EPI: 60 ML/MIN/1.73
GLOBULIN SER CALC-MCNC: 3 G/DL (ref 1.5–4.5)
GLUCOSE SERPL-MCNC: 82 MG/DL (ref 65–99)
HDLC SERPL-MCNC: 39 MG/DL
LDLC SERPL CALC-MCNC: 117 MG/DL (ref 0–99)
POTASSIUM SERPL-SCNC: 4.8 MMOL/L (ref 3.5–5.2)
PROT SERPL-MCNC: 7.2 G/DL (ref 6–8.5)
SODIUM SERPL-SCNC: 144 MMOL/L (ref 134–144)
TRIGL SERPL-MCNC: 156 MG/DL (ref 0–149)
VLDLC SERPL CALC-MCNC: 31 MG/DL (ref 5–40)

## 2017-10-30 PROBLEM — N28.1 PARAPELVIC RENAL CYST: Status: ACTIVE | Noted: 2017-10-30

## 2018-05-04 ENCOUNTER — OFFICE VISIT (OUTPATIENT)
Dept: FAMILY MEDICINE CLINIC | Age: 83
End: 2018-05-04

## 2018-05-04 VITALS
HEART RATE: 103 BPM | RESPIRATION RATE: 20 BRPM | SYSTOLIC BLOOD PRESSURE: 127 MMHG | WEIGHT: 182 LBS | DIASTOLIC BLOOD PRESSURE: 62 MMHG | TEMPERATURE: 97.3 F | BODY MASS INDEX: 34.36 KG/M2 | HEIGHT: 61 IN | OXYGEN SATURATION: 93 %

## 2018-05-04 DIAGNOSIS — J30.89 ENVIRONMENTAL AND SEASONAL ALLERGIES: ICD-10-CM

## 2018-05-04 DIAGNOSIS — H61.23 BILATERAL IMPACTED CERUMEN: Primary | ICD-10-CM

## 2018-05-04 RX ORDER — LORATADINE 10 MG/1
10 TABLET ORAL DAILY
Qty: 90 TAB | Refills: 4 | Status: SHIPPED | OUTPATIENT
Start: 2018-05-04 | End: 2019-06-19

## 2018-05-04 RX ORDER — FLUTICASONE PROPIONATE 50 MCG
2 SPRAY, SUSPENSION (ML) NASAL DAILY
Qty: 1 BOTTLE | Refills: 4 | Status: SHIPPED | OUTPATIENT
Start: 2018-05-04 | End: 2019-07-08

## 2018-05-04 NOTE — PATIENT INSTRUCTIONS
Allergies: Care Instructions  Your Care Instructions    Allergies occur when your body's defense system (immune system) overreacts to certain substances. The immune system treats a harmless substance as if it were a harmful germ or virus. Many things can cause this overreaction, including pollens, medicine, food, dust, animal dander, and mold. Allergies can be mild or severe. Mild allergies can be managed with home treatment. But medicine may be needed to prevent problems. Managing your allergies is an important part of staying healthy. Your doctor may suggest that you have allergy testing to help find out what is causing your allergies. When you know what things trigger your symptoms, you can avoid them. This can prevent allergy symptoms and other health problems. For severe allergies that cause reactions that affect your whole body (anaphylactic reactions), your doctor may prescribe a shot of epinephrine to carry with you in case you have a severe reaction. Learn how to give yourself the shot and keep it with you at all times. Make sure it is not . Follow-up care is a key part of your treatment and safety. Be sure to make and go to all appointments, and call your doctor if you are having problems. It's also a good idea to know your test results and keep a list of the medicines you take. How can you care for yourself at home? · If you have been told by your doctor that dust or dust mites are causing your allergy, decrease the dust around your bed:  [facility] [employer] sheets, pillowcases, and other bedding in hot water every week. ¨ Use dust-proof covers for pillows, duvets, and mattresses. Avoid plastic covers because they tear easily and do not \"breathe. \" Wash as instructed on the label. ¨ Do not use any blankets and pillows that you do not need. ¨ Use blankets that you can wash in your washing machine. ¨ Consider removing drapes and carpets, which attract and hold dust, from your bedroom.   · If you are allergic to house dust and mites, do not use home humidifiers. Your doctor can suggest ways you can control dust and mites. · Look for signs of cockroaches. Cockroaches cause allergic reactions. Use cockroach baits to get rid of them. Then, clean your home well. Cockroaches like areas where grocery bags, newspapers, empty bottles, or cardboard boxes are stored. Do not keep these inside your home, and keep trash and food containers sealed. Seal off any spots where cockroaches might enter your home. · If you are allergic to mold, get rid of furniture, rugs, and drapes that smell musty. Check for mold in the bathroom. · If you are allergic to outdoor pollen or mold spores, use air-conditioning. Change or clean all filters every month. Keep windows closed. · If you are allergic to pollen, stay inside when pollen counts are high. Use a vacuum  with a HEPA filter or a double-thickness filter at least two times each week. · Stay inside when air pollution is bad. Avoid paint fumes, perfumes, and other strong odors. · Avoid conditions that make your allergies worse. Stay away from smoke. Do not smoke or let anyone else smoke in your house. Do not use fireplaces or wood-burning stoves. · If you are allergic to your pets, change the air filter in your furnace every month. Use high-efficiency filters. · If you are allergic to pet dander, keep pets outside or out of your bedroom. Old carpet and cloth furniture can hold a lot of animal dander. You may need to replace them. When should you call for help? Give an epinephrine shot if:  ? · You think you are having a severe allergic reaction. ? · You have symptoms in more than one body area, such as mild nausea and an itchy mouth. ? After giving an epinephrine shot call 911, even if you feel better. ?Call 911 if:  ? · You have symptoms of a severe allergic reaction. These may include:  ¨ Sudden raised, red areas (hives) all over your body.   ¨ Swelling of the throat, mouth, lips, or tongue. ¨ Trouble breathing. ¨ Passing out (losing consciousness). Or you may feel very lightheaded or suddenly feel weak, confused, or restless. ? · You have been given an epinephrine shot, even if you feel better. ?Call your doctor now or seek immediate medical care if:  ? · You have symptoms of an allergic reaction, such as:  ¨ A rash or hives (raised, red areas on the skin). ¨ Itching. ¨ Swelling. ¨ Belly pain, nausea, or vomiting. ? Watch closely for changes in your health, and be sure to contact your doctor if:  ? · You do not get better as expected. Where can you learn more? Go to http://pily-brandon.info/. Enter T036 in the search box to learn more about \"Allergies: Care Instructions. \"  Current as of: September 29, 2016  Content Version: 11.4  © 8633-0426 [employer]. Care instructions adapted under license by [employer] (which disclaims liability or warranty for this information). If you have questions about a medical condition or this instruction, always ask your healthcare professional. [place] [address] any warranty or liability for your use of this information.

## 2018-05-04 NOTE — MR AVS SNAPSHOT
Genevieve Man 
 
 
 [address] [place] [address] 
[phone] Patient: Evita Pfeiffer MRN: [id number] :1934 Visit Information Date & Time Provider Department Dept. Phone Encounter #  
 2018  8:00 AM Aurelia Johnson MD [address] [place] [address] [phone] [id number] Upcoming Health Maintenance Date Due Bone Densitometry (Dexa) Screening 1999 Pneumococcal 65+ Low/Medium Risk (2 of 2 - PPSV23) 2017 GLAUCOMA SCREENING Q2Y 2018 MEDICARE YEARLY EXAM 2018 Influenza Age 5 to Adult 2018 DTaP/Tdap/Td series (2 - Td) 2022 COLONOSCOPY 8/10/2027 Allergies as of 2018  Review Complete On: 10/26/2017 By: Anabelle Grider MD  
 No Known Allergies Current Immunizations  Reviewed on 10/27/2015 Name Date Influenza High Dose Vaccine PF 10/26/2017 Influenza Vaccine 10/27/2015, 2014, 2013 Influenza Vaccine (Quad) PF 10/4/2016 Influenza Vaccine Split 10/6/2011, 10/29/2010 Pneumococcal Conjugate (PCV-13) 2016 TDAP Vaccine 2012  7:52 PM  
 Zoster Vaccine, Live 10/4/2016 Not reviewed this visit Vitals BP Pulse Temp Resp Height(growth percentile) Weight(growth percentile) 127/62 (BP 1 Location: Left arm, BP Patient Position: Sitting) (!) 103 97.3 °F (36.3 °C) (Oral) 20 5' 1\" (1.549 m) 182 lb (82.6 kg) LMP SpO2 BMI OB Status Smoking Status 1986 93% 34.39 kg/m2 Postmenopausal Never Smoker Vitals History BMI and BSA Data Body Mass Index Body Surface Area  
 34.39 kg/m 2 1.89 m 2 Preferred Pharmacy Pharmacy Name Phone [facility] [address] [phone] Your Updated Medication List  
  
   
This list is accurate as of 18  8:58 AM.  Always use your most recent med list.  
  
  
  
  
 ADULT PROBIOTIC PO Take  by mouth. aspirin 81 mg chewable tablet Take 81 mg by mouth daily. B-12 DOTS 500 mcg tablet Generic drug:  cyanocobalamin Take 500 mcg by mouth daily. BILBERRY PO Take  by mouth. BOSWELLIA DEB XT (BULK)  
by Does Not Apply route. C-500 PO Take  by mouth. cholecalciferol (VITAMIN D3) 5,000 unit Tab tablet Commonly known as:  VITAMIN D3 Take  by mouth daily. CINNAMON 500 mg Cap Generic drug:  cinnamon bark Take  by mouth. CO Q-10 100 mg capsule Generic drug:  co-enzyme Q-10 Take 100 mg by mouth daily. CURCUMIN Take  by mouth daily. garlic 6,637 mg Cap Take  by mouth. CL ROOT (BULK)  
by Does Not Apply route. GYMNEMA LEAF (BULK)  
by Does Not Apply route. HEALTHY EYES PO Take  by mouth. LACTAID PO Take  by mouth.  
  
 lisinopril 20 mg tablet Commonly known as:  PRINIVIL, ZESTRIL  
TAKE ONE TABLET BY MOUTH ONCE DAILY FOR  HYPERTENSION  
  
 melatonin 10 mg Tab Take  by mouth. MILK THISTLE PO Take  by mouth. OMEGA 3 PO Take  by mouth. OTHER Ultimate Colon Care Formula * OTHER  
  
 * OTHER  
  
 potassium 99 mg tablet Take 99 mg by mouth daily. red yeast rice extract 600 mg Cap Take 600 mg by mouth now. RESVERATROL PO Take  by mouth. TURMERIC ROOT EXTRACT PO Take  by mouth.  
  
 valerian root 450 mg Cap Take  by mouth. * Notice: This list has 2 medication(s) that are the same as other medications prescribed for you. Read the directions carefully, and ask your doctor or other care provider to review them with you. Patient Instructions Allergies: Care Instructions Your Care Instructions Allergies occur when your body's defense system (immune system) overreacts to certain substances. The immune system treats a harmless substance as if it were a harmful germ or virus.  Many things can cause this overreaction, including pollens, medicine, food, dust, animal dander, and mold. Allergies can be mild or severe. Mild allergies can be managed with home treatment. But medicine may be needed to prevent problems. Managing your allergies is an important part of staying healthy. Your doctor may suggest that you have allergy testing to help find out what is causing your allergies. When you know what things trigger your symptoms, you can avoid them. This can prevent allergy symptoms and other health problems. For severe allergies that cause reactions that affect your whole body (anaphylactic reactions), your doctor may prescribe a shot of epinephrine to carry with you in case you have a severe reaction. Learn how to give yourself the shot and keep it with you at all times. Make sure it is not . Follow-up care is a key part of your treatment and safety. Be sure to make and go to all appointments, and call your doctor if you are having problems. It's also a good idea to know your test results and keep a list of the medicines you take. How can you care for yourself at home? · If you have been told by your doctor that dust or dust mites are causing your allergy, decrease the dust around your bed: 
[facility] sheets, pillowcases, and other bedding in hot water every week. ¨ Use dust-proof covers for pillows, duvets, and mattresses. Avoid plastic covers because they tear easily and do not \"breathe. \" Wash as instructed on the label. ¨ Do not use any blankets and pillows that you do not need. ¨ Use blankets that you can wash in your washing machine. ¨ Consider removing drapes and carpets, which attract and hold dust, from your bedroom. · If you are allergic to house dust and mites, do not use home humidifiers. Your doctor can suggest ways you can control dust and mites. · Look for signs of cockroaches. Cockroaches cause allergic reactions. Use cockroach baits to get rid of them. Then, clean your home well. Cockroaches like areas where grocery bags, newspapers, empty bottles, or cardboard boxes are stored. Do not keep these inside your home, and keep trash and food containers sealed. Seal off any spots where cockroaches might enter your home. · If you are allergic to mold, get rid of furniture, rugs, and drapes that smell musty. Check for mold in the bathroom. · If you are allergic to outdoor pollen or mold spores, use air-conditioning. Change or clean all filters every month. Keep windows closed. · If you are allergic to pollen, stay inside when pollen counts are high. Use a vacuum  with a HEPA filter or a double-thickness filter at least two times each week. · Stay inside when air pollution is bad. Avoid paint fumes, perfumes, and other strong odors. · Avoid conditions that make your allergies worse. Stay away from smoke. Do not smoke or let anyone else smoke in your house. Do not use fireplaces or wood-burning stoves. · If you are allergic to your pets, change the air filter in your furnace every month. Use high-efficiency filters. · If you are allergic to pet dander, keep pets outside or out of your bedroom. Old carpet and cloth furniture can hold a lot of animal dander. You may need to replace them. When should you call for help? Give an epinephrine shot if: 
? · You think you are having a severe allergic reaction. ? · You have symptoms in more than one body area, such as mild nausea and an itchy mouth. ? After giving an epinephrine shot call 911, even if you feel better. ?Call 911 if: 
? · You have symptoms of a severe allergic reaction. These may include: 
¨ Sudden raised, red areas (hives) all over your body. ¨ Swelling of the throat, mouth, lips, or tongue. ¨ Trouble breathing. ¨ Passing out (losing consciousness). Or you may feel very lightheaded or suddenly feel weak, confused, or restless. ? · You have been given an epinephrine shot, even if you feel better. ?Call your doctor now or seek immediate medical care if: 
? · You have symptoms of an allergic reaction, such as: ¨ A rash or hives (raised, red areas on the skin). ¨ Itching. ¨ Swelling. ¨ Belly pain, nausea, or vomiting. ? Watch closely for changes in your health, and be sure to contact your doctor if: 
? · You do not get better as expected. Where can you learn more? Go to http://pily-brandon.info/. Enter N036 in the search box to learn more about \"Allergies: Care Instructions. \" Current as of: September 29, 2016 Content Version: 11.4 © 8043-4385 [employer] Care instructions adapted under license by [employer] (which disclaims liability or warranty for this information). If you have questions about a medical condition or this instruction, always ask your healthcare professional. [address] any warranty or liability for your use of this information. Please provide this summary of care documentation to your next provider. Your primary care clinician is listed as Holly Bolaños. If you have any questions after today's visit, please call [phone].

## 2018-05-04 NOTE — PROGRESS NOTES
1. Have you been to the ER, urgent care clinic since your last visit? Hospitalized since your last visit? No    2. Have you seen or consulted any other health care providers outside of the [address] [place] [address] since your last visit? Include any pap smears or colon screening. No  Reviewed record in preparation for visit and have necessary documentation  Pt did not bring medication to office visit for review    Goals that were addressed and/or need to be completed during or after this appointment include   Health Maintenance Due   Topic Date Due    Bone Densitometry (Dexa) Screening  01/06/1999    Pneumococcal 65+ Low/Medium Risk (2 of 2 - PPSV23) 07/22/2017    GLAUCOMA SCREENING Q2Y  04/05/2018     Body mass index is 34.39 kg/(m^2).

## 2018-05-08 DIAGNOSIS — I10 ESSENTIAL HYPERTENSION: ICD-10-CM

## 2018-05-08 RX ORDER — LISINOPRIL 20 MG/1
20 TABLET ORAL DAILY
Qty: 30 TAB | Refills: 0 | Status: SHIPPED | OUTPATIENT
Start: 2018-05-08 | End: 2018-05-30 | Stop reason: SDUPTHER

## 2018-05-08 NOTE — LETTER
5/8/2018 11:27 AM 
 
Ms. Jose R Gomez [address] [address] Dear Ms. Moore Aidan missed you! Please call our office at [phone] and schedule a follow up appointment for your continued care. Please schedule a routine appointment for your chronic conditions. An appointment is required for future refills. Sincerely, KRIS VALDIVIA & NAT OHARA [facility]

## 2018-05-11 NOTE — PROGRESS NOTES
Progress Note    Patient: Ramona Campbell MRN: [id number]  SSN: [id number]    YOB: 1934  Age: 80 y.o. Sex: female        Chief Complaint   Patient presents with    Wax in Ear         Subjective:     Patient has had worsening trouble hearing recently. Thinks her ears might be clogged. She has not tried any drops. Denies pain    She has also had nasal congestion that is bothering her more than usual.  Sniffly nose, moderate. Can still breathe ok at night. Current and past medical information:    Current Medications after this visit[de-identified]     Current Outpatient Prescriptions   Medication Sig    fluticasone (FLONASE) 50 mcg/actuation nasal spray 2 Sprays by Both Nostrils route daily.  sodium chloride (OCEAN) 0.65 % nasal squeeze bottle 0.05 mL by Both Nostrils route as needed for Congestion.  loratadine (CLARITIN) 10 mg tablet Take 1 Tab by mouth daily.  carbamide peroxide (DEBROX) 6.5 % otic solution Administer 5 Drops into each ear two (2) times a day.  TURMERIC ROOT EXTRACT PO Take  by mouth.  garlic 0,813 mg cap Take  by mouth.  melatonin 10 mg tab Take  by mouth.  cyanocobalamin (B-12 DOTS) 500 mcg tablet Take 500 mcg by mouth daily.  cinnamon bark (CINNAMON) 500 mg cap Take  by mouth.  aspirin 81 mg chewable tablet Take 81 mg by mouth daily.  cholecalciferol, VITAMIN D3, (VITAMIN D3) 5,000 unit tab tablet Take  by mouth daily.  BOSWELLIA DEB EXTRACT (BOSWELLIA DEB XT, BULK,) by Does Not Apply route.  GYMNEMA LEAF, BULK, by Does Not Apply route.  TURMERIC (CURCUMIN) Take  by mouth daily.  lisinopril (PRINIVIL, ZESTRIL) 20 mg tablet Take 1 Tab by mouth daily. Needs appt for further refills.  FLAXSEED OIL (OMEGA 3 PO) Take  by mouth.  co-enzyme Q-10 (CO Q-10) 100 mg capsule Take 100 mg by mouth daily.  ASCORBIC ACID (C-500 PO) Take  by mouth.  potassium 99 mg tablet Take 99 mg by mouth daily.     VITS A,C,E/LUTEIN/MINERALS (HEALTHY EYES PO) Take  by mouth.  MILK THISTLE PO Take  by mouth.  RESVERATROL PO Take  by mouth.  OTHER     OTHER     red yeast rice extract 600 mg cap Take 600 mg by mouth now.  BILBERRY PO Take  by mouth.  valerian root 450 mg cap Take  by mouth.  LACTASE (LACTAID PO) Take  by mouth.  CL ROOT, BULK, by Does Not Apply route.  LACTOBACILLUS COMBINATION NO.8 (ADULT PROBIOTIC PO) Take  by mouth.  OTHER Ultimate Colon Care Formula      No current facility-administered medications for this visit. Patient Active Problem List    Diagnosis Date Noted    Parapelvic renal cyst 10/30/2017    Actinic keratoses 08/04/2016    Obesity, Class II, BMI 35-39.9 02/18/2016    BMI 35.0-35.9,adult 02/18/2016    Sensorineural hearing loss of both ears 10/03/2014    Meningioma ([address] 02/08/2013    Tubular adenoma 10/02/2012    Melanosis coli 10/02/2012    Nephrolithiasis 09/27/2012    Cholelithiasis 09/27/2012    Allergic rhinitis 11/15/2010    HTN (hypertension) 11/15/2010       Past Medical History:   Diagnosis Date    Hypertension     IBS (irritable bowel syndrome)     Tubular adenoma 10/2/2012       No Known Allergies    Past Surgical History:   Procedure Laterality Date    ABDOMEN SURGERY PROC UNLISTED         Social History     Social History    Marital status:      Spouse name: N/A    Number of children: N/A    Years of education: N/A     Social History Main Topics    Smoking status: Never Smoker    Smokeless tobacco: Never Used    Alcohol use No    Drug use: No    Sexual activity: Not Asked     Other Topics Concern    None     Social History Narrative       Review of Systems   Constitutional: Negative for chills and fever.         Objective:     Vitals:    05/04/18 0814   BP: 127/62   Pulse: (!) 103   Resp: 20   Temp: 97.3 °F (36.3 °C)   TempSrc: Oral   SpO2: 93%   Weight: 182 lb (82.6 kg)   Height: 5' 1\" (1.549 m)      Body mass index is 34.39 kg/(m^2). Physical Exam   Constitutional: She is oriented to person, place, and time. No distress. HENT:   Head: Normocephalic and atraumatic. Injected turbinates bilaterally, nontender sinuses. Bilateral TMs are impacted prior to and after irrigation and following curette removal, with significant cerumen removed. Eyes: EOM are normal. Pupils are equal, round, and reactive to light. Right eye exhibits no discharge. No scleral icterus. Neck: Normal range of motion. No thyromegaly present. Cardiovascular: Normal rate and regular rhythm. Exam reveals no gallop and no friction rub. No murmur heard. Pulmonary/Chest: Effort normal. No respiratory distress. She has no wheezes. She has no rales. Abdominal: Soft. Bowel sounds are normal. She exhibits no distension. There is no tenderness. Musculoskeletal: She exhibits no edema or tenderness. Lymphadenopathy:     She has no cervical adenopathy. Neurological: She is alert and oriented to person, place, and time. No cranial nerve deficit. Skin: Skin is warm and dry. She is not diaphoretic. Psychiatric: Affect and judgment normal.   Nursing note and vitals reviewed. Assessment and orders:     Diagnoses and all orders for this visit:    1. Bilateral impacted cerumen  -     carbamide peroxide (DEBROX) 6.5 % otic solution; Administer 5 Drops into each ear two (2) times a day.  -follow up for retrial of irrigation and/or curette after a few days of debrox at home    2. Environmental and seasonal allergies  -     fluticasone (FLONASE) 50 mcg/actuation nasal spray; 2 Sprays by Both Nostrils route daily. -     sodium chloride (OCEAN) 0.65 % nasal squeeze bottle; 0.05 mL by Both Nostrils route as needed for Congestion.  -     loratadine (CLARITIN) 10 mg tablet; Take 1 Tab by mouth daily. Plan of care:  Discussed diagnoses in detail with patient. Medication risks/benefits/side effects discussed with patient.      All of the patient's questions were addressed. The patient understands and agrees with our plan of care. The patient knows to call back if they are unsure of or forget any changes we discussed today or if the symptoms change. The patient received an After-Visit Summary which contains VS, orders, medication list and allergy list. This can be used as a \"mini-medical record\" should they have to seek medical care while out of town. Patient Care Team:  Regan Jurado MD as PCP - General (Family Practice)  Tiffany Arthur MD (Neurosurgery)    Follow-up Disposition: Not on File    No future appointments.     Signed By: Chirag Varghese MD     May 4, 2018

## 2018-05-30 ENCOUNTER — OFFICE VISIT (OUTPATIENT)
Dept: FAMILY MEDICINE CLINIC | Age: 83
End: 2018-05-30

## 2018-05-30 VITALS
DIASTOLIC BLOOD PRESSURE: 58 MMHG | WEIGHT: 181.2 LBS | RESPIRATION RATE: 18 BRPM | SYSTOLIC BLOOD PRESSURE: 123 MMHG | HEIGHT: 61 IN | TEMPERATURE: 98.1 F | BODY MASS INDEX: 34.21 KG/M2 | HEART RATE: 90 BPM | OXYGEN SATURATION: 96 %

## 2018-05-30 DIAGNOSIS — H61.23 BILATERAL IMPACTED CERUMEN: ICD-10-CM

## 2018-05-30 DIAGNOSIS — Z23 ENCOUNTER FOR IMMUNIZATION: ICD-10-CM

## 2018-05-30 DIAGNOSIS — H90.3 SENSORINEURAL HEARING LOSS OF BOTH EARS: ICD-10-CM

## 2018-05-30 DIAGNOSIS — I10 ESSENTIAL HYPERTENSION: Primary | ICD-10-CM

## 2018-05-30 RX ORDER — LISINOPRIL 20 MG/1
20 TABLET ORAL DAILY
Qty: 90 TAB | Refills: 1 | Status: SHIPPED | OUTPATIENT
Start: 2018-05-30 | End: 2018-12-06 | Stop reason: SDUPTHER

## 2018-05-30 NOTE — MR AVS SNAPSHOT
Delon Meneses 
 
 
 [address] [facility] [address] [id number] [phone] Patient: Mau Gamboa MRN: [id number] :1934 Visit Information Date & Time Provider Department Dept. Phone Encounter #  
 2018  9:10 AM Dominik Benites  [facility] [phone] [id number] Your Appointments 2018  2:10 PM  
Medicare Physical with Aleksey Green  [facility] ([address]) Appt Note: [address] [address]  
[place] [address] [address] Upcoming Health Maintenance Date Due Pneumococcal 65+ Low/Medium Risk (2 of 2 - PPSV23) 2017 GLAUCOMA SCREENING Q2Y 2018 Bone Densitometry (Dexa) Screening 2019* MEDICARE YEARLY EXAM 2018 Influenza Age 5 to Adult 2018 DTaP/Tdap/Td series (2 - Td) 2022 COLONOSCOPY 8/10/2027 *Topic was postponed. The date shown is not the original due date. Allergies as of 2018  Review Complete On: 2018 By: Dominik Benites MD  
 No Known Allergies Current Immunizations  Reviewed on 10/27/2015 Name Date Influenza High Dose Vaccine PF 10/26/2017 Influenza Vaccine 10/27/2015, 2014, 2013 Influenza Vaccine (Quad) PF 10/4/2016 Influenza Vaccine Split 10/6/2011, 10/29/2010 Pneumococcal Conjugate (PCV-13) 2016 TDAP Vaccine 2012  7:52 PM  
 Zoster Vaccine, Live 10/4/2016 Not reviewed this visit You Were Diagnosed With   
  
 Codes Comments Essential hypertension    -  Primary ICD-10-CM: I10 
ICD-9-CM: 401.9 Bilateral impacted cerumen     ICD-10-CM: H61.23 
ICD-9-CM: 380.4 Encounter for immunization     ICD-10-CM: F30 ICD-9-CM: V03.89 Vitals BP Pulse Temp Resp Height(growth percentile) Weight(growth percentile) 123/58 (BP 1 Location: Right arm, BP Patient Position: Sitting) 90 98.1 °F (36.7 °C) (Oral) 18 5' 1\" (1.549 m) 181 lb 3.2 oz (82.2 kg) LMP SpO2 BMI OB Status Smoking Status 01/01/1986 96% 34.24 kg/m2 Postmenopausal Never Smoker Vitals History BMI and BSA Data Body Mass Index Body Surface Area  
 34.24 kg/m 2 1.88 m 2 Preferred Pharmacy Pharmacy Name Phone [address] [place] [address] [phone] Your Updated Medication List  
  
   
This list is accurate as of 5/30/18  9:10 AM.  Always use your most recent med list.  
  
  
  
  
 aspirin 81 mg chewable tablet Take 81 mg by mouth daily. B-12 DOTS 500 mcg tablet Generic drug:  cyanocobalamin Take 500 mcg by mouth daily. BOSWELLIA DEB XT (BULK)  
by Does Not Apply route. carbamide peroxide 6.5 % otic solution Commonly known as:  Kayleigh Bui Administer 5 Drops into each ear two (2) times a day. cholecalciferol (VITAMIN D3) 5,000 unit Tab tablet Commonly known as:  VITAMIN D3 Take  by mouth daily. CINNAMON 500 mg Cap Generic drug:  cinnamon bark Take  by mouth. CURCUMIN Take  by mouth daily. fluticasone 50 mcg/actuation nasal spray Commonly known as:  Berna Coffee 2 Sprays by Both Nostrils route daily. garlic 2,884 mg Cap Take  by mouth. GYMNEMA LEAF (BULK)  
by Does Not Apply route. lisinopril 20 mg tablet Commonly known as:  Sonali Husbands Take 1 Tab by mouth daily. loratadine 10 mg tablet Commonly known as:  Theone Handing Take 1 Tab by mouth daily. melatonin 10 mg Tab Take  by mouth.  
  
 pneumococcal 23-karin ps vaccine 25 mcg/0.5 mL injection Commonly known as:  PNEUMOVAX 23  
0.5 mL by IntraMUSCular route once for 1 dose. To be administered at pharmacy. Indications: PREVENTION OF STREPTOCOCCUS PNEUMONIAE INFECTION  
  
 sodium chloride 0.65 % nasal squeeze bottle Commonly known as:  OCEAN  
0.05 mL by Both Nostrils route as needed for Congestion. TURMERIC ROOT EXTRACT PO Take  by mouth. Prescriptions Printed Refills  
 pneumococcal 23-karin ps vaccine (PNEUMOVAX 23) 25 mcg/0.5 mL injection 0 Si.5 mL by IntraMUSCular route once for 1 dose. To be administered at pharmacy. Indications: PREVENTION OF STREPTOCOCCUS PNEUMONIAE INFECTION Class: Print Route: IntraMUSCular Prescriptions Sent to Pharmacy Refills  
 lisinopril (PRINIVIL, ZESTRIL) 20 mg tablet 1 Sig: Take 1 Tab by mouth daily. Class: Normal  
 Pharmacy: [facility] [address] SILVANA STEEN [address] [place] [address] #: [phone] Route: Oral  
  
We Performed the Following LIPID PANEL [72786 CPT(R)] METABOLIC PANEL, BASIC [15163 CPT(R)] AZ REMOVAL IMPACTED CERUMEN IRRIGATION/LVG UNILAT [46529 CPT(R)] [place] REMOVAL IMPACTED CERUMEN IRRIGATION/LVG UNILAT [81805 CPT(R)] TSH 3RD GENERATION [88279 CPT(R)] Patient Instructions Earwax Blockage: Care Instructions Your Care Instructions Earwax is a natural substance that protects the ear canal. Normally, earwax drains from the ears and does not cause problems. Sometimes earwax builds up and hardens. Earwax blockage (also called cerumen impaction) can cause some loss of hearing and pain. When wax is tightly packed, you will need to have your doctor remove it. Follow-up care is a key part of your treatment and safety. Be sure to make and go to all appointments, and call your doctor if you are having problems. It's also a good idea to know your test results and keep a list of the medicines you take. How can you care for yourself at home? · Do not try to remove earwax with cotton swabs, fingers, or other objects. This can make the blockage worse and damage the eardrum. · If your doctor recommends that you try to remove earwax at home: ¨ Soften and loosen the earwax with warm mineral oil. You also can try hydrogen peroxide mixed with an equal amount of room temperature water. Place 2 drops of the fluid, warmed to body temperature, in the ear two times a day for up to 5 days. ¨ Once the wax is loose and soft, all that is usually needed to remove it from the ear canal is a gentle, warm shower. Direct the water into the ear, then tip your head to let the earwax drain out. Dry your ear thoroughly with a hair dryer set on low. Hold the dryer several inches from your ear. ¨ If the warm mineral oil and shower do not work, use an over-the-counter wax softener followed by gentle flushing with an ear syringe each night for a week or two. Make sure the flushing solution is body temperature. Cool or hot fluids in the ear can cause dizziness. When should you call for help? Call your doctor now or seek immediate medical care if: 
? · Pus or blood drains from your ear. ? · Your ears are ringing or feel full. ? · You have a loss of hearing. ? Watch closely for changes in your health, and be sure to contact your doctor if: 
? · You have pain or reduced hearing after 1 week of home treatment. ? · You have any new symptoms, such as nausea or balance problems. Where can you learn more? Go to http://pily-brandon.info/. Enter F817 in the search box to learn more about \"Earwax Blockage: Care Instructions. \" Current as of: March 20, 2017 Content Version: 11.4 © 7476-0302 [employer]. Care instructions adapted under license by [employer] (which disclaims liability or warranty for this information). If you have questions about a medical condition or this instruction, always ask your healthcare professional. [place] [address] any warranty or liability for your use of this information. Please provide this summary of care documentation to your next provider. Your primary care clinician is listed as Noreen Whaley. If you have any questions after today's visit, please call [phone].

## 2018-05-30 NOTE — PROGRESS NOTES
Reviewed record in preparation for visit and have necessary documentation  Pt did not bring medication to office visit for review    Goals that were addressed and/or need to be completed during or after this appointment include   Health Maintenance Due   Topic Date Due    Bone Densitometry (Dexa) Screening  01/06/1999    Pneumococcal 65+ Low/Medium Risk (2 of 2 - PPSV23) 07/22/2017    GLAUCOMA SCREENING Q2Y  04/05/2018

## 2018-05-30 NOTE — PATIENT INSTRUCTIONS
Earwax Blockage: Care Instructions  Your Care Instructions    Earwax is a natural substance that protects the ear canal. Normally, earwax drains from the ears and does not cause problems. Sometimes earwax builds up and hardens. Earwax blockage (also called cerumen impaction) can cause some loss of hearing and pain. When wax is tightly packed, you will need to have your doctor remove it. Follow-up care is a key part of your treatment and safety. Be sure to make and go to all appointments, and call your doctor if you are having problems. It's also a good idea to know your test results and keep a list of the medicines you take. How can you care for yourself at home? · Do not try to remove earwax with cotton swabs, fingers, or other objects. This can make the blockage worse and damage the eardrum. · If your doctor recommends that you try to remove earwax at home:  ¨ Soften and loosen the earwax with warm mineral oil. You also can try hydrogen peroxide mixed with an equal amount of room temperature water. Place 2 drops of the fluid, warmed to body temperature, in the ear two times a day for up to 5 days. ¨ Once the wax is loose and soft, all that is usually needed to remove it from the ear canal is a gentle, warm shower. Direct the water into the ear, then tip your head to let the earwax drain out. Dry your ear thoroughly with a hair dryer set on low. Hold the dryer several inches from your ear. ¨ If the warm mineral oil and shower do not work, use an over-the-counter wax softener followed by gentle flushing with an ear syringe each night for a week or two. Make sure the flushing solution is body temperature. Cool or hot fluids in the ear can cause dizziness. When should you call for help? Call your doctor now or seek immediate medical care if:  ? · Pus or blood drains from your ear. ? · Your ears are ringing or feel full. ? · You have a loss of hearing. ? Watch closely for changes in your health, and be sure to contact your doctor if:  ? · You have pain or reduced hearing after 1 week of home treatment. ? · You have any new symptoms, such as nausea or balance problems. Where can you learn more? Go to http://pily-brandon.info/. Enter O107 in the search box to learn more about \"Earwax Blockage: Care Instructions. \"  Current as of: March 20, 2017  Content Version: 11.4  © 2177-9845 [employer]. Care instructions adapted under license by [employer] (which disclaims liability or warranty for this information). If you have questions about a medical condition or this instruction, always ask your healthcare professional. [place] [address] any warranty or liability for your use of this information.

## 2018-05-30 NOTE — PROGRESS NOTES
Margarito Chandra  80 y.o. female  1934  [facility]  [id number]     [facility] Practice: Progress Note       Encounter Date: 5/30/2018    Chief Complaint   Patient presents with    Medication Refill       History provided by patient  History of Present Illness   Margarito Chandra is a 80 y.o. female who presents to clinic today for:    Hypertension: Controlled   BP Readings from Last 3 Encounters:   05/30/18 123/58   05/04/18 127/62   10/26/17 132/52     The patient reports:  taking medications as instructed, no medication side effects noted, no TIA's, no chest pain on exertion, no dyspnea on exertion, no swelling of ankles. Sodium   Date Value Ref Range Status   10/26/2017 144 134 - 144 mmol/L Final     Potassium   Date Value Ref Range Status   10/26/2017 4.8 3.5 - 5.2 mmol/L Final     Chloride   Date Value Ref Range Status   10/26/2017 104 96 - 106 mmol/L Final     Creatinine   Date Value Ref Range Status   10/26/2017 1.00 0.57 - 1.00 mg/dL Final   04/25/2017 0.84 0.57 - 1.00 mg/dL Final   10/05/2016 0.92 0.57 - 1.00 mg/dL Final     GFR est AA   Date Value Ref Range Status   10/26/2017 60 >59 mL/min/1.73 Final   04/25/2017 74 >59 mL/min/1.73 Final   10/05/2016 67 >59 mL/min/1.73 Final     GFR est non-AA   Date Value Ref Range Status   10/26/2017 52 (L) >59 mL/min/1.73 Final   04/25/2017 64 >59 mL/min/1.73 Final   10/05/2016 58 (L) >59 mL/min/1.73 Final       Our goal is to normalize the blood pressure to decrease the risks of strokes and heart attacks. The patient is in agreement with the plan. Ear wax/hearing loss  Patient reports that she frequently has bilateral wax impaction which worsens her hearing loss. Has debrox which she uses infrequently. HAs hx of senisoneural hearing loss but has declined hearing aides. Review of Systems   Review of Systems   Constitutional: Negative for chills, fever and weight loss. HENT: Negative for congestion and sinus pain. Eyes: Negative for blurred vision and double vision. Respiratory: Negative for cough, shortness of breath and wheezing. Cardiovascular: Negative for chest pain and palpitations. Genitourinary: Negative for dysuria, frequency and urgency. Skin: Negative for itching and rash. Neurological: Negative for dizziness and headaches. Vitals/Objective:     Vitals:    05/30/18 0839   BP: 123/58   Pulse: 90   Resp: 18   Temp: 98.1 °F (36.7 °C)   TempSrc: Oral   SpO2: 96%   Weight: 181 lb 3.2 oz (82.2 kg)   Height: 5' 1\" (1.549 m)     Body mass index is 34.24 kg/(m^2). Wt Readings from Last 3 Encounters:   05/30/18 181 lb 3.2 oz (82.2 kg)   05/04/18 182 lb (82.6 kg)   10/26/17 184 lb 6.4 oz (83.6 kg)       Physical Exam   Constitutional: She is oriented to person, place, and time. She appears well-developed and well-nourished. HENT:   Head: Normocephalic. Right Ear: Tympanic membrane and external ear normal. A foreign body is present. Decreased hearing is noted. Left Ear: Tympanic membrane and external ear normal. A foreign body is present. Decreased hearing is noted. Cardiovascular: Normal rate and regular rhythm. Pulmonary/Chest: Effort normal and breath sounds normal.   Musculoskeletal: She exhibits no edema or tenderness. Neurological: She is alert and oriented to person, place, and time. Skin: Skin is warm and dry. No rash noted. No erythema. No results found for this or any previous visit (from the past 24 hour(s)). Assessment and Plan:     Encounter Diagnoses     ICD-10-CM ICD-9-CM   1. Essential hypertension I10 401.9   2. Bilateral impacted cerumen H61.23 380.4   3. Sensorineural hearing loss of both ears H90.3 389.18   4. Encounter for immunization Z23 V03.89       1. Essential hypertension  Well controlled. Continue current medications. - lisinopril (PRINIVIL, ZESTRIL) 20 mg tablet; Take 1 Tab by mouth daily. Dispense: 90 Tab;  Refill: 1  - METABOLIC PANEL, BASIC  - LIPID PANEL  - TSH 3RD GENERATION    2. Bilateral impacted cerumen  Advised to return if hearing worsens due to wax for removal between appointment ,  - [place] REMOVAL IMPACTED CERUMEN IRRIGATION/LVG UNILAT  - [place] REMOVAL IMPACTED CERUMEN IRRIGATION/LVG UNILAT    3. Sensorineural hearing loss of both ears  Declined hearing aides. 4. Encounter for immunization  - pneumococcal 23-karin ps vaccine (PNEUMOVAX 23) 25 mcg/0.5 mL injection; 0.5 mL by IntraMUSCular route once for 1 dose. To be administered at pharmacy. Indications: PREVENTION OF STREPTOCOCCUS PNEUMONIAE INFECTION  Dispense: 0.5 mL; Refill: 0      I have discussed the diagnosis with the patient and the intended plan as seen in the above orders. she has expressed understanding. The patient has received an after-visit summary and questions were answered concerning future plans. I have discussed medication side effects and warnings with the patient as well. Electronically Signed: Holly Bolaños MD     History/Allergies   Patients past medical, surgical and family histories were reviewed and updated. Past Medical History:   Diagnosis Date    Hypertension     IBS (irritable bowel syndrome)     Tubular adenoma 10/2/2012      Past Surgical History:   Procedure Laterality Date    ABDOMEN SURGERY PROC UNLISTED       Family History   Problem Relation Age of Onset    Cancer Mother [de-identified]     breast cancer    Alcohol abuse Father      Social History     Social History    Marital status:      Spouse name: N/A    Number of children: N/A    Years of education: N/A     Occupational History    Not on file.      Social History Main Topics    Smoking status: Never Smoker    Smokeless tobacco: Never Used    Alcohol use No    Drug use: No    Sexual activity: Not on file     Other Topics Concern    Not on file     Social History Narrative         No Known Allergies    Disposition     Follow-up Disposition: Not on File    Future Appointments  Date Time Provider Kenyatta Vega   7/23/2018 2:10 PM Tamy Beal, RONN [facility] JIM SCHED            Current Medications after this visit     Current Outpatient Prescriptions   Medication Sig    lisinopril (PRINIVIL, ZESTRIL) 20 mg tablet Take 1 Tab by mouth daily.  pneumococcal 23-karin ps vaccine (PNEUMOVAX 23) 25 mcg/0.5 mL injection 0.5 mL by IntraMUSCular route once for 1 dose. To be administered at pharmacy. Indications: PREVENTION OF STREPTOCOCCUS PNEUMONIAE INFECTION    fluticasone (FLONASE) 50 mcg/actuation nasal spray 2 Sprays by Both Nostrils route daily.  sodium chloride (OCEAN) 0.65 % nasal squeeze bottle 0.05 mL by Both Nostrils route as needed for Congestion.  loratadine (CLARITIN) 10 mg tablet Take 1 Tab by mouth daily.  carbamide peroxide (DEBROX) 6.5 % otic solution Administer 5 Drops into each ear two (2) times a day.  TURMERIC ROOT EXTRACT PO Take  by mouth.  garlic 2,073 mg cap Take  by mouth.  melatonin 10 mg tab Take  by mouth.  cyanocobalamin (B-12 DOTS) 500 mcg tablet Take 500 mcg by mouth daily.  cinnamon bark (CINNAMON) 500 mg cap Take  by mouth.  aspirin 81 mg chewable tablet Take 81 mg by mouth daily.  cholecalciferol, VITAMIN D3, (VITAMIN D3) 5,000 unit tab tablet Take  by mouth daily.  BOSWELLIA DEB EXTRACT (BOSWELLIA DEB XT, BULK,) by Does Not Apply route.  GYMNEMA LEAF, BULK, by Does Not Apply route.  TURMERIC (CURCUMIN) Take  by mouth daily. No current facility-administered medications for this visit.       Medications Discontinued During This Encounter   Medication Reason    lisinopril (PRINIVIL, ZESTRIL) 20 mg tablet Reorder

## 2018-05-31 LAB
BUN SERPL-MCNC: 15 MG/DL (ref 8–27)
BUN/CREAT SERPL: 16 (ref 12–28)
CALCIUM SERPL-MCNC: 10 MG/DL (ref 8.7–10.3)
CHLORIDE SERPL-SCNC: 106 MMOL/L (ref 96–106)
CHOLEST SERPL-MCNC: 142 MG/DL (ref 100–199)
CO2 SERPL-SCNC: 22 MMOL/L (ref 18–29)
CREAT SERPL-MCNC: 0.93 MG/DL (ref 0.57–1)
GFR SERPLBLD CREATININE-BSD FMLA CKD-EPI: 57 ML/MIN/1.73
GFR SERPLBLD CREATININE-BSD FMLA CKD-EPI: 65 ML/MIN/1.73
GLUCOSE SERPL-MCNC: 110 MG/DL (ref 65–99)
HDLC SERPL-MCNC: 37 MG/DL
LDLC SERPL CALC-MCNC: 76 MG/DL (ref 0–99)
POTASSIUM SERPL-SCNC: 4.7 MMOL/L (ref 3.5–5.2)
SODIUM SERPL-SCNC: 140 MMOL/L (ref 134–144)
TRIGL SERPL-MCNC: 144 MG/DL (ref 0–149)
TSH SERPL DL<=0.005 MIU/L-ACNC: 1.81 UIU/ML (ref 0.45–4.5)
VLDLC SERPL CALC-MCNC: 29 MG/DL (ref 5–40)

## 2018-07-23 ENCOUNTER — OFFICE VISIT (OUTPATIENT)
Dept: FAMILY MEDICINE CLINIC | Age: 83
End: 2018-07-23

## 2018-07-23 VITALS
BODY MASS INDEX: 34.63 KG/M2 | RESPIRATION RATE: 18 BRPM | TEMPERATURE: 98.4 F | OXYGEN SATURATION: 94 % | HEIGHT: 61 IN | WEIGHT: 183.4 LBS | HEART RATE: 89 BPM | DIASTOLIC BLOOD PRESSURE: 70 MMHG | SYSTOLIC BLOOD PRESSURE: 123 MMHG

## 2018-07-23 DIAGNOSIS — Z13.39 ALCOHOL SCREENING: ICD-10-CM

## 2018-07-23 DIAGNOSIS — Z13.31 DEPRESSION SCREENING: ICD-10-CM

## 2018-07-23 DIAGNOSIS — Z00.00 MEDICARE ANNUAL WELLNESS VISIT, SUBSEQUENT: Primary | ICD-10-CM

## 2018-07-23 NOTE — MR AVS SNAPSHOT
[address] [address] 
[phone] Patient: Jennie Pozo MRN: [id number] :1934 Visit Information Date & Time Provider Department Dept. Phone Encounter #  
 2018  2:10 PM Ольга Ruvalcaba  [facility] [phone] [id number] Upcoming Health Maintenance Date Due Pneumococcal 65+ Low/Medium Risk (2 of 2 - PPSV23) 2017 GLAUCOMA SCREENING Q2Y 2018 MEDICARE YEARLY EXAM 2018 Bone Densitometry (Dexa) Screening 2019* Influenza Age 5 to Adult 2018 DTaP/Tdap/Td series (2 - Td) 2022 COLONOSCOPY 8/10/2027 *Topic was postponed. The date shown is not the original due date. Allergies as of 2018  Review Complete On: 2018 By: Ольга Ruvalcaba NP No Known Allergies Current Immunizations  Reviewed on 10/27/2015 Name Date Influenza High Dose Vaccine PF 10/26/2017 Influenza Vaccine 10/27/2015, 2014, 2013 Influenza Vaccine (Quad) PF 10/4/2016 Influenza Vaccine Split 10/6/2011, 10/29/2010 Pneumococcal Conjugate (PCV-13) 2016 TDAP Vaccine 2012  7:52 PM  
 Zoster Vaccine, Live 10/4/2016 Not reviewed this visit You Were Diagnosed With   
  
 Codes Comments Medicare annual wellness visit, subsequent    -  Primary ICD-10-CM: Z00.00 ICD-9-CM: V70.0 Alcohol screening     ICD-10-CM: Z13.89 ICD-9-CM: V79.1 Depression screening     ICD-10-CM: Z13.89 ICD-9-CM: V79.0 Vitals BP Pulse Temp Resp Height(growth percentile) Weight(growth percentile) 123/70 (BP 1 Location: Left arm, BP Patient Position: Sitting) 89 98.4 °F (36.9 °C) (Oral) 18 5' 1\" (1.549 m) 183 lb 6.4 oz (83.2 kg) LMP SpO2 BMI OB Status Smoking Status 1986 94% 34.65 kg/m2 Postmenopausal Never Smoker Vitals History BMI and BSA Data Body Mass Index Body Surface Area  
 34.65 kg/m 2 1.89 m 2 Preferred Pharmacy Pharmacy Name Phone [address] [place] [address] 79 [phone] Your Updated Medication List  
  
   
This list is accurate as of 7/23/18  2:11 PM.  Always use your most recent med list.  
  
  
  
  
 aspirin 81 mg chewable tablet Take 81 mg by mouth daily. B-12 DOTS 500 mcg tablet Generic drug:  cyanocobalamin Take 500 mcg by mouth daily. BOSWELLIA DEB XT (BULK)  
by Does Not Apply route. carbamide peroxide 6.5 % otic solution Commonly known as:  Daniel Slade Administer 5 Drops into each ear two (2) times a day. cholecalciferol (VITAMIN D3) 5,000 unit Tab tablet Commonly known as:  VITAMIN D3 Take  by mouth daily. CINNAMON 500 mg Cap Generic drug:  cinnamon bark Take  by mouth. CURCUMIN Take  by mouth daily. fluticasone 50 mcg/actuation nasal spray Commonly known as:  Violeta Shames 2 Sprays by Both Nostrils route daily. garlic 6,040 mg Cap Take  by mouth. GYMNEMA LEAF (BULK)  
by Does Not Apply route. lisinopril 20 mg tablet Commonly known as:  Kiel Robert Take 1 Tab by mouth daily. loratadine 10 mg tablet Commonly known as:  Yeny Hunting Take 1 Tab by mouth daily. melatonin 10 mg Tab Take  by mouth.  
  
 sodium chloride 0.65 % nasal squeeze bottle Commonly known as:  OCEAN  
0.05 mL by Both Nostrils route as needed for Congestion. TURMERIC ROOT EXTRACT PO Take  by mouth. Patient Instructions Eating Healthy Foods: Care Instructions Your Care Instructions Eating healthy foods can help lower your risk for disease. Healthy food gives you energy and keeps your heart strong, your brain active, your muscles working, and your bones strong.  
A healthy diet includes a variety of foods from the basic food groups: grains, vegetables, fruits, milk and milk products, and meat and beans. Some people may eat more of their favorite foods from only one food group and, as a result, miss getting the nutrients they need. So, it is important to pay attention not only to what you eat but also to what you are missing from your diet. You can eat a healthy, balanced diet by making a few small changes. Follow-up care is a key part of your treatment and safety. Be sure to make and go to all appointments, and call your doctor if you are having problems. It's also a good idea to know your test results and keep a list of the medicines you take. How can you care for yourself at home? Look at what you eat · Keep a food diary for a week or two and record everything you eat or drink. Track the number of servings you eat from each food group. · For a balanced diet every day, eat a variety of: ¨ 6 or more ounce-equivalents of grains, such as cereals, breads, crackers, rice, or pasta, every day. An ounce-equivalent is 1 slice of bread, 1 cup of ready-to-eat cereal, or ½ cup of cooked rice, cooked pasta, or cooked cereal. 
¨ 2½ cups of vegetables, especially: § Dark-green vegetables such as broccoli and spinach. § Orange vegetables such as carrots and sweet potatoes. § Dry beans (such as hicks and kidney beans) and peas (such as lentils). ¨ 2 cups of fresh, frozen, or canned fruit. A small apple or 1 banana or orange equals 1 cup. ¨ 3 cups of nonfat or low-fat milk, yogurt, or other milk products. ¨ 5½ ounces of meat and beans, such as chicken, fish, lean meat, beans, nuts, and seeds. One egg, 1 tablespoon of peanut butter, ½ ounce nuts or seeds, or ¼ cup of cooked beans equals 1 ounce of meat. · Learn how to read food labels for serving sizes and ingredients. Fast-food and convenience-food meals often contain few or no fruits or vegetables. Make sure you eat some fruits and vegetables to make the meal more nutritious. · Look at your food diary. For each food group, add up what you have eaten and then divide the total by the number of days. This will give you an idea of how much you are eating from each food group. See if you can find some ways to change your diet to make it more healthy. Start small · Do not try to make dramatic changes to your diet all at once. You might feel that you are missing out on your favorite foods and then be more likely to fail. · Start slowly, and gradually change your habits. Try some of the following: ¨ Use whole wheat bread instead of white bread. ¨ Use nonfat or low-fat milk instead of whole milk. ¨ Eat brown rice instead of white rice, and eat whole wheat pasta instead of white-flour pasta. ¨ Try low-fat cheeses and low-fat yogurt. ¨ Add more fruits and vegetables to meals and have them for snacks. ¨ Add lettuce, tomato, cucumber, and onion to sandwiches. ¨ Add fruit to yogurt and cereal. 
Enjoy food · You can still eat your favorite foods. You just may need to eat less of them. If your favorite foods are high in fat, salt, and sugar, limit how often you eat them, but do not cut them out entirely. · Eat a wide variety of foods. Make healthy choices when eating out · The type of restaurant you choose can help you make healthy choices. Even fast-food chains are now offering more low-fat or healthier choices on the menu. · Choose smaller portions, or take half of your meal home. · When eating out, try: ¨ A veggie pizza with a whole wheat crust or grilled chicken (instead of sausage or pepperoni). ¨ Pasta with roasted vegetables, grilled chicken, or marinara sauce instead of cream sauce. ¨ A vegetable wrap or grilled chicken wrap. ¨ Broiled or poached food instead of fried or breaded items. Make healthy choices easy · Buy packaged, prewashed, ready-to-eat fresh vegetables and fruits, such as baby carrots, salad mixes, and chopped or shredded broccoli and cauliflower. · Buy packaged, presliced fruits, such as melon or pineapple. · Choose 100% fruit or vegetable juice instead of soda. Limit juice intake to 4 to 6 oz (½ to ¾ cup) a day. · Blend low-fat yogurt, fruit juice, and canned or frozen fruit to make a smoothie for breakfast or a snack. Where can you learn more? Go to http://pily-brandon.info/. Enter T756 in the search box to learn more about \"Eating Healthy Foods: Care Instructions. \" Current as of: May 12, 2017 Content Version: 11.7 © 7418-3712 [employer]. Care instructions adapted under license by [employer] (which disclaims liability or warranty for this information). If you have questions about a medical condition or this instruction, always ask your healthcare professional. [address] any warranty or liability for your use of this information. Please provide this summary of care documentation to your next provider. Your primary care clinician is listed as Vasyl Vickers. If you have any questions after today's visit, please call [phone].

## 2018-07-23 NOTE — PATIENT INSTRUCTIONS
Eating Healthy Foods: Care Instructions Your Care Instructions Eating healthy foods can help lower your risk for disease. Healthy food gives you energy and keeps your heart strong, your brain active, your muscles working, and your bones strong. A healthy diet includes a variety of foods from the basic food groups: grains, vegetables, fruits, milk and milk products, and meat and beans. Some people may eat more of their favorite foods from only one food group and, as a result, miss getting the nutrients they need. So, it is important to pay attention not only to what you eat but also to what you are missing from your diet. You can eat a healthy, balanced diet by making a few small changes. Follow-up care is a key part of your treatment and safety. Be sure to make and go to all appointments, and call your doctor if you are having problems. It's also a good idea to know your test results and keep a list of the medicines you take. How can you care for yourself at home? Look at what you eat · Keep a food diary for a week or two and record everything you eat or drink. Track the number of servings you eat from each food group. · For a balanced diet every day, eat a variety of: ¨ 6 or more ounce-equivalents of grains, such as cereals, breads, crackers, rice, or pasta, every day. An ounce-equivalent is 1 slice of bread, 1 cup of ready-to-eat cereal, or ½ cup of cooked rice, cooked pasta, or cooked cereal. 
¨ 2½ cups of vegetables, especially: § Dark-green vegetables such as broccoli and spinach. § Orange vegetables such as carrots and sweet potatoes. § Dry beans (such as hicks and kidney beans) and peas (such as lentils). ¨ 2 cups of fresh, frozen, or canned fruit. A small apple or 1 banana or orange equals 1 cup. ¨ 3 cups of nonfat or low-fat milk, yogurt, or other milk products. ¨ 5½ ounces of meat and beans, such as chicken, fish, lean meat, beans, nuts, and seeds.  One egg, 1 tablespoon of peanut butter, ½ ounce nuts or seeds, or ¼ cup of cooked beans equals 1 ounce of meat. · Learn how to read food labels for serving sizes and ingredients. Fast-food and convenience-food meals often contain few or no fruits or vegetables. Make sure you eat some fruits and vegetables to make the meal more nutritious. · Look at your food diary. For each food group, add up what you have eaten and then divide the total by the number of days. This will give you an idea of how much you are eating from each food group. See if you can find some ways to change your diet to make it more healthy. Start small · Do not try to make dramatic changes to your diet all at once. You might feel that you are missing out on your favorite foods and then be more likely to fail. · Start slowly, and gradually change your habits. Try some of the following: ¨ Use whole wheat bread instead of white bread. ¨ Use nonfat or low-fat milk instead of whole milk. ¨ Eat brown rice instead of white rice, and eat whole wheat pasta instead of white-flour pasta. ¨ Try low-fat cheeses and low-fat yogurt. ¨ Add more fruits and vegetables to meals and have them for snacks. ¨ Add lettuce, tomato, cucumber, and onion to sandwiches. ¨ Add fruit to yogurt and cereal. 
Enjoy food · You can still eat your favorite foods. You just may need to eat less of them. If your favorite foods are high in fat, salt, and sugar, limit how often you eat them, but do not cut them out entirely. · Eat a wide variety of foods. Make healthy choices when eating out · The type of restaurant you choose can help you make healthy choices. Even fast-food chains are now offering more low-fat or healthier choices on the menu. · Choose smaller portions, or take half of your meal home. · When eating out, try: ¨ A veggie pizza with a whole wheat crust or grilled chicken (instead of sausage or pepperoni).  
¨ Pasta with roasted vegetables, grilled chicken, or marinara sauce instead of cream sauce. ¨ A vegetable wrap or grilled chicken wrap. ¨ Broiled or poached food instead of fried or breaded items. Make healthy choices easy · Buy packaged, prewashed, ready-to-eat fresh vegetables and fruits, such as baby carrots, salad mixes, and chopped or shredded broccoli and cauliflower. · Buy packaged, presliced fruits, such as melon or pineapple. · Choose 100% fruit or vegetable juice instead of soda. Limit juice intake to 4 to 6 oz (½ to ¾ cup) a day. · Blend low-fat yogurt, fruit juice, and canned or frozen fruit to make a smoothie for breakfast or a snack. Where can you learn more? Go to http://pily-brandon.info/. Enter T756 in the search box to learn more about \"Eating Healthy Foods: Care Instructions. \" Current as of: May 12, 2017 Content Version: 11.7 © 0070-0944 [employer]. Care instructions adapted under license by [employer] (which disclaims liability or warranty for this information). If you have questions about a medical condition or this instruction, always ask your healthcare professional. [place] [address] any warranty or liability for your use of this information. Medicare Wellness Visit, Female The best way to live healthy is to have a lifestyle where you eat a well-balanced diet, exercise regularly, limit alcohol use, and quit all forms of tobacco/nicotine, if applicable. Regular preventive services are another way to keep healthy. Preventive services (vaccines, screening tests, monitoring & exams) can help personalize your care plan, which helps you manage your own care. Screening tests can find health problems at the earliest stages, when they are easiest to treat. [facility] follows the current, evidence-based guidelines published by the [facility] (USPSTF) when recommending preventive services for our patients.  Because we follow these guidelines, sometimes recommendations change over time as research supports it. (For example, mammograms used to be recommended annually. Even though Medicare will still pay for an annual mammogram, the newer guidelines recommend a mammogram every two years for women of average risk.) Of course, you and your provider may decide to screen more often for some diseases, based on your risk and co-morbidities (chronic disease you are already diagnosed with). Preventive services for you include: - Medicare offers their members a free annual wellness visit, which is time for you and your primary care provider to discuss and plan for your preventive service needs. Take advantage of this benefit every year! 
 
-All people over age 72 should receive the recommended pneumonia vaccines. Current USPSTF guidelines recommend a series of two vaccines for the best pneumonia protection.  
 
-All adults should have a yearly flu vaccine and a tetanus vaccine every 10 years. All adults age 61 years should receive a shingles vaccine once in their lifetime.   
 
-A bone mass density test is recommended when a woman turns 65 to screen for osteoporosis. This test is only recommended once as a screening. Some providers will use this same test as a disease monitoring tool if you already have osteoporosis. -All adults age 38-68 years who are overweight should have a diabetes screening test once every three years.  
 
-Other screening tests & preventive services for persons with diabetes include: an eye exam to screen for diabetic retinopathy, a kidney function test, a foot exam, and stricter control over your cholesterol.  
 
-Cardiovascular screening for adults with routine risk involves an electrocardiogram (ECG) at intervals determined by the provider.  
 
-Colorectal cancer screenings should be done for adults age 54-65 years with normal risk. There are a number of acceptable methods of screening for this type of cancer.  Each test has its own benefits and drawbacks. Discuss with your provider what is most appropriate for you during your annual wellness visit. The different tests include: colonoscopy (considered the best screening method), a fecal occult blood test, a fecal DNA test, and sigmoidoscopy. -Breast cancer screenings are recommended every other year for women of normal risk age 54-69 years.  
 
-Cervical cancer screenings for women over age 72 are only recommended with certain risk factors.  
 
-All adults [place] [facility] be screened once for Hepatitis C. Here is a list of your current Health Maintenance items (your personalized list of preventive services) with a due date: 
Health Maintenance Due Topic Date Due  Pneumococcal Vaccine (2 of 2 - PPSV23) 07/22/2017  Glaucoma Screening   04/05/2018

## 2018-07-23 NOTE — PROGRESS NOTES
Reviewed record in preparation for visit and have necessary documentation  Pt did not bring medication to office visit for review    Goals that were addressed and/or need to be completed during or after this appointment include   Health Maintenance Due   Topic Date Due    Pneumococcal 65+ Low/Medium Risk (2 of 2 - PPSV23) 07/22/2017    GLAUCOMA SCREENING Q2Y  04/05/2018    MEDICARE YEARLY EXAM  07/20/2018

## 2018-07-23 NOTE — PROGRESS NOTES
This is the Subsequent Medicare Annual Wellness Exam, performed 12 months or more after the Initial AWV or the last Subsequent AWV    I have reviewed the patient's medical history in detail and updated the computerized patient record. History     Past Medical History:   Diagnosis Date    Hypertension     IBS (irritable bowel syndrome)     Tubular adenoma 10/2/2012      Past Surgical History:   Procedure Laterality Date    ABDOMEN SURGERY PROC UNLISTED       Current Outpatient Prescriptions   Medication Sig Dispense Refill    lisinopril (PRINIVIL, ZESTRIL) 20 mg tablet Take 1 Tab by mouth daily. 90 Tab 1    fluticasone (FLONASE) 50 mcg/actuation nasal spray 2 Sprays by Both Nostrils route daily. 1 Bottle 4    sodium chloride (OCEAN) 0.65 % nasal squeeze bottle 0.05 mL by Both Nostrils route as needed for Congestion. 44 mL 4    loratadine (CLARITIN) 10 mg tablet Take 1 Tab by mouth daily. 90 Tab 4    carbamide peroxide (DEBROX) 6.5 % otic solution Administer 5 Drops into each ear two (2) times a day. 14.79 mL 0    TURMERIC ROOT EXTRACT PO Take  by mouth.  garlic 0,935 mg cap Take  by mouth.  melatonin 10 mg tab Take  by mouth.  cyanocobalamin (B-12 DOTS) 500 mcg tablet Take 500 mcg by mouth daily.  cinnamon bark (CINNAMON) 500 mg cap Take  by mouth.  aspirin 81 mg chewable tablet Take 81 mg by mouth daily.  cholecalciferol, VITAMIN D3, (VITAMIN D3) 5,000 unit tab tablet Take  by mouth daily.  BOSWELLIA DEB EXTRACT (BOSWELLIA DEB XT, BULK,) by Does Not Apply route.  GYMNEMA LEAF, BULK, by Does Not Apply route.  TURMERIC (CURCUMIN) Take  by mouth daily. Medication reconciliation completed by MA/ LPN and reviewed by PCP.     No Known Allergies  Family History   Problem Relation Age of Onset    Cancer Mother [de-identified]     breast cancer    Alcohol abuse Father      Social History   Substance Use Topics    Smoking status: Never Smoker    Smokeless tobacco: Never Used    Alcohol use No     Patient Active Problem List   Diagnosis Code    Allergic rhinitis J30.9    HTN (hypertension) I10    Nephrolithiasis N20.0    Cholelithiasis K80.20    Tubular adenoma D36.9    Melanosis coli K63.89    Meningioma ([address] D32.9    Sensorineural hearing loss of both ears H90.3    Obesity, Class II, BMI 35-39.9 E66.9    BMI 35.0-35.9,adult Z68.35    Actinic keratoses L57.0    Parapelvic renal cyst N28.1       Depression Risk Factor Screening:     PHQ over the last two weeks 7/19/2017   Little interest or pleasure in doing things Not at all   Feeling down, depressed, irritable, or hopeless Not at all   Total Score PHQ 2 0     Patient denies thoughts of harm to self or others. Patient states there is a strong support system within friends & family. Alcohol Risk Factor Screening: You do not drink alcohol or very rarely. Functional Ability and Level of Safety:   Hearing Loss  Hearing is good. Activities of Daily Living  The home contains: grab bars  Patient needs help with:  walking . Patient has a walking cane. Patient states independence in all ADLs. She resides with her  and she has 4 supportive children. Fall Risk  Fall Risk Assessment, last 12 mths 7/19/2017   Able to walk? Yes   Fall in past 12 months? No   Fall with injury? -   Number of falls in past 12 months -   Fall Risk Score -     Patient denies falls within the past year & verbalizes awareness of fall prevention strategies.      Abuse Screen  Patient is not abused    Cognitive Screening   Evaluation of Cognitive Function:  Has your family/caregiver stated any concerns about your memory: no  Normal    Patient Care Team   Patient Care Team:  Vasyl Vickers MD as PCP - General (Family Practice)  Phong Barragan MD (Neurosurgery)    Assessment/Plan   Education and counseling provided:  Are appropriate based on today's review and evaluation  Pneumococcal Vaccine  Screening for glaucoma    Diagnoses and all orders for this visit:    1. Medicare annual wellness visit, subsequent    2. Alcohol screening    3. Depression screening    A copy of patient's completed Advanced Medical Directive is on file in the patient's medical record. NP reviewed Advanced Medical Directive document with patient & patient denies the need for changes/ updates. Health Maintenance Due   Topic Date Due    Pneumococcal 65+ Low/Medium Risk (2 of 2 - PPSV23) 07/22/2017    GLAUCOMA SCREENING Q2Y  04/05/2018     Discussed glaucoma screening and pneumococcal vaccine in detail with the patient. Discussed risks and benefits. At this time the patient declines the referral and prescription. Due to the patient's age, screening mammograms & screening colonoscopies are no longer indicated unless recommended by PCP or a specialist.    Patient verbalized understanding of all information discussed. Patient was given the opportunity to ask questions.

## 2018-11-02 NOTE — PROGRESS NOTES
Rayshawn Ash 80 y.o. female 1934 
[address] [address] 
[id number] [facility] Practice: Progress Note Encounter Date: 11/5/2018 Chief Complaint Patient presents with  
[address]  Immunization/Injection History provided by patient and spouse History of Present Illness Rayshawn Ash is a 80 y.o. female who presents to clinic today for: 
 
Fall, ER follow up Patient present with cc of fall follow up. Patient reports that she is doing well. Denies headaches, falls. Records from [facility] ER, patient seen on 10/29/2018 for laceration above left eye after GLF. Patient reports tripping over a speed bump in [facility] parking lot. She denied LOC, HA, neck pain or visual changes. CT head showed laceration with subQ air and meningioma of the right frontal lobe (Prior MRI from 6/2017 demonstrated stability of mass since 2015). Laceration was repaired with dermabond Health Maintenance Addressed Health Maintenance Due Topic Date Due  Shingrix Vaccine Age 50> (1 of 2) 01/06/1984  Pneumococcal 65+ Low/Medium Risk (2 of 2 - PPSV23) 07/22/2017  GLAUCOMA SCREENING Q2Y  04/05/2018  Influenza Age 5 to Adult  08/01/2018 Review of Systems Review of Systems Constitutional: Negative for chills and fever. Cardiovascular: Negative for chest pain and leg swelling. Musculoskeletal: Positive for falls. Negative for back pain, joint pain and myalgias. Skin: Negative for itching and rash. Neurological: Negative for dizziness, focal weakness, seizures and headaches. Vitals/Objective:  
 
Vitals:  
 11/05/18 1052 BP: 132/65 Pulse: 88 Resp: 20 Temp: 98.4 °F (36.9 °C) TempSrc: Oral  
SpO2: 98% Weight: 179 lb (81.2 kg) Height: 5' 1\" (1.549 m) Body mass index is 33.82 kg/m². Wt Readings from Last 3 Encounters:  
11/05/18 179 lb (81.2 kg) 07/23/18 183 lb 6.4 oz (83.2 kg) 05/30/18 181 lb 3.2 oz (82.2 kg) Physical Exam  
Constitutional: She appears well-developed and well-nourished. HENT:  
Head: Normocephalic. Head is with laceration (healing laceration above left eyebrow). Left Ear: External ear normal.  
Eyes: Pupils are equal, round, and reactive to light. Right eye exhibits no discharge. Left eye exhibits no discharge. Cardiovascular: Normal rate and regular rhythm. Pulmonary/Chest: Effort normal and breath sounds normal.  
Skin: Skin is warm and dry. Bruising (above and below left eye. ) noted. No abrasion, no lesion and no petechiae noted. No results found for this or any previous visit (from the past 24 hour(s)). Assessment and Plan:  
 
Encounter Diagnoses ICD-10-CM ICD-9-CM 1. Fall from ground level W18.30XA L995.6 2. Encounter for immunization Z23 V03.89  
3. Screening for glaucoma Z13.5 V80.1 1. Fall from ground level 2. Encounter for immunization 
- varicella-zoster recombinant, PF, (SHINGRIX, PF,) 50 mcg/0.5 mL susr injection; 0.5 mL by IntraMUSCular route once for 1 dose. Dispense: 0.5 mL; Refill: 0 
- pneumococcal 13 karin conj dip (PREVNAR-13) 0.5 mL syrg injection; 0.5 mL by IntraMUSCular route once for 1 dose. To be administered at pharmacy. Please fax confirmation to the attn of prescribing provider at [phone]. Dispense: 0.5 mL; Refill: 0 
- INFLUENZA VACCINE INACTIVATED (IIV), SUBUNIT, ADJUVANTED, IM 
- ADMIN INFLUENZA VIRUS VAC 3. Screening for glaucoma 
- REFERRAL TO OPTOMETRY I have discussed the diagnosis with the patient and the intended plan as seen in the above orders. she has expressed understanding. The patient has received an after-visit summary and questions were answered concerning future plans. I have discussed medication side effects and warnings with the patient as well. Electronically Signed: Arleen Martinez MD 
  
History/Allergies Patients past medical, surgical and family histories were reviewed and updated. Past Medical History:  
Diagnosis Date  Hypertension  IBS (irritable bowel syndrome)  Tubular adenoma 10/2/2012 Past Surgical History:  
Procedure Laterality Date [address] UNLISTED Family History Problem Relation Age of Onset  Cancer Mother [de-identified]  
     breast cancer  Alcohol abuse Father Social History Socioeconomic History  Marital status:  Spouse name: Not on file  Number of children: Not on file  Years of education: Not on file  Highest education level: Not on file Social Needs  Financial resource strain: Not on file  Food insecurity - worry: Not on file  Food insecurity - inability: Not on file  Transportation needs - medical: Not on file  Transportation needs - non-medical: Not on file Occupational History  Not on file Tobacco Use  Smoking status: Never Smoker  Smokeless tobacco: Never Used Substance and Sexual Activity  Alcohol use: No  
 Drug use: No  
 Sexual activity: Not on file Other Topics Concern  Not on file Social History Narrative  Not on file No Known Allergies Disposition Follow-up Disposition: 
Return in about 2 months (around 1/5/2019) for Routine with blood work. No future appointments. Current Medications after this visit Current Outpatient Medications Medication Sig  varicella-zoster recombinant, PF, (SHINGRIX, PF,) 50 mcg/0.5 mL susr injection 0.5 mL by IntraMUSCular route once for 1 dose.  pneumococcal 13 karin conj dip (PREVNAR-13) 0.5 mL syrg injection 0.5 mL by IntraMUSCular route once for 1 dose. To be administered at pharmacy. Please fax confirmation to the attn of prescribing provider at [phone].  lisinopril (PRINIVIL, ZESTRIL) 20 mg tablet Take 1 Tab by mouth daily.  sodium chloride (OCEAN) 0.65 % nasal squeeze bottle 0.05 mL by Both Nostrils route as needed for Congestion.  loratadine (CLARITIN) 10 mg tablet Take 1 Tab by mouth daily.  carbamide peroxide (DEBROX) 6.5 % otic solution Administer 5 Drops into each ear two (2) times a day.  TURMERIC ROOT EXTRACT PO Take  by mouth.  garlic 7,962 mg cap Take  by mouth.  melatonin 10 mg tab Take  by mouth.  cyanocobalamin (B-12 DOTS) 500 mcg tablet Take 500 mcg by mouth daily.  cinnamon bark (CINNAMON) 500 mg cap Take  by mouth.  aspirin 81 mg chewable tablet Take 81 mg by mouth daily.  cholecalciferol, VITAMIN D3, (VITAMIN D3) 5,000 unit tab tablet Take  by mouth daily.  BOSWELLIA DEB EXTRACT (BOSWELLIA DEB XT, BULK,) by Does Not Apply route.  GYMNEMA LEAF, BULK, by Does Not Apply route.  TURMERIC (CURCUMIN) Take  by mouth daily.  fluticasone (FLONASE) 50 mcg/actuation nasal spray 2 Sprays by Both Nostrils route daily. No current facility-administered medications for this visit. There are no discontinued medications.

## 2018-11-05 ENCOUNTER — OFFICE VISIT (OUTPATIENT)
Dept: FAMILY MEDICINE CLINIC | Age: 83
End: 2018-11-05

## 2018-11-05 VITALS
BODY MASS INDEX: 33.79 KG/M2 | HEART RATE: 88 BPM | RESPIRATION RATE: 20 BRPM | HEIGHT: 61 IN | SYSTOLIC BLOOD PRESSURE: 132 MMHG | WEIGHT: 179 LBS | OXYGEN SATURATION: 98 % | TEMPERATURE: 98.4 F | DIASTOLIC BLOOD PRESSURE: 65 MMHG

## 2018-11-05 DIAGNOSIS — Z13.5 SCREENING FOR GLAUCOMA: ICD-10-CM
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
## 2018-11-05 DIAGNOSIS — Z23 ENCOUNTER FOR IMMUNIZATION: ICD-10-CM

## 2018-11-05 DIAGNOSIS — S01.112A LACERATION OF LEFT PERIOCULAR AREA WITHOUT FOREIGN BODY, INITIAL ENCOUNTER: Primary | ICD-10-CM

## 2018-11-05 NOTE — PATIENT INSTRUCTIONS
Preventing Outdoor Falls: Care Instructions Your Care Instructions Worries about falls don't need to keep you indoors. Outdoor activities like walking have big benefits for your health. You will need to watch your step and learn a few safety measures. If you are worried about having a fall outdoors, ask your doctor about exercises, classes, or physical therapy that may help. You can learn ways to gain strength, flexibility, and balance. Ask if it might help to use a cane or walker. You can make your time outdoors safer with a few simple measures. Follow-up care is a key part of your treatment and safety. Be sure to make and go to all appointments, and call your doctor if you are having problems. It's also a good idea to know your test results and keep a list of the medicines you take. How can you prevent falls outdoors? · Wear shoes with firm soles and low heels. If you have to walk on an icy surface, use grippers that can be worn over your shoes in bad weather. · Be extra careful if weather is bad. Walk on the grass when the sidewalks are slick. If you live in a place that gets snow and ice in the winter, sprinkle salt on slippery stairs and sidewalks. · Be careful getting on or off buses and trains or getting in and out of cars. If handrails are available, use them. · Be careful when you cross the street. Look for crosswalks or places where curb cuts or ramps are present. · Try not to hurry, especially if you are carrying something. · Be cautious in parking lots or garages. There may be curbs or changes in pavement, or the height of the pavement may vary. · Make sure to wear the correct eyeglasses, if you need them. Reading glasses or bifocals can make it harder to see hazards that might be in your way. · If you are walking outdoors for exercise, try to: 
? Walk in well-lighted, well-maintained areas. These include high school or college tracks, shopping malls, and public spaces. ? Walk with a partner. ? Watch out for cracked sidewalks, curbs, changes in the height of the pavement, exposed tree roots, and debris such as fallen leaves or branches. Where can you learn more? Go to http://pily-brandon.info/. Enter R443 in the search box to learn more about \"Preventing Outdoor Falls: Care Instructions. \" Current as of: March 16, 2018 Content Version: 11.8 © 2991-0885 [employer]. Care instructions adapted under license by [employer] (which disclaims liability or warranty for this information). If you have questions about a medical condition or this instruction, always ask your healthcare professional. [place] [address] any warranty or liability for your use of this information. Influenza (Flu) Vaccine (Inactivated or Recombinant): What You Need to Know Why get vaccinated? Influenza (\"flu\") is a contagious disease that spreads around the [facility] every winter, usually between October and May. Flu is caused by influenza viruses and is spread mainly by coughing, sneezing, and close contact. Anyone can get flu. Flu strikes suddenly and can last several days. Symptoms vary by age, but can include: · Fever/chills. · Sore throat. · Muscle aches. · Fatigue. · Cough. · Headache. · Runny or stuffy nose. Flu can also lead to pneumonia and blood infections, and cause diarrhea and seizures in children. If you have a medical condition, such as heart or lung disease, flu can make it worse. Flu is more dangerous for some people. Infants and young children, people 72years of age and older, pregnant women, and people with certain health conditions or a weakened immune system are at greatest risk. Each year thousands of people in the [facility] die from flu, and many more are hospitalized. Flu vaccine can: · Keep you from getting flu. · Make flu less severe if you do get it. · Keep you from spreading flu to your family and other people. Inactivated and recombinant flu vaccines A dose of flu vaccine is recommended every flu season. Children 6 months through 6years of age may need two doses during the same flu season. Everyone else needs only one dose each flu season. Some inactivated flu vaccines contain a very small amount of a mercury-based preservative called thimerosal. Studies have not shown thimerosal in vaccines to be harmful, but flu vaccines that do not contain thimerosal are available. There is no live flu virus in flu shots. They cannot cause the flu. There are many flu viruses, and they are always changing. Each year a new flu vaccine is made to protect against three or four viruses that are likely to cause disease in the upcoming flu season. But even when the vaccine doesn't exactly match these viruses, it may still provide some protection. Flu vaccine cannot prevent: · Flu that is caused by a virus not covered by the vaccine. · Illnesses that look like flu but are not. Some people should not get this vaccine Tell the person who is giving you the vaccine: · If you have any severe (life-threatening) allergies. If you ever had a life-threatening allergic reaction after a dose of flu vaccine, or have a severe allergy to any part of this vaccine, you may be advised not to get vaccinated. Most, but not all, types of flu vaccine contain a small amount of egg protein. · If you ever had Guillain-Barré syndrome (also called GBS) Some people with a history of GBS should not get this vaccine. This should be discussed with your doctor. · If you are not feeling well. It is usually okay to get flu vaccine when you have a mild illness, but you might be asked to come back when you feel better. Risks of a vaccine reaction With any medicine, including vaccines, there is a chance of reactions.  These are usually mild and go away on their own, but serious reactions are also possible. Most people who get a flu shot do not have any problems with it. Minor problems following a flu shot include: · Soreness, redness, or swelling where the shot was given · Hoarseness · Sore, red or itchy eyes · Cough · Fever · Aches · Headache · Itching · Fatigue If these problems occur, they usually begin soon after the shot and last 1 or 2 days. More serious problems following a flu shot can include the following: · There may be a small increased risk of Guillain-Barré Syndrome (GBS) after inactivated flu vaccine. This risk has been estimated at 1 or 2 additional cases per million people vaccinated. This is much lower than the risk of severe complications from flu, which can be prevented by flu vaccine. · Ronald Lopez children who get the flu shot along with pneumococcal vaccine (PCV13) and/or DTaP vaccine at the same time might be slightly more likely to have a seizure caused by fever. Ask your doctor for more information. Tell your doctor if a child who is getting flu vaccine has ever had a seizure Problems that could happen after any injected vaccine: · People sometimes faint after a medical procedure, including vaccination. Sitting or lying down for about 15 minutes can help prevent fainting, and injuries caused by a fall. Tell your doctor if you feel dizzy, or have vision changes or ringing in the ears. · Some people get severe pain in the shoulder and have difficulty moving the arm where a shot was given. This happens very rarely. · Any medication can cause a severe allergic reaction. Such reactions from a vaccine are very rare, estimated at about 1 in a million doses, and would happen within a few minutes to a few hours after the vaccination. As with any medicine, there is a very remote chance of a vaccine causing a serious injury or death. The safety of vaccines is always being monitored. For more information, visit: www.cdc.gov/vaccinesafety/. What if there is a serious reaction? What should I look for? · Look for anything that concerns you, such as signs of a severe allergic reaction, very high fever, or unusual behavior. Signs of a severe allergic reaction can include hives, swelling of the face and throat, difficulty breathing, a fast heartbeat, dizziness, and weakness  usually within a few minutes to a few hours after the vaccination. What should I do? · If you think it is a severe allergic reaction or other emergency that can't wait, call 9-1-1 and get the person to the nearest hospital. Otherwise, call your doctor. · Reactions should be reported to the \"Vaccine Adverse Event Reporting System\" (VAERS). Your doctor should file this report, or you can do it yourself through the VAERS website at www.vaers. TEVIZZ.gov, or by calling 8-504.875.6173. VAERS does not give medical advice. The National Vaccine Injury Compensation Program 
The National Vaccine Injury Compensation Program (VICP) is a federal program that was created to compensate people who may have been injured by certain vaccines. Persons who believe they may have been injured by a vaccine can learn about the program and about filing a claim by calling 8-685.837.5665 or visiting the [facility] [employer] website at www.[facility].gov/vaccinecompensation. There is a time limit to file a claim for compensation. How can I learn more? · Ask your healthcare provider. He or she can give you the vaccine package insert or suggest other sources of information. · Call your local or state health department. · Contact the Centers for Disease Control and Prevention (CDC): 
? Call 0-841.402.8596 (1-800-CDC-INFO) or 
? Visit CDC's website at www.cdc.gov/flu Vaccine Information Statement Inactivated Influenza Vaccine 8/7/2015) 42 ARLEEN Olivares 454QZ-89 [facility] and [employer] Centers for Disease Control and Prevention Many Vaccine Information Statements are available in Maltese and other languages. See www.immunize.org/vis. Muchas hojas de información sobre vacunas están disponibles en español y en otros idiomas. Visite www.immunize.org/vis. Care instructions adapted under license by [employer] (which disclaims liability or warranty for this information). If you have questions about a medical condition or this instruction, always ask your healthcare professional. [place] [address] any warranty or liability for your use of this information.

## 2018-11-05 NOTE — PROGRESS NOTES
1. Have you been to the ER, urgent care clinic since your last visit? Hospitalized since your last visit? No 
 
2. Have you seen or consulted any other health care providers outside of the [address] [place] [address] since your last visit? Include any pap smears or colon screening. No 
Reviewed record in preparation for visit and have necessary documentation Pt did not bring medication to office visit for review Goals that were addressed and/or need to be completed during or after this appointment include Health Maintenance Due Topic Date Due  Shingrix Vaccine Age 50> (1 of 2) 01/06/1984  Pneumococcal 65+ Low/Medium Risk (2 of 2 - PPSV23) 07/22/2017  GLAUCOMA SCREENING Q2Y  04/05/2018  Influenza Age 5 to Adult  08/01/2018

## 2018-12-06 DIAGNOSIS — I10 ESSENTIAL HYPERTENSION: ICD-10-CM

## 2018-12-06 RX ORDER — LISINOPRIL 20 MG/1
TABLET ORAL
Qty: 90 TAB | Refills: 1 | Status: SHIPPED | OUTPATIENT
Start: 2018-12-06 | End: 2019-06-07 | Stop reason: DRUGHIGH

## 2019-06-04 DIAGNOSIS — I10 ESSENTIAL HYPERTENSION: ICD-10-CM

## 2019-06-04 RX ORDER — LISINOPRIL 20 MG/1
TABLET ORAL
Qty: 90 TAB | Refills: 1 | OUTPATIENT
Start: 2019-06-04

## 2019-06-04 RX ORDER — LISINOPRIL 20 MG/1
TABLET ORAL
Qty: 30 TAB | Refills: 0 | Status: SHIPPED | OUTPATIENT
Start: 2019-06-04 | End: 2019-06-07 | Stop reason: SDUPTHER

## 2019-06-07 ENCOUNTER — OFFICE VISIT (OUTPATIENT)
Dept: FAMILY MEDICINE CLINIC | Age: 84
End: 2019-06-07

## 2019-06-07 VITALS
DIASTOLIC BLOOD PRESSURE: 69 MMHG | HEART RATE: 69 BPM | BODY MASS INDEX: 33.79 KG/M2 | WEIGHT: 179 LBS | TEMPERATURE: 98.1 F | HEIGHT: 61 IN | SYSTOLIC BLOOD PRESSURE: 128 MMHG | OXYGEN SATURATION: 96 % | RESPIRATION RATE: 20 BRPM

## 2019-06-07 DIAGNOSIS — R42 LIGHTHEADEDNESS: ICD-10-CM

## 2019-06-07 DIAGNOSIS — E66.9 OBESITY, CLASS II, BMI 35-39.9: ICD-10-CM

## 2019-06-07 DIAGNOSIS — I10 ESSENTIAL HYPERTENSION: Primary | ICD-10-CM

## 2019-06-07 RX ORDER — LISINOPRIL 10 MG/1
10 TABLET ORAL DAILY
Qty: 90 TAB | Refills: 1 | Status: SHIPPED | OUTPATIENT
Start: 2019-06-07 | End: 2019-06-21 | Stop reason: SDUPTHER

## 2019-06-07 NOTE — PATIENT INSTRUCTIONS
[address] Affiliated with [address] [place] [address], [facility] [address] [facility] 
[phone] Monitor blood pressure outside the office several times weekly at different times during the day and evening. Bring the record to me in 3 weeks for review. Blood Pressure Record Patient Name:  ______________________ :  ______________________ Date/Time BP Reading Pulse Orthostatic Hypotension: Care Instructions Your Care Instructions Orthostatic hypotension is a quick drop in blood pressure. It happens when you get up from sitting or lying down. You may feel faint, lightheaded, or dizzy. When a person sits up or stands up, the body changes the way it pumps blood. This can slow the flow of blood to the brain for a very short time. And that can make you feel lightheaded. Many medicines can cause this problem, especially in older people. Lack of fluids (dehydration) or illnesses such as diabetes or heart disease also can cause it. Follow-up care is a key part of your treatment and safety. Be sure to make and go to all appointments, and call your doctor if you are having problems. It's also a good idea to know your test results and keep a list of the medicines you take. How can you care for yourself at home? · Tell your doctor about any problems you have with your medicines. · If your doctor prescribes medicine to help prevent a low blood pressure problem, take it exactly as prescribed. Call your doctor if you think you are having a problem with your medicine. · Drink plenty of fluids, enough so that your urine is light yellow or clear like water. Choose water and other caffeine-free clear liquids.  If you have kidney, heart, or liver disease and have to limit fluids, talk with your doctor before you increase the amount of fluids you drink. · Limit or avoid alcohol and caffeine. · Get up slowly from bed or after sitting for a long time. If you are in bed, roll to your side and swing your legs over the edge of the bed and onto the floor. Push your body up to a sitting position. Wait for a while before you slowly stand up. If you are dizzy or lightheaded, sit or lie down. When should you call for help? Call 911 anytime you think you may need emergency care. For example, call if: 
  · You passed out (lost consciousness).  
 Watch closely for changes in your health, and be sure to contact your doctor if: 
  · You do not get better as expected. Where can you learn more? Go to http://pily-brandon.info/. Enter A323 in the search box to learn more about \"Orthostatic Hypotension: Care Instructions. \" Current as of: July 22, 2018 Content Version: 11.9 © 5524-5791 [employer]. Care instructions adapted under license by [employer] (which disclaims liability or warranty for this information). If you have questions about a medical condition or this instruction, always ask your healthcare professional. [address] any warranty or liability for your use of this information.

## 2019-06-07 NOTE — PROGRESS NOTES
1. Have you been to the ER, urgent care clinic since your last visit? Hospitalized since your last visit? no    2. Have you seen or consulted any other health care providers outside of the [address] [place] [address] since your last visit? Include any pap smears or colon screening. no  Reviewed record in preparation for visit and have obtained necessary documentation. Patient did not bring medications to visit for review. Information provided on Advanced Directive, Living Will. Body mass index is 33.82 kg/m².    Health Maintenance Due   Topic Date Due    Shingrix Vaccine Age 49> (1 of 2) 01/06/1984    Bone Densitometry (Dexa) Screening  01/06/1999    Pneumococcal 65+ years (2 of 2 - PPSV23) 07/22/2017    GLAUCOMA SCREENING Q2Y  04/05/2018

## 2019-06-07 NOTE — PROGRESS NOTES
[facility]    Subjective:   Samir Arriaga is a 80 y.o. female with history of HTN, IBS, Tubular Adenoma  CC: Follow up HTN  History provided by patient and Records    HPI:  Hypertension Follow up:  Currently Taking Lisinopril 20 mg daily. Noting has had lightheadedness when going from sitting to standing. The patient reports:  taking medications as instructed, no medication side effects noted, patient does not perform home BP monitoring, no TIA's, no chest pain on exertion, no dyspnea on exertion, no swelling of ankles, does note some dizziness when arising. BP Readings from Last 3 Encounters:   06/07/19 122/71   11/05/18 132/65   07/23/18 123/70       PFSH:     Current Outpatient Medications on File Prior to Visit   Medication Sig Dispense Refill    lisinopril (PRINIVIL, ZESTRIL) 20 mg tablet TAKE 1 TABLET BY MOUTH ONCE DAILY 90 Tab 1    fluticasone (FLONASE) 50 mcg/actuation nasal spray 2 Sprays by Both Nostrils route daily. 1 Bottle 4    sodium chloride (OCEAN) 0.65 % nasal squeeze bottle 0.05 mL by Both Nostrils route as needed for Congestion. 44 mL 4    garlic 8,380 mg cap Take  by mouth.  cinnamon bark (CINNAMON) 500 mg cap Take  by mouth.  aspirin 81 mg chewable tablet Take 81 mg by mouth daily.  cholecalciferol, VITAMIN D3, (VITAMIN D3) 5,000 unit tab tablet Take  by mouth daily.  loratadine (CLARITIN) 10 mg tablet Take 1 Tab by mouth daily. 90 Tab 4    carbamide peroxide (DEBROX) 6.5 % otic solution Administer 5 Drops into each ear two (2) times a day. 14.79 mL 0    TURMERIC ROOT EXTRACT PO Take  by mouth.  melatonin 10 mg tab Take  by mouth.  cyanocobalamin (B-12 DOTS) 500 mcg tablet Take 500 mcg by mouth daily.  BOSWELLIA DEB EXTRACT (BOSWELLIA DEB XT, BULK,) by Does Not Apply route.  GYMNEMA LEAF, BULK, by Does Not Apply route.  TURMERIC (CURCUMIN) Take  by mouth daily.        No current facility-administered medications on file prior to visit. Patient Active Problem List   Diagnosis Code    Allergic rhinitis J30.9    HTN (hypertension) I10    Nephrolithiasis N20.0    Cholelithiasis K80.20    Tubular adenoma D36.9    Melanosis coli K63.89    Meningioma ([address] D32.9    Sensorineural hearing loss of both ears H90.3    Obesity, Class II, BMI 35-39.9 E66.9    BMI 35.0-35.9,adult Z68.35    Actinic keratoses L57.0    Parapelvic renal cyst N28.1       Social History     Socioeconomic History    Marital status:      Spouse name: Not on file    Number of children: Not on file    Years of education: Not on file    Highest education level: Not on file   Occupational History    Not on file   Social Needs    Financial resource strain: Not on file    Food insecurity:     Worry: Not on file     Inability: Not on file    Transportation needs:     Medical: Not on file     Non-medical: Not on file   Tobacco Use    Smoking status: Never Smoker    Smokeless tobacco: Never Used   Substance and Sexual Activity    Alcohol use: No    Drug use: No    Sexual activity: Not on file   Lifestyle    Physical activity:     Days per week: Not on file     Minutes per session: Not on file    Stress: Not on file   Relationships    Social connections:     Talks on phone: Not on file     Gets together: Not on file     Attends Jehovah's witness service: Not on file     Active member of club or organization: Not on file     Attends meetings of clubs or organizations: Not on file     Relationship status: Not on file    Intimate partner violence:     Fear of current or ex partner: Not on file     Emotionally abused: Not on file     Physically abused: Not on file     Forced sexual activity: Not on file   Other Topics Concern    Not on file   Social History Narrative    Not on file       Review of Systems   Cardiovascular: Negative for chest pain and palpitations. Gastrointestinal: Negative for nausea and vomiting. Neurological: Negative for loss of consciousness. Lightheadedness         Objective:     Visit Vitals  /71 (BP 1 Location: Left arm, BP Patient Position: Sitting)   Pulse (!) 56   Temp 98.1 °F (36.7 °C) (Oral)   Resp 20   Ht 5' 1\" (1.549 m)   Wt 179 lb (81.2 kg)   LMP 01/01/1986   SpO2 96%   BMI 33.82 kg/m²          Physical Exam   Constitutional: She is oriented to person, place, and time. She appears well-developed and well-nourished. HENT:   Head: Normocephalic and atraumatic. Cardiovascular: Normal rate, regular rhythm, normal heart sounds and intact distal pulses. Exam reveals no gallop and no friction rub. No murmur heard. Pulmonary/Chest: Effort normal and breath sounds normal.   Abdominal: Soft. Bowel sounds are normal.   Musculoskeletal: Normal range of motion. She exhibits no edema. Neurological: She is alert and oriented to person, place, and time. Skin: Skin is warm and dry. Nursing note and vitals reviewed. Pertinent Labs/Studies:      Assessment and orders:       ICD-10-CM ICD-9-CM    1. Essential hypertension I10 401.9 lisinopril (PRINIVIL, ZESTRIL) 10 mg tablet   2. Lightheadedness R42 780.4    3. Obesity, Class II, BMI 35-39.9 E66.9 278.00      Diagnoses and all orders for this visit:    1. Essential hypertension: Near orthostatic Hypotension. BP is very well controlled. Will decrease Lisinopril by 50% now.  -     lisinopril (PRINIVIL, ZESTRIL) 10 mg tablet; Take 1 Tab by mouth daily. 2. Lightheadedness: Near Orthostatic Hypotension. Discussed methods to prevent falls and given information in AVS on Orthostatic Hypotension    3. Obesity, Class II, BMI 35-39.9    Other orders  -     pneumococcal 23-valent (PNEUMOVAX 23) 25 mcg/0.5 mL injection; 0.5 mL by IntraMUSCular route PRIOR TO DISCHARGE for 1 dose.  -     varicella-zoster recombinant, PF, (SHINGRIX) 50 mcg/0.5 mL susr injection; 0.5 mL by IntraMUSCular route once for 1 dose.       Follow-up and Dispositions · Return in about 2 weeks (around 6/21/2019) for Hypertension. I have discussed the diagnosis with the patient and the intended plan as seen in the above orders. Social history, medical history, and labs were reviewed. The patient has received an after-visit summary and questions were answered concerning future plans. I have discussed medication side effects and warnings with the patient as well.     Kandy Grimes MD  Resident [facility]  06/07/19    Patient discussed and seen with Dr. Eric Minaya, Attending Physician

## 2019-06-21 ENCOUNTER — OFFICE VISIT (OUTPATIENT)
Dept: FAMILY MEDICINE CLINIC | Age: 84
End: 2019-06-21

## 2019-06-21 VITALS
HEIGHT: 61 IN | RESPIRATION RATE: 18 BRPM | BODY MASS INDEX: 34.02 KG/M2 | DIASTOLIC BLOOD PRESSURE: 73 MMHG | WEIGHT: 180.2 LBS | OXYGEN SATURATION: 96 % | HEART RATE: 56 BPM | TEMPERATURE: 97.5 F | SYSTOLIC BLOOD PRESSURE: 122 MMHG

## 2019-06-21 DIAGNOSIS — I10 ESSENTIAL HYPERTENSION: ICD-10-CM

## 2019-06-21 RX ORDER — LISINOPRIL 10 MG/1
10 TABLET ORAL DAILY
Qty: 90 TAB | Refills: 1 | Status: SHIPPED | OUTPATIENT
Start: 2019-06-21 | End: 2020-05-11 | Stop reason: SDUPTHER

## 2019-06-21 NOTE — PROGRESS NOTES
1. Have you been to the ER, urgent care clinic since your last visit? Hospitalized since your last visit? No    2. Have you seen or consulted any other health care providers outside of the [address] [place] [address] since your last visit? Include any pap smears or colon screening.  No  Reviewed record in preparation for visit and have necessary documentation  Pt did not bring medication to office visit for review  Information was given to pt on Advanced Directives, Living Will  Information was given on Shingles Vaccine  opportunity was given for questions  Goals that were addressed and/or need to be completed during or after this appointment include     Health Maintenance Due   Topic Date Due    Shingrix Vaccine Age 49> (1 of 2) 01/06/1984    Bone Densitometry (Dexa) Screening  01/06/1999    Pneumococcal 65+ years (2 of 2 - PPSV23) 07/22/2017    GLAUCOMA SCREENING Q2Y  04/05/2018

## 2019-06-21 NOTE — PATIENT INSTRUCTIONS
With your Lisinopril 20 mg please cut tablet in half and take one-half tab a day until you  the new Lisinopril 10 mg tablets. Valorie Sanders with [facility]  [address] [place] [address],  [address] [facility], [address] [place] [address]  [phone]    Monitor blood pressure outside the office several times weekly at different times during the day and evening. Bring the record to me in 3 weeks for review.     Blood Pressure Record     Patient Name:  ______________________ :  ______________________    Date/Time BP Reading Pulse

## 2019-06-21 NOTE — PROGRESS NOTES
[facility]    Subjective:   Sugey Gusman is a 80 y.o. female with history of HTN, IBS  CC: Follow up HTN  History provided by patient and Records    HPI:  Patient following up for Hypertension. On last visit was told to start Lisinopril 10 mg (Decreased from 20 mg) but Pharmacy filled 20 mg tablets instead. Patient with persistent issues with lightheadedness going from sitting to standing. Denies syncope, chest pain, SOB. PFSH:     Current Outpatient Medications on File Prior to Visit   Medication Sig Dispense Refill    lisinopril (PRINIVIL, ZESTRIL) 10 mg tablet Take 1 Tab by mouth daily. 90 Tab 1    fluticasone (FLONASE) 50 mcg/actuation nasal spray 2 Sprays by Both Nostrils route daily. 1 Bottle 4    sodium chloride (OCEAN) 0.65 % nasal squeeze bottle 0.05 mL by Both Nostrils route as needed for Congestion. 44 mL 4    garlic 2,023 mg cap Take  by mouth.  cinnamon bark (CINNAMON) 500 mg cap Take  by mouth.  aspirin 81 mg chewable tablet Take 81 mg by mouth daily.  cholecalciferol, VITAMIN D3, (VITAMIN D3) 5,000 unit tab tablet Take  by mouth daily.  pneumococcal 23-valent (PNEUMOVAX 23) 25 mcg/0.5 mL injection 0.5 mL by IntraMUSCular route PRIOR TO DISCHARGE for 1 dose. 1 Vial 0     No current facility-administered medications on file prior to visit.         Patient Active Problem List   Diagnosis Code    Allergic rhinitis J30.9    HTN (hypertension) I10    Nephrolithiasis N20.0    Cholelithiasis K80.20    Tubular adenoma D36.9    Melanosis coli K63.89    Meningioma ([address] D32.9    Sensorineural hearing loss of both ears H90.3    Obesity, Class II, BMI 35-39.9 E66.9    BMI 35.0-35.9,adult Z68.35    Actinic keratoses L57.0    Parapelvic renal cyst N28.1       Social History     Socioeconomic History    Marital status:      Spouse name: Not on file    Number of children: Not on file    Years of education: Not on file    Highest education level: Not on file   Occupational History    Not on file   Social Needs    Financial resource strain: Not on file    Food insecurity:     Worry: Not on file     Inability: Not on file    Transportation needs:     Medical: Not on file     Non-medical: Not on file   Tobacco Use    Smoking status: Never Smoker    Smokeless tobacco: Never Used   Substance and Sexual Activity    Alcohol use: No    Drug use: No    Sexual activity: Not on file   Lifestyle    Physical activity:     Days per week: Not on file     Minutes per session: Not on file    Stress: Not on file   Relationships    Social connections:     Talks on phone: Not on file     Gets together: Not on file     Attends [employer] service: Not on file     Active member of club or organization: Not on file     Attends meetings of clubs or organizations: Not on file     Relationship status: Not on file    Intimate partner violence:     Fear of current or ex partner: Not on file     Emotionally abused: Not on file     Physically abused: Not on file     Forced sexual activity: Not on file   Other Topics Concern    Not on file   Social History Narrative    Not on file       Review of Systems   Cardiovascular: Negative for chest pain and palpitations. Neurological: Negative for loss of consciousness. Objective:     Visit Vitals  /73 (BP 1 Location: Left arm, BP Patient Position: Standing)   Pulse (!) 56   Temp 97.5 °F (36.4 °C) (Oral)   Resp 18   Ht 5' 1\" (1.549 m)   Wt 180 lb 3.2 oz (81.7 kg)   LMP 01/01/1986   SpO2 96%   BMI 34.05 kg/m²          Physical Exam   Constitutional: She appears well-developed and well-nourished. No distress. Cardiovascular: Normal rate, regular rhythm, normal heart sounds and intact distal pulses. Exam reveals no gallop and no friction rub. No murmur heard. Pulmonary/Chest: Effort normal and breath sounds normal.   Abdominal: Soft. Bowel sounds are normal.   Nursing note and vitals reviewed.       Pertinent Labs/Studies:      Assessment and orders:       ICD-10-CM ICD-9-CM    1. Essential hypertension I10 401.9 lisinopril (PRINIVIL, ZESTRIL) 10 mg tablet     Diagnoses and all orders for this visit:    1. Essential hypertension: Decreasing Lisinopril to 10 mg daily, follow up in 2-3 weeks  -     lisinopril (PRINIVIL, ZESTRIL) 10 mg tablet; Take 1 Tab by mouth daily. Follow-up and Dispositions    · Return in about 2 weeks (around 7/5/2019) for Hypertension follow up. .       I have discussed the diagnosis with the patient and the intended plan as seen in the above orders. Social history, medical history, and labs were reviewed. The patient has received an after-visit summary and questions were answered concerning future plans. I have discussed medication side effects and warnings with the patient as well.     Kandy Grimes MD  Resident [facility]  06/21/19    Patient discussed with Dr. Eric Minaya, Attending Physician

## 2019-07-08 ENCOUNTER — OFFICE VISIT (OUTPATIENT)
Dept: FAMILY MEDICINE CLINIC | Age: 84
End: 2019-07-08

## 2019-07-08 VITALS
RESPIRATION RATE: 20 BRPM | HEIGHT: 61 IN | WEIGHT: 180 LBS | TEMPERATURE: 98.3 F | SYSTOLIC BLOOD PRESSURE: 139 MMHG | HEART RATE: 84 BPM | DIASTOLIC BLOOD PRESSURE: 72 MMHG | BODY MASS INDEX: 33.99 KG/M2 | OXYGEN SATURATION: 96 %

## 2019-07-08 DIAGNOSIS — R26.81 GAIT INSTABILITY: ICD-10-CM

## 2019-07-08 DIAGNOSIS — D32.9 MENINGIOMA (HCC): ICD-10-CM

## 2019-07-08 DIAGNOSIS — R53.81 DEBILITY: Primary | ICD-10-CM

## 2019-07-08 NOTE — PROGRESS NOTES
CC: f/u dizziness    HPI: Pt is a 80 y.o. female who presents for f/u dizziness. She is a poor historian. At her last visit her lisinopril was decreased from 20mg to 10mg with the hope that this would help decrease her dizziness. She has a home BP log that shows BP's ranging 737-382'N/66-84'O with one diastolic of 92. However she is still feeling the same. She reports she has been dealing with this for several years but reports to me that it is more of a balance issue. She denies having any dizziness or lightheadedness when changing positions or when she is walking. She does not get a sensation of stars before her eyes or blacking out and never feels like she is going to pass out. Today she says the main problem is feeling unsteady on her feet. She spends most of her day sitting in front of the TV and does not get a lot of activity. She uses a cane when she walks and a walker for longer distances. She reports some tingling in her left foot that she thinks is related to a brain tumor she has. Chart review shows that she had an MRI brain in 2012 that showed a large right-sided meningioma. She saw Dr. Kalen You, Neurosurgery at [facility], at that time and has been following with them yearly. Notes personally reviewed. Her last visit was in 6/2017 and her MRI showed no change at that time. She was supposed to go back in 18 months but it looks like she never did. The pt could not remember her last visit with Neurosurgery and thought it had been many years.        Past Medical History:   Diagnosis Date    Hypertension     IBS (irritable bowel syndrome)     Meningioma (HCC)     Tubular adenoma 10/2/2012       Family History   Problem Relation Age of Onset    Cancer Mother [de-identified]        breast cancer    Alcohol abuse Father        Social History     Tobacco Use    Smoking status: Never Smoker    Smokeless tobacco: Never Used   Substance Use Topics    Alcohol use: No    Drug use: No       ROS:  Positive only when bolded  Constitutional: HA, F/C, changes in weight  Eyes: Changes in vision  Musculoskeletal: Myalgias (occ pain in L arm that she also relates to her tumor), arthralgias  Neurological: Changes in gait, numbness/tingling    PE:  Visit Vitals  /72 (BP 1 Location: Right arm, BP Patient Position: Sitting)   Pulse 84   Temp 98.3 °F (36.8 °C) (Oral)   Resp 20   Ht 5' 1\" (1.549 m)   Wt 180 lb (81.6 kg)   LMP 01/01/1986   SpO2 96%   BMI 34.01 kg/m²     Gen: Pt sitting in chair, in NAD  Head: Normocephalic, atraumatic  Eyes: Sclera anicteric, EOM grossly intact, PERRL  Throat: MMM, normal lips, tongue and gums  Neck: Supple, no LAD, no thyromegaly or carotid bruits  CVS: Normal S1, S2  Resp: CTAB, no wheezes or rales  Extrem: Atraumatic, no cyanosis. 2+ edema to shins b/l. Pulses: 2+   Skin: Warm, dry  Neuro: Alert, hard of hearing. A/P: Pt is a 80 y.o. female who presents for f/u \"dizziness\" however today is stating that her problem is really more gait instability.   - Discussed with pt, this could be just 2/2 deconditioning and lack of physical activity and I recommend PT to help with this. She and her  do not drive anymore and it is difficult for her to get out of the house so we will do home PT  - Will also refer her back to Neurosurgery for her MRI and routine follow-up  - Continue lisinopril at 10mg daily as her BP's have been in a good range  - RTC in 3 months for f/u chronic conditions, or sooner prn      Discussed diagnoses in detail with patient. Medication risks/benefits/side effects discussed with patient. All of the patient's questions were addressed. The patient understands and agrees with our plan of care. The patient knows to call back if they are unsure of or forget any changes we discussed today or if the symptoms change.   The patient received an After-Visit Summary which contains VS, orders, medication list and allergy list. This can be used as a \"mini-medical record\" should they have to seek medical care while out of town. Current Outpatient Medications on File Prior to Visit   Medication Sig Dispense Refill    lisinopril (PRINIVIL, ZESTRIL) 10 mg tablet Take 1 Tab by mouth daily. 90 Tab 1    sodium chloride (OCEAN) 0.65 % nasal squeeze bottle 0.05 mL by Both Nostrils route as needed for Congestion. 44 mL 4    cinnamon bark (CINNAMON) 500 mg cap Take  by mouth.  aspirin 81 mg chewable tablet Take 81 mg by mouth daily.  cholecalciferol, VITAMIN D3, (VITAMIN D3) 5,000 unit tab tablet Take  by mouth daily. No current facility-administered medications on file prior to visit.

## 2019-07-08 NOTE — PATIENT INSTRUCTIONS
[address] Affiliated with [address] [place] [address], [facility] [address] [facility] 
[phone] Monitor blood pressure outside the office several times weekly at different times during the day and evening. Bring the record to me in 3 weeks for review. Blood Pressure Record Patient Name:  ______________________ :  ______________________ Date/Time BP Reading Pulse Home Blood Pressure Test: About This Test 
What is it? A home blood pressure test allows you to keep track of your blood pressure at home. Blood pressure is a measure of the force of blood against the walls of your arteries. Blood pressure readings include two numbers, such as 130/80 (say \"130 over 80\"). The first number is the systolic pressure. The second number is the diastolic pressure. Why is this test done? You may do this test at home to: · Find out if you have high blood pressure. · Track your blood pressure if you have high blood pressure. · Track how well medicine is working to reduce high blood pressure. · Check how lifestyle changes, such as weight loss and exercise, are affecting blood pressure. How can you prepare for the test? 
· Do not use caffeine, tobacco, or medicines known to raise blood pressure (such as nasal decongestant sprays) for at least 30 minutes before taking your blood pressure. · Do not exercise for at least 30 minutes before taking your blood pressure. What happens before the test? 
Take your blood pressure while you feel comfortable and relaxed.  Sit quietly with both feet on the floor for at least 5 minutes before the test. 
What happens during the test? 
· Sit with your arm slightly bent and resting on a table so that your upper arm is at the same level as your heart. · Roll up your sleeve or take off your shirt to expose your upper arm. · Wrap the blood pressure cuff around your upper arm so that the lower edge of the cuff is about 1 inch above the bend of your elbow. Proceed with the following steps depending on if you are using an automatic or manual pressure monitor. Automatic blood pressure monitors · Press the on/off button on the automatic monitor and wait until the ready-to-measure \"heart\" symbol appears next to zero in the display window. · Press the start button. The cuff will inflate and deflate by itself. · Your blood pressure numbers will appear on the screen. · Write your numbers in your log book, along with the date and time. Manual blood pressure monitors · Place the earpieces of a stethoscope in your ears, and place the bell of the stethoscope over the artery, just below the cuff. · Close the valve on the rubber inflating bulb. · Squeeze the bulb rapidly with your opposite hand to inflate the cuff until the dial or column of mercury reads about 30 mm Hg higher than your usual systolic pressure. If you do not know your usual pressure, inflate the cuff to 210 mm Hg or until the pulse at your wrist disappears. · Open the pressure valve just slightly by twisting or pressing the valve on the bulb. · As you watch the pressure slowly fall, note the level on the dial at which you first start to hear a pulsing or tapping sound through the stethoscope. This is your systolic blood pressure. · Continue letting the air out slowly. The sounds will become muffled and will finally disappear. Note the pressure when the sounds completely disappear. This is your diastolic blood pressure. Let out all the remaining air. · Write your numbers in your log book, along with the date and time.  
What else should you know about the test? 
It is more accurate to take the average of several readings made throughout the day than to rely on a single reading. It's normal for blood pressure to go up and down throughout the day. Follow-up care is a key part of your treatment and safety. Be sure to make and go to all appointments, and call your doctor if you are having problems. It's also a good idea to keep a list of the medicines you take. Where can you learn more? Go to http://pily-brandon.info/. Enter C427 in the search box to learn more about \"Home Blood Pressure Test: About This Test.\" Current as of: July 22, 2018 Content Version: 11.9 © 7206-3383 [employer], [employer]. Care instructions adapted under license by [employer] (which disclaims liability or warranty for this information). If you have questions about a medical condition or this instruction, always ask your healthcare professional. [address] any warranty or liability for your use of this information.

## 2019-07-08 NOTE — PROGRESS NOTES
1. Have you been to the ER, urgent care clinic since your last visit? Hospitalized since your last visit? No    2. Have you seen or consulted any other health care providers outside of the [address] [place] [address] since your last visit? Include any pap smears or colon screening.  No  Reviewed record in preparation for visit and have necessary documentation  Pt did not bring medication to office visit for review    Goals that were addressed and/or need to be completed during or after this appointment include   Health Maintenance Due   Topic Date Due    Shingrix Vaccine Age 49> (1 of 2) 01/06/1984    Bone Densitometry (Dexa) Screening  01/06/1999    Pneumococcal 65+ years (2 of 2 - PPSV23) 07/22/2017    GLAUCOMA SCREENING Q2Y  04/05/2018    MEDICARE YEARLY EXAM  07/24/2019

## 2019-08-20 ENCOUNTER — TELEPHONE (OUTPATIENT)
Dept: FAMILY MEDICINE CLINIC | Age: 84
End: 2019-08-20

## 2019-08-20 NOTE — LETTER
8/20/2019 3:09 PM 
 
Ms. Annabel Bell [address] [address] Dear Ms. Maxime Montoya, 
 
I received your blood pressure logs and they look great. Please continue your current medications and let me know if you have any questions, [phone] Sincerely, 
 
 
Mary Mueller MD

## 2020-05-11 ENCOUNTER — TELEPHONE (OUTPATIENT)
Dept: FAMILY MEDICINE CLINIC | Age: 85
End: 2020-05-11

## 2020-05-11 DIAGNOSIS — I10 ESSENTIAL HYPERTENSION: ICD-10-CM

## 2020-05-11 RX ORDER — LISINOPRIL 10 MG/1
10 TABLET ORAL DAILY
Qty: 30 TAB | Refills: 0 | Status: SHIPPED | OUTPATIENT
Start: 2020-05-11 | End: 2020-05-20 | Stop reason: SDUPTHER

## 2020-05-11 NOTE — TELEPHONE ENCOUNTER
----- Message from Jason Giles sent at 5/11/2020  8:50 AM EDT -----  Regarding: DR Savannah Hennessy / Kalpesh Otto Message/Vendor Calls      Pt is requesting to speak with provider.        Callback required  Best contact number(s):  [phone]              Jason Giles

## 2020-05-20 DIAGNOSIS — I10 ESSENTIAL HYPERTENSION: ICD-10-CM

## 2020-05-20 RX ORDER — LISINOPRIL 10 MG/1
10 TABLET ORAL DAILY
Qty: 30 TAB | Refills: 0 | Status: SHIPPED | OUTPATIENT
Start: 2020-05-20 | End: 2020-06-10 | Stop reason: SDUPTHER

## 2020-05-22 ENCOUNTER — OFFICE VISIT (OUTPATIENT)
Dept: FAMILY MEDICINE CLINIC | Age: 85
End: 2020-05-22

## 2020-05-22 VITALS
DIASTOLIC BLOOD PRESSURE: 52 MMHG | WEIGHT: 180 LBS | HEIGHT: 61 IN | OXYGEN SATURATION: 97 % | SYSTOLIC BLOOD PRESSURE: 125 MMHG | RESPIRATION RATE: 18 BRPM | BODY MASS INDEX: 33.99 KG/M2 | HEART RATE: 62 BPM | TEMPERATURE: 98.4 F

## 2020-05-22 DIAGNOSIS — R26.89 BALANCE PROBLEMS: ICD-10-CM

## 2020-05-22 DIAGNOSIS — R53.82 CHRONIC FATIGUE, UNSPECIFIED: ICD-10-CM

## 2020-05-22 DIAGNOSIS — R53.81 DEBILITY: ICD-10-CM

## 2020-05-22 DIAGNOSIS — R73.9 ELEVATED BLOOD SUGAR: ICD-10-CM

## 2020-05-22 DIAGNOSIS — D32.9 MENINGIOMA (HCC): ICD-10-CM

## 2020-05-22 DIAGNOSIS — E66.9 OBESITY, CLASS II, BMI 35-39.9: ICD-10-CM

## 2020-05-22 DIAGNOSIS — M79.89 RIGHT LEG SWELLING: Primary | ICD-10-CM

## 2020-05-22 RX ORDER — DOXYCYCLINE 100 MG/1
100 CAPSULE ORAL 2 TIMES DAILY
Qty: 14 CAP | Refills: 0 | Status: SHIPPED | OUTPATIENT
Start: 2020-05-22 | End: 2020-05-29

## 2020-05-22 NOTE — PROGRESS NOTES
Chief Complaint   Patient presents with    Leg Swelling     right      Visit Vitals  /52 (BP 1 Location: Right arm, BP Patient Position: Sitting)   Pulse 62   Temp 98.4 °F (36.9 °C) (Oral)   Resp 18   Ht 5' 1\" (1.549 m)   Wt 180 lb (81.6 kg)   LMP 01/01/1986   SpO2 97%   BMI 34.01 kg/m²     1. Have you been to the ER, urgent care clinic since your last visit? Hospitalized since your last visit? No    2. Have you seen or consulted any other health care providers outside of the [address] [place] [address] since your last visit? Include any pap smears or colon screening.  No    Reviewed record in preparation for visit and have necessary documentation  Pt did not bring medication to office visit for review  opportunity was given for questions  Goals that were addressed and/or need to be completed during or after this appointment include   Health Maintenance Due   Topic Date Due    Shingrix Vaccine Age 49> (1 of 2) 01/06/1984    Bone Densitometry (Dexa) Screening  01/06/1999    Pneumococcal 65+ years (2 of 2 - PPSV23) 07/22/2017    GLAUCOMA SCREENING Q2Y  04/05/2018    Medicare Yearly Exam  07/24/2019

## 2020-05-22 NOTE — PROGRESS NOTES
[facility]    History of Present Illness:   Mackenzie Acosta is a 80 y.o. female with history of Hypertension, IBS, Meningioma, Debility  CC: Right leg swelling, Falls  History provided by patient and Records    HPI:  Patient has had issues with multiple falls over the last several months with the last 2 weeks ago and since then has had significant issues. Patient is generally sedentary and only moves a small amount, generally with cane or Walker. Patient was previously seen several months ago and PT/OT was never started and patient never reestablished with Neurosurgery either. Per records, unclear if heard from [facility], but appears did not want to setup neurosurgery follow up on last referral.  Patient has not seen Neurosurgery in at least 2 years at this time, was getting yearly MRI to track Meningioma. Patient reports that when she is walking she has a tendency to fall to the left, though on this last time she fell down on to her knees. Denies knee pains and reports that she can walk without difficulty (Other than baseline) on her right leg. The swelling does improve with elevation. Patient denies any known history of Blood clots. Patient notes she feels mild tenderness when she touches her leg, but overall pain is minimal.  Denies fevers, chills, chest pains, SOB, bleeding, hemoptysis. In terms of Debility/Difficulty walking the patient reports that she does not have dizziness, lightheadedness, LOC. SHe notes that she just tends to fall to the left. Patient uses walker and can in home but notes she generally only sits and watches TV, only getting up from time to time to fix food or use the bathroom. Family members check on her daily, but she lives alone. She denies any depressed mood.           Health Maintenance  Health Maintenance Due   Topic Date Due    Shingrix Vaccine Age 49> (1 of 2) 01/06/1984    Bone Densitometry (Dexa) Screening  01/06/1999    Pneumococcal 65+ years (2 of 2 - PPSV23) 07/22/2017    GLAUCOMA SCREENING Q2Y  04/05/2018    Medicare Yearly Exam  07/24/2019       Past Medical, Family, and Social History:     Current Outpatient Medications on File Prior to Visit   Medication Sig Dispense Refill    lisinopriL (PRINIVIL, ZESTRIL) 10 mg tablet Take 1 Tab by mouth daily. 30 Tab 0    sodium chloride (OCEAN) 0.65 % nasal squeeze bottle 0.05 mL by Both Nostrils route as needed for Congestion. 44 mL 4    cinnamon bark (CINNAMON) 500 mg cap Take  by mouth.  aspirin 81 mg chewable tablet Take 81 mg by mouth daily.  cholecalciferol, VITAMIN D3, (VITAMIN D3) 5,000 unit tab tablet Take  by mouth daily. No current facility-administered medications on file prior to visit.         Patient Active Problem List   Diagnosis Code    Allergic rhinitis J30.9    HTN (hypertension) I10    Nephrolithiasis N20.0    Cholelithiasis K80.20    Tubular adenoma D36.9    Melanosis coli K63.89    Meningioma ([address] D32.9    Sensorineural hearing loss of both ears H90.3    Obesity, Class II, BMI 35-39.9 E66.9    BMI 35.0-35.9,adult Z68.35    Actinic keratoses L57.0    Parapelvic renal cyst N28.1       Social History     Socioeconomic History    Marital status:      Spouse name: Not on file    Number of children: Not on file    Years of education: Not on file    Highest education level: Not on file   Occupational History    Not on file   Social Needs    Financial resource strain: Not on file    Food insecurity     Worry: Not on file     Inability: Not on file    Transportation needs     Medical: Not on file     Non-medical: Not on file   Tobacco Use    Smoking status: Never Smoker    Smokeless tobacco: Never Used   Substance and Sexual Activity    Alcohol use: No    Drug use: No    Sexual activity: Not on file   Lifestyle    Physical activity     Days per week: Not on file     Minutes per session: Not on file    Stress: Not on file   Relationships    Social connections     Talks on phone: Not on file     Gets together: Not on file     Attends Judaism service: Not on file     Active member of club or organization: Not on file     Attends meetings of clubs or organizations: Not on file     Relationship status: Not on file    Intimate partner violence     Fear of current or ex partner: Not on file     Emotionally abused: Not on file     Physically abused: Not on file     Forced sexual activity: Not on file   Other Topics Concern    Not on file   Social History Narrative    Not on file       Review of Systems   Review of Systems   Constitutional: Negative for chills and fever. Cardiovascular: Positive for leg swelling. Gastrointestinal: Negative for abdominal pain, nausea and vomiting. Skin: Negative for rash. Neurological:        Debility       Objective:     Visit Vitals  /52 (BP 1 Location: Right arm, BP Patient Position: Sitting)   Pulse 62   Temp 98.4 °F (36.9 °C) (Oral)   Resp 18   Ht 5' 1\" (1.549 m)   Wt 180 lb (81.6 kg)   LMP 01/01/1986   SpO2 97%   BMI 34.01 kg/m²        Physical Exam  Vitals signs and nursing note reviewed. Constitutional:       Appearance: Normal appearance. HENT:      Head: Normocephalic and atraumatic. Nose: No congestion. Neck:      Musculoskeletal: Normal range of motion and neck supple. Cardiovascular:      Rate and Rhythm: Normal rate and regular rhythm. Pulses: Normal pulses. Heart sounds: No murmur. No friction rub. No gallop. Pulmonary:      Effort: Pulmonary effort is normal.      Breath sounds: Normal breath sounds. Abdominal:      General: Abdomen is flat. Bowel sounds are normal.      Palpations: Abdomen is soft. Musculoskeletal:      Comments: Swelling in the lower right leg   Skin:     Comments: Mild redness of the leg, mild increased heat, no drainage or obvious wound present   Neurological:      Mental Status: She is alert.          Pertinent Labs/Studies:      Assessment and orders:       ICD-10-CM ICD-9-CM    1. Right leg swelling M79.89 729.81 D DIMER      DUPLEX LOWER EXT VENOUS RIGHT      doxycycline (MONODOX) 100 mg capsule   2. Elevated blood sugar R73.9 790.29 HEMOGLOBIN A1C WITH EAG   3. Debility R53.81 799.3 REFERRAL TO HOME HEALTH   4. Balance problems J42.65 774.41 METABOLIC PANEL, COMPREHENSIVE      CBC W/O DIFF      TSH 3RD GENERATION      REFERRAL TO HOME HEALTH   5. Meningioma (HCC) D32.9 225.2 REFERRAL TO NEUROSURGERY   6. Obesity, Class II, BMI 35-39.9 E66.9 278.00 LIPID PANEL   7. Chronic fatigue, unspecified  R53.82 780.79 TSH 3RD GENERATION     Diagnoses and all orders for this visit:    1. Right leg swelling: Will rule out DVT, Treat for possible infection. D-dimer now, but given history of limited activity increased concern for DVT and history of Meningioma. -     D DIMER; Future  -     DUPLEX LOWER EXT VENOUS RIGHT; Future  -     doxycycline (MONODOX) 100 mg capsule; Take 1 Cap by mouth two (2) times a day for 7 days. 2. Elevated blood sugar  -     HEMOGLOBIN A1C WITH EAG; Future    3. Debility: Referral to [facility], advised daughter of patient need for HH.  -     REFERRAL TO HOME HEALTH    4. Balance problems: HH ordered, also get labs, last labs in 8088.  -     METABOLIC PANEL, COMPREHENSIVE; Future  -     CBC W/O DIFF; Future  -     TSH 3RD GENERATION; Future  -     REFERRAL TO HOME HEALTH    5. Meningioma [facility]): Needs to follow up with specialits.  -     REFERRAL TO NEUROSURGERY    6. Obesity, Class II, BMI 35-39.9  -     LIPID PANEL; Future    7. Chronic fatigue, unspecified   -     TSH 3RD GENERATION; Future      Follow-up and Dispositions    · Return in about 1 week (around 5/29/2020) for Follow up labs. I have discussed the diagnosis with the patient and the intended plan as seen in the above orders. Social history, medical history, and labs were reviewed.   The patient has received an after-visit summary and questions were answered concerning future plans. I have discussed medication side effects and warnings with the patient as well.     MD KRIS Douglas & NAT OHARA [facility]  05/22/20

## 2020-05-27 ENCOUNTER — HOSPITAL ENCOUNTER (OUTPATIENT)
Dept: LAB | Age: 85
Discharge: HOME | End: 2020-05-27

## 2020-05-27 DIAGNOSIS — R73.9 ELEVATED BLOOD SUGAR: ICD-10-CM

## 2020-05-27 DIAGNOSIS — M79.89 RIGHT LEG SWELLING: ICD-10-CM

## 2020-05-27 DIAGNOSIS — E66.9 OBESITY, CLASS II, BMI 35-39.9: ICD-10-CM

## 2020-05-27 DIAGNOSIS — R26.89 BALANCE PROBLEMS: ICD-10-CM

## 2020-05-27 DIAGNOSIS — R53.82 CHRONIC FATIGUE, UNSPECIFIED: ICD-10-CM

## 2020-05-27 LAB
ALBUMIN SERPL-MCNC: 3.3 G/DL (ref 3.5–5)
ALBUMIN/GLOB SERPL: 1.1 {RATIO} (ref 1.1–2.2)
ALP SERPL-CCNC: 66 U/L (ref 45–117)
ALT SERPL-CCNC: 16 U/L (ref 12–78)
ANION GAP SERPL CALC-SCNC: 7 MMOL/L (ref 5–15)
AST SERPL-CCNC: 18 U/L (ref 15–37)
BILIRUB SERPL-MCNC: 0.3 MG/DL (ref 0.2–1)
BUN SERPL-MCNC: 14 MG/DL (ref 6–20)
BUN/CREAT SERPL: 16 (ref 12–20)
CALCIUM SERPL-MCNC: 9.6 MG/DL (ref 8.5–10.1)
CHLORIDE SERPL-SCNC: 111 MMOL/L (ref 97–108)
CHOLEST SERPL-MCNC: 137 MG/DL
CO2 SERPL-SCNC: 24 MMOL/L (ref 21–32)
CREAT SERPL-MCNC: 0.88 MG/DL (ref 0.55–1.02)
ERYTHROCYTE [DISTWIDTH] IN BLOOD BY AUTOMATED COUNT: 12.2 % (ref 11.5–14.5)
EST. AVERAGE GLUCOSE BLD GHB EST-MCNC: 126 MG/DL
GLOBULIN SER CALC-MCNC: 3.1 G/DL (ref 2–4)
GLUCOSE SERPL-MCNC: 111 MG/DL (ref 65–100)
HBA1C MFR BLD: 6 % (ref 4–5.6)
HCT VFR BLD AUTO: 35.5 % (ref 35–47)
HDLC SERPL-MCNC: 41 MG/DL
HDLC SERPL: 3.3 {RATIO} (ref 0–5)
HGB BLD-MCNC: 11.5 G/DL (ref 11.5–16)
LDLC SERPL CALC-MCNC: 71.2 MG/DL (ref 0–100)
LIPID PROFILE,FLP: NORMAL
MCH RBC QN AUTO: 32.2 PG (ref 26–34)
MCHC RBC AUTO-ENTMCNC: 32.4 G/DL (ref 30–36.5)
MCV RBC AUTO: 99.4 FL (ref 80–99)
NRBC # BLD: 0 K/UL (ref 0–0.01)
NRBC BLD-RTO: 0 PER 100 WBC
PLATELET # BLD AUTO: 289 K/UL (ref 150–400)
PMV BLD AUTO: 11.4 FL (ref 8.9–12.9)
POTASSIUM SERPL-SCNC: 4.2 MMOL/L (ref 3.5–5.1)
PROT SERPL-MCNC: 6.4 G/DL (ref 6.4–8.2)
RBC # BLD AUTO: 3.57 M/UL (ref 3.8–5.2)
SODIUM SERPL-SCNC: 142 MMOL/L (ref 136–145)
TRIGL SERPL-MCNC: 124 MG/DL (ref ?–150)
TSH SERPL DL<=0.05 MIU/L-ACNC: 1.67 UIU/ML (ref 0.36–3.74)
VLDLC SERPL CALC-MCNC: 24.8 MG/DL
WBC # BLD AUTO: 5.7 K/UL (ref 3.6–11)

## 2020-05-28 ENCOUNTER — HOSPITAL ENCOUNTER (EMERGENCY)
Age: 85
Discharge: HOME | End: 2020-05-28
Attending: EMERGENCY MEDICINE
Payer: MEDICARE

## 2020-05-28 ENCOUNTER — HOSPITAL ENCOUNTER (OUTPATIENT)
Dept: VASCULAR SURGERY | Age: 85
Discharge: HOME | End: 2020-05-28
Attending: FAMILY MEDICINE
Payer: MEDICARE

## 2020-05-28 VITALS
TEMPERATURE: 98.5 F | SYSTOLIC BLOOD PRESSURE: 118 MMHG | DIASTOLIC BLOOD PRESSURE: 50 MMHG | HEART RATE: 53 BPM | WEIGHT: 180 LBS | BODY MASS INDEX: 33.99 KG/M2 | RESPIRATION RATE: 16 BRPM | HEIGHT: 61 IN | OXYGEN SATURATION: 98 %

## 2020-05-28 DIAGNOSIS — I82.411 ACUTE DEEP VEIN THROMBOSIS (DVT) OF FEMORAL VEIN OF RIGHT LOWER EXTREMITY (HCC): Primary | ICD-10-CM

## 2020-05-28 DIAGNOSIS — M79.89 RIGHT LEG SWELLING: ICD-10-CM

## 2020-05-28 LAB — D DIMER PPP FEU-MCNC: 2.5 MG/L FEU (ref 0–0.65)

## 2020-05-28 PROCEDURE — 93971 EXTREMITY STUDY: CPT

## 2020-05-28 PROCEDURE — 99283 EMERGENCY DEPT VISIT LOW MDM: CPT

## 2020-05-28 RX ORDER — RIVAROXABAN 15 MG-20MG
KIT ORAL
Qty: 1 DOSE PACK | Refills: 0 | Status: SHIPPED | OUTPATIENT
Start: 2020-05-28 | End: 2020-07-01 | Stop reason: DRUGHIGH

## 2020-05-28 RX ORDER — APIXABAN 5 MG (74)
KIT ORAL
Qty: 1 DOSE PACK | Refills: 0 | Status: SHIPPED | OUTPATIENT
Start: 2020-05-28 | End: 2020-05-28

## 2020-05-28 NOTE — PROGRESS NOTES
Stable Predibetic range A1C. TSH wnl.  CMP unremarkable.   Lipid Panel wnl, CBC unremarkable, awaiting D-Dimer

## 2020-05-28 NOTE — DISCHARGE INSTRUCTIONS
Patient Education        Deep Vein Thrombosis: Care Instructions  Overview     A deep vein thrombosis (DVT) is a blood clot in certain veins, usually in the legs, pelvis, or arms. Blood clots in these veins need to be treated because they can get bigger, break loose, and travel through the bloodstream to the lungs. A blood clot in a lung can be life-threatening. The doctor may have given you a blood thinner (anticoagulant). A blood thinner can stop the blood clot from growing larger and prevent new clots from forming. You will need to take a blood thinner for 3 to 6 months or longer. The doctor has checked you carefully, but problems can develop later. If you notice any problems or new symptoms, get medical treatment right away. Follow-up care is a key part of your treatment and safety. Be sure to make and go to all appointments, and call your doctor if you are having problems. It's also a good idea to know your test results and keep a list of the medicines you take. How can you care for yourself at home? · Take your medicines exactly as prescribed. Call your doctor if you think you are having a problem with your medicine. · If you are taking a blood thinner, be sure you get instructions about how to take your medicine safely. Blood thinners can cause serious bleeding problems. · Wear compression stockings if your doctor recommends them. These stockings are tighter at the feet than on the legs. They may reduce pain and swelling in your legs. But there are different types of stockings, and they need to fit right. So your doctor will recommend what you need. · When you sit, use a pillow to raise the arm or leg that has the blood clot. Try to keep it above the level of your heart. When should you call for help? [id number] anytime you think you may need emergency care. For example, call if:  · You passed out (lost consciousness). · You have symptoms of a blood clot in your lung (called a pulmonary embolism). These include:  ? Sudden chest pain. ? Trouble breathing. ? Coughing up blood. Call your doctor now or seek immediate medical care if:  · You have new or worse trouble breathing. · You are dizzy or lightheaded, or you feel like you may faint. · You have symptoms of a blood clot in your arm or leg. These may include:  ? Pain in the arm, calf, back of the knee, thigh, or groin. ? Redness and swelling in the arm, leg, or groin. Watch closely for changes in your health, and be sure to contact your doctor if:  · You do not get better as expected. Where can you learn more? Go to http://pily-brandon.info/  Enter R326 in the search box to learn more about \"Deep Vein Thrombosis: Care Instructions. \"  Current as of: March 4, 2020               Content Version: 12.5  © 3119-2445 [employer], [employer]. Care instructions adapted under license by [employer] (which disclaims liability or warranty for this information). If you have questions about a medical condition or this instruction, always ask your healthcare professional. [address] any warranty or liability for your use of this information.

## 2020-05-28 NOTE — PROGRESS NOTES
Right LE venous duplex completed. Attempted to call positive results into Dr. Gagnon [place] office and unable to reach anyone. Sent pt to ED per protocol. Final results to follow.

## 2020-05-28 NOTE — PROGRESS NOTES
Prediabetic Range A1C, TSH wnl, CMP unremarkable escept for mildly elevated BG, Lipid Panel wnl. CBC unremarkable.

## 2020-05-28 NOTE — ED PROVIDER NOTES
81yo female with PMH of Hypertension, IBS, Meningioma, Debility presenting to the ED with R sided DVT. Patient went to PCP last week for R LE swelling. PCP ordered a duplex of her LE which was completed this morning and was positive for a DVT. Patient reports R LE calf tenderness. Denies SOB, chest pain, or any difficulty breathing. She denies recent injury/trauma, known cancer, recent surgery, recent travel/long car or plane ride, or estrogen use. Patient has a remote hx of smoking for about one year.            Past Medical History:   Diagnosis Date    Hypertension     IBS (irritable bowel syndrome)     Meningioma (HCC)     Tubular adenoma 10/2/2012       Past Surgical History:   Procedure Laterality Date    ABDOMEN SURGERY PROC UNLISTED           Family History:   Problem Relation Age of Onset    Cancer Mother [de-identified]        breast cancer    Alcohol abuse Father        Social History     Socioeconomic History    Marital status:      Spouse name: Not on file    Number of children: Not on file    Years of education: Not on file    Highest education level: Not on file   Occupational History    Not on file   Social Needs    Financial resource strain: Not on file    Food insecurity     Worry: Not on file     Inability: Not on file    Transportation needs     Medical: Not on file     Non-medical: Not on file   Tobacco Use    Smoking status: Never Smoker    Smokeless tobacco: Never Used   Substance and Sexual Activity    Alcohol use: No    Drug use: No    Sexual activity: Not on file   Lifestyle    Physical activity     Days per week: Not on file     Minutes per session: Not on file    Stress: Not on file   Relationships    Social connections     Talks on phone: Not on file     Gets together: Not on file     Attends Protestant service: Not on file     Active member of club or organization: Not on file     Attends meetings of clubs or organizations: Not on file     Relationship status: Not on file  Intimate partner violence     Fear of current or ex partner: Not on file     Emotionally abused: Not on file     Physically abused: Not on file     Forced sexual activity: Not on file   Other Topics Concern    Not on file   Social History Narrative    Not on file         ALLERGIES: Patient has no known allergies. Review of Systems   Constitutional: Negative for activity change, appetite change, chills and fever. HENT: Negative for congestion and sore throat. Eyes: Negative for pain and redness. Respiratory: Negative for cough, chest tightness and shortness of breath. Cardiovascular: Positive for leg swelling. Negative for chest pain. Gastrointestinal: Negative for abdominal pain, nausea and vomiting. Genitourinary: Negative for dysuria and frequency. Musculoskeletal: Negative for myalgias. Skin: Negative for pallor and rash. Neurological: Negative for dizziness, weakness, light-headedness and headaches. Psychiatric/Behavioral: Negative for agitation and behavioral problems. Vitals:    05/28/20 1020 05/28/20 1034   BP: 145/57    Pulse: (!) 53    Resp: 16    Temp: 98.5 °F (36.9 °C)    SpO2: 100% 100%   Weight: 81.6 kg (180 lb)    Height: 5' 1\" (1.549 m)             Physical Exam  Constitutional:       Appearance: Normal appearance. HENT:      Head: Normocephalic and atraumatic. Eyes:      General: No scleral icterus. Conjunctiva/sclera: Conjunctivae normal.   Neck:      Musculoskeletal: Neck supple. Cardiovascular:      Rate and Rhythm: Regular rhythm. Bradycardia present. Heart sounds: No murmur. No friction rub. No gallop. Pulmonary:      Effort: No respiratory distress. Breath sounds: No wheezing, rhonchi or rales. Abdominal:      General: Abdomen is flat. There is no distension. Palpations: Abdomen is soft. Tenderness: There is no abdominal tenderness. There is no guarding. Musculoskeletal:         General: Swelling and tenderness present. Comments: Swelling is located in RLE  Mild R calf tenderness   Skin:     General: Skin is warm and dry. Capillary Refill: Capillary refill takes less than 2 seconds. Findings: No rash. Neurological:      General: No focal deficit present. Mental Status: She is alert and oriented to person, place, and time. Psychiatric:         Mood and Affect: Mood normal.         Behavior: Behavior normal.          MDM  Number of Diagnoses or Management Options  Diagnosis management comments: 79yo female who presented to the ED with a known R LE DVT. Recently diagnosed with LE duplex this morning after having RLE swelling x2weeks. Her risk factors include her age. Otherwise she denies injury/trauma, known cancer, recent surgery, recent travel/long car or plane ride, or estrogen use. She had labs done yesterday from her PCP which showed normal kidney function, normal Hg, and normal platelets. In the ED, her RR was normal and her sats were 100% and she denied SOB or chest pain. Patient will be discharged with Eliquis and instructed to follow up with her PCP.          Procedures

## 2020-05-28 NOTE — ED TRIAGE NOTES
80 yr old female brought by [facility] from outpt imaging for DVT in right leg. Denies SOB or chest pain. Right leg has been swollen for about 2 weeks.

## 2020-05-29 ENCOUNTER — PATIENT OUTREACH (OUTPATIENT)
Dept: FAMILY MEDICINE CLINIC | Age: 85
End: 2020-05-29

## 2020-05-29 NOTE — PROGRESS NOTES
Patient contacted regarding recent discharge and COVID-19 risk. Discussed COVID-19 related testing which was not done at this time. Test results were not done. Patient informed of results, if available? no    Care Transition Nurse/ Ambulatory Care Manager contacted the patient by telephone to perform post discharge assessment. Verified name and  with patient as identifiers. Patient has following risk factors of: no known risk factors. CTN/ACM reviewed discharge instructions, medical action plan and red flags related to discharge diagnosis. Reviewed and educated them on any new and changed medications related to discharge diagnosis. Advised obtaining a 90-day supply of all daily and as-needed medications. Education provided regarding infection prevention, and signs and symptoms of COVID-19 and when to seek medical attention with patient who verbalized understanding. Discussed exposure protocols and quarantine from [address] you at higher risk for severe illness  and given an opportunity for questions and concerns. The patient agrees to contact the COVID-19 hotline [phone] or PCP office for questions related to their healthcare. CTN/ACM provided contact information for future reference. From CDC: Are you at higher risk for severe illness?  Wash your hands often.  Avoid close contact (6 feet, which is about two arm lengths) with people who are sick.  Put distance between yourself and other people if COVID-19 is spreading in your community.  Clean and disinfect frequently touched surfaces.  Avoid all cruise travel and non-essential air travel.  Call your healthcare professional if you have concerns about COVID-19 and your underlying condition or if you are sick.     For more information on steps you can take to protect yourself, see CDC's How to Protect Yourself      Patient/family/caregiver given information for [facility] and agrees to enroll no      Plan for follow-up call in 7-14 days based on severity of symptoms and risk factors.

## 2020-06-10 DIAGNOSIS — I10 ESSENTIAL HYPERTENSION: ICD-10-CM

## 2020-06-10 RX ORDER — LISINOPRIL 10 MG/1
10 TABLET ORAL DAILY
Qty: 90 TAB | Refills: 1 | Status: SHIPPED | OUTPATIENT
Start: 2020-06-10 | End: 2020-06-12 | Stop reason: SDUPTHER

## 2020-06-11 ENCOUNTER — TELEPHONE (OUTPATIENT)
Dept: FAMILY MEDICINE CLINIC | Age: 85
End: 2020-06-11

## 2020-06-12 ENCOUNTER — OFFICE VISIT (OUTPATIENT)
Dept: FAMILY MEDICINE CLINIC | Age: 85
End: 2020-06-12

## 2020-06-12 ENCOUNTER — PATIENT OUTREACH (OUTPATIENT)
Dept: FAMILY MEDICINE CLINIC | Age: 85
End: 2020-06-12

## 2020-06-12 VITALS
BODY MASS INDEX: 33.99 KG/M2 | RESPIRATION RATE: 18 BRPM | SYSTOLIC BLOOD PRESSURE: 110 MMHG | HEIGHT: 61 IN | WEIGHT: 180 LBS | HEART RATE: 61 BPM | TEMPERATURE: 98.6 F | DIASTOLIC BLOOD PRESSURE: 65 MMHG | OXYGEN SATURATION: 96 %

## 2020-06-12 DIAGNOSIS — R42 DIZZINESS: ICD-10-CM

## 2020-06-12 DIAGNOSIS — I10 ESSENTIAL HYPERTENSION: Primary | ICD-10-CM
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
## 2020-06-12 RX ORDER — LISINOPRIL 10 MG/1
10 TABLET ORAL DAILY
Qty: 90 TAB | Refills: 1 | Status: SHIPPED | OUTPATIENT
Start: 2020-06-12 | End: 2020-12-09 | Stop reason: SDUPTHER

## 2020-06-12 NOTE — PROGRESS NOTES
[facility]    History of Present Illness:   Sagar Govea is a 80 y.o. female with history of HTN, DVT, IBS, Tubular Adenoma  CC: Follow up ER, Dizziness/Low blood pressure  History provided by patient and Records    HPI:  Dizziness: Develops dizziness with going from sitting to standing. Notes that she takes her Lisinopril at night. Fall:Fall 2 days ago and seen in the ER after fall. Reports developed dizziness after going to sitting up. When turning to sit in chair she fell, but does not remember the total fall, had head imaging. Patient has no pain. Meningioma: Follow up with Neuro and MRI next week. Health Maintenance  Health Maintenance Due   Topic Date Due    Shingrix Vaccine Age 49> (1 of 2) 01/06/1984    Bone Densitometry (Dexa) Screening  01/06/1999    Pneumococcal 65+ years (2 of 2 - PPSV23) 07/22/2017    GLAUCOMA SCREENING Q2Y  04/05/2018    Medicare Yearly Exam  07/24/2019       Past Medical, Family, and Social History:     Current Outpatient Medications on File Prior to Visit   Medication Sig Dispense Refill    rivaroxaban (Xarelto) 15 mg (42)- 20 mg (9) DsPk Take one 15 mg tablet twice a day with food for the first 21 days. Then, take one 20 mg tablet once a day with food for 9 days. 1 Dose Pack 0    sodium chloride (OCEAN) 0.65 % nasal squeeze bottle 0.05 mL by Both Nostrils route as needed for Congestion. 44 mL 4    cinnamon bark (CINNAMON) 500 mg cap Take  by mouth.  aspirin 81 mg chewable tablet Take 81 mg by mouth daily.  cholecalciferol, VITAMIN D3, (VITAMIN D3) 5,000 unit tab tablet Take  by mouth daily.  lisinopriL (PRINIVIL, ZESTRIL) 10 mg tablet Take 1 Tab by mouth daily. 90 Tab 1     No current facility-administered medications on file prior to visit.         Patient Active Problem List   Diagnosis Code    Allergic rhinitis J30.9    HTN (hypertension) I10    Nephrolithiasis N20.0    Cholelithiasis K80.20    Tubular adenoma D36.9    Melanosis coli K63.89    Meningioma (HCC) D32.9    Sensorineural hearing loss of both ears H90.3    Obesity, Class II, BMI 35-39.9 E66.9    BMI 35.0-35.9,adult Z68.35    Actinic keratoses L57.0    Parapelvic renal cyst N28.1       Social History     Socioeconomic History    Marital status:      Spouse name: Not on file    Number of children: Not on file    Years of education: Not on file    Highest education level: Not on file   Occupational History    Not on file   Social Needs    Financial resource strain: Not on file    Food insecurity     Worry: Not on file     Inability: Not on file    Transportation needs     Medical: Not on file     Non-medical: Not on file   Tobacco Use    Smoking status: Never Smoker    Smokeless tobacco: Never Used   Substance and Sexual Activity    Alcohol use: No    Drug use: No    Sexual activity: Not on file   Lifestyle    Physical activity     Days per week: Not on file     Minutes per session: Not on file    Stress: Not on file   Relationships    Social connections     Talks on phone: Not on file     Gets together: Not on file     Attends Taoism service: Not on file     Active member of club or organization: Not on file     Attends meetings of clubs or organizations: Not on file     Relationship status: Not on file    Intimate partner violence     Fear of current or ex partner: Not on file     Emotionally abused: Not on file     Physically abused: Not on file     Forced sexual activity: Not on file   Other Topics Concern    Not on file   Social History Narrative    Not on file       Review of Systems   Review of Systems   Constitutional: Negative for chills and fever. Cardiovascular: Negative for chest pain and palpitations. Gastrointestinal: Negative for nausea and vomiting. Musculoskeletal: Positive for falls. Neurological: Positive for dizziness.        Objective:     Visit Vitals  /65 (BP 1 Location: Left arm, BP Patient Position: Sitting)   Pulse 61   Temp 98.6 °F (37 °C) (Oral)   Resp 18   Ht 5' 1\" (1.549 m)   Wt 180 lb (81.6 kg)   LMP 01/01/1986   SpO2 96%   BMI 34.01 kg/m²        Physical Exam  Vitals signs and nursing note reviewed. Constitutional:       Appearance: Normal appearance. HENT:      Head: Normocephalic and atraumatic. Neck:      Musculoskeletal: Normal range of motion and neck supple. Cardiovascular:      Rate and Rhythm: Normal rate and regular rhythm. Pulses: Normal pulses. Heart sounds: Normal heart sounds. No murmur. No friction rub. No gallop. Pulmonary:      Effort: Pulmonary effort is normal.      Breath sounds: Normal breath sounds. Abdominal:      General: Abdomen is flat. Bowel sounds are normal.      Palpations: Abdomen is soft. Musculoskeletal:      Right lower leg: No edema. Left lower leg: No edema. Skin:     General: Skin is warm and dry. Neurological:      General: No focal deficit present. Mental Status: She is alert. Pertinent Labs/Studies:      Assessment and orders:       ICD-10-CM ICD-9-CM    1. Essential hypertension I10 401.9 lisinopriL (PRINIVIL, ZESTRIL) 10 mg tablet   2. Dizziness R42 780.4    3. Fall, initial encounter Naya Johnson. Buster Sheth M178.2      Diagnoses and all orders for this visit:    1. Essential hypertension: Refill of medications  -     lisinopriL (PRINIVIL, ZESTRIL) 10 mg tablet; Take 1 Tab by mouth daily. Take medication in the morning    2. Dizziness: Likely caused by Orthostatic Hypotension related to how taking medication. Advised to change when taking. 3. Fall, initial encounter: Requesting Records from ER. Changing medication taking. Follow-up and Dispositions    · Return if symptoms worsen or fail to improve. I have discussed the diagnosis with the patient and the intended plan as seen in the above orders. Social history, medical history, and labs were reviewed.   The patient has received an after-visit summary and questions were answered concerning future plans. I have discussed medication side effects and warnings with the patient as well.     MD KRIS Ramirez & NAT OHARA [facility]  06/12/20

## 2020-06-12 NOTE — PROGRESS NOTES
Chief Complaint   Patient presents with   [address] ER, fall     Visit Vitals  /65 (BP 1 Location: Left arm, BP Patient Position: Sitting)   Pulse 61   Temp 98.6 °F (37 °C) (Oral)   Resp 18   Ht 5' 1\" (1.549 m)   Wt 180 lb (81.6 kg)   LMP 01/01/1986   SpO2 96%   BMI 34.01 kg/m²     1. Have you been to the ER, urgent care clinic since your last visit? Hospitalized since your last visit? Yes [facility] ER    2. Have you seen or consulted any other health care providers outside of the [address] [place] [address] since your last visit? Include any pap smears or colon screening.  No    Reviewed record in preparation for visit and have necessary documentation  Pt did not bring medication to office visit for review  opportunity was given for questions  Goals that were addressed and/or need to be completed during or after this appointment include   Health Maintenance Due   Topic Date Due    Shingrix Vaccine Age 49> (1 of 2) 01/06/1984    Bone Densitometry (Dexa) Screening  01/06/1999    Pneumococcal 65+ years (2 of 2 - PPSV23) 07/22/2017    GLAUCOMA SCREENING Q2Y  04/05/2018    Medicare Yearly Exam  07/24/2019

## 2020-06-12 NOTE — PATIENT INSTRUCTIONS
- DO NOT TAKE LISINOPRIL TONIGHT!!! START LISINOPRIL TOMORROW MORNING 
 
Ronaldo Glass Affiliated with [address] [place] [address],  [address] [place], [facility] 
[phone] Log blood pressures at home while sitting, relaxed 3-5 times weekly and bring to next visit. Goal BP of 130/80 on average or lower. Call office as soon as possible if BP's over 140/90 or below 110/50 on multiple occasions and/or with symptoms of dizziness, chest pain, shortness of breath, headache or ankle swelling. Recheck Log and BP in office in [unfilled] Blood Pressure Record Patient Name:  ______________________ :  ______________________ Date/Time BP Reading Pulse

## 2020-06-26 ENCOUNTER — TELEPHONE (OUTPATIENT)
Dept: FAMILY MEDICINE CLINIC | Age: 85
End: 2020-06-26

## 2020-06-26 NOTE — TELEPHONE ENCOUNTER
Pt states that she has enough medication until Sunday. She needs for him to check her leg after Sunday to see if it is ok and whether or not she needs more medication. Wants to know if she can be seen next week?

## 2020-07-01 ENCOUNTER — VIRTUAL VISIT (OUTPATIENT)
Dept: FAMILY MEDICINE CLINIC | Age: 85
End: 2020-07-01

## 2020-07-01 DIAGNOSIS — I82.4Y1 ACUTE DEEP VEIN THROMBOSIS (DVT) OF PROXIMAL VEIN OF RIGHT LOWER EXTREMITY (HCC): Primary | ICD-10-CM

## 2020-07-01 NOTE — PROGRESS NOTES
Nir Thompson is a 80 y.o. female evaluated via Telephone on 20. Patient Identity confirmed by . Consent:  He and/or health care decision maker is aware that that he may receive a bill for this telephone service, depending on his insurance coverage, and has provided verbal consent to proceed: Yes    Physician Location: Office  Patient Location: Home  Nurse Assisting with Encounter: Nancy Weir LPN    Chief Complaint   Patient presents with    Follow-up     leg swelling      Information gathered from patient and/or health care decision maker. HPI:   DVT Right Leg: Patient noting improvement in the leg swelling. Leg is almost back to normal in terms of swelling. Completed to induction period of Xarelto and was on the once daily dosing. Denies pain, loss of sensation in foot, discoloration. Does report that sh has been out of Xarelto for the last 3 days though. Review of Systems   Constitutional: Negative for chills and fever. HENT: Negative for congestion. Respiratory: Negative for cough and shortness of breath. Cardiovascular: Positive for leg swelling. Negative for chest pain. Gastrointestinal: Negative for abdominal pain, nausea and vomiting. Current Outpatient Medications on File Prior to Visit   Medication Sig Dispense Refill    lisinopriL (PRINIVIL, ZESTRIL) 10 mg tablet Take 1 Tab by mouth daily. Take medication in the morning 90 Tab 1    sodium chloride (OCEAN) 0.65 % nasal squeeze bottle 0.05 mL by Both Nostrils route as needed for Congestion. 44 mL 4    cinnamon bark (CINNAMON) 500 mg cap Take  by mouth.  aspirin 81 mg chewable tablet Take 81 mg by mouth daily.  cholecalciferol, VITAMIN D3, (VITAMIN D3) 5,000 unit tab tablet Take  by mouth daily.  [DISCONTINUED] rivaroxaban (Xarelto) 15 mg (42)- 20 mg (9) DsPk Take one 15 mg tablet twice a day with food for the first 21 days. Then, take one 20 mg tablet once a day with food for 9 days.  1 Dose Pack 0     No current facility-administered medications on file prior to visit. No Known Allergies     Patient Active Problem List    Diagnosis Date Noted    Parapelvic renal cyst 10/30/2017    Actinic keratoses 08/04/2016    Obesity, Class II, BMI 35-39.9 02/18/2016    BMI 35.0-35.9,adult 02/18/2016    Sensorineural hearing loss of both ears 10/03/2014    Meningioma ([address] 02/08/2013    Tubular adenoma 10/02/2012    Melanosis coli 10/02/2012    Nephrolithiasis 09/27/2012    Cholelithiasis 09/27/2012    Allergic rhinitis 11/15/2010    HTN (hypertension) 11/15/2010        Health Maintenance Due   Topic Date Due    Shingrix Vaccine Age 50> (1 of 2) 01/06/1984    Bone Densitometry (Dexa) Screening  01/06/1999    Pneumococcal 65+ years (2 of 2 - PPSV23) 07/22/2017    GLAUCOMA SCREENING Q2Y  04/05/2018    Medicare Yearly Exam  07/24/2019        Assessment/Plan:  Details of this discussion including any medical advice provided: Restarting Xarelto now, patient advised to  today and restart. Advised will need to stay on Xarelto for at least another 3 months, up to 6 months total.    ICD-10-CM ICD-9-CM    1. Acute deep vein thrombosis (DVT) of proximal vein of right lower extremity (HCC) I82.4Y1 453.41 rivaroxaban (XARELTO) 20 mg tab tablet           Total Time: minutes: 5-10 minutes was spent on telemedicine encounter discussing above problems and plans. Patient Problem list, medications, and Allergies were reviewed during this encounter. Pursuant to the emergency declaration under the [facility] [address] waiver authority and the [employer] and [employer] Act, this Telephone Visit was conducted, with patient's consent, to reduce the patient's risk of exposure to COVID-19 and provide continuity of care for an established patient.      I affirm this is a Patient Initiated Episode with an Established Patient who has not had a related appointment within my department in the past 7 days or scheduled within the next 24 hours. Discussed diagnoses in detail with patient. Medication risks/benefits/side effects discussed with patient. All of the patient's questions were addressed. The patient understands and agrees with our plan of care. The patient knows to call back if they are unsure of or forget any changes we discussed today or if the symptoms change.     Note: not billable if this call serves to triage the patient into an appointment for the relevant concern    MD KRIS Gregory & NAT OHARA [facility]  07/01/20

## 2020-07-01 NOTE — PROGRESS NOTES
Chief Complaint   Patient presents with    Follow-up     leg swelling     1. Have you been to the ER, urgent care clinic since your last visit? Hospitalized since your last visit? No    2. Have you seen or consulted any other health care providers outside of the [address] [place] [address] since your last visit? Include any pap smears or colon screening.  No    Reviewed record in preparation for visit and have necessary documentation  Pt did not bring medication to office visit for review  opportunity was given for questions  Goals that were addressed and/or need to be completed during or after this appointment include   Health Maintenance Due   Topic Date Due    Shingrix Vaccine Age 49> (1 of 2) 01/06/1984    Bone Densitometry (Dexa) Screening  01/06/1999    Pneumococcal 65+ years (2 of 2 - PPSV23) 07/22/2017    GLAUCOMA SCREENING Q2Y  04/05/2018    Medicare Yearly Exam  07/24/2019

## 2020-08-19 ENCOUNTER — TELEPHONE (OUTPATIENT)
Dept: FAMILY MEDICINE CLINIC | Age: 85
End: 2020-08-19

## 2020-08-19 NOTE — TELEPHONE ENCOUNTER
Daughter states she believes her mom has wax build up in her ears and it is giving her trouble hearing. She is here with the Pt every Wednesday. Please call Pt she did not see how to do this by Phone Call.  Thanks

## 2020-08-26 ENCOUNTER — OFFICE VISIT (OUTPATIENT)
Dept: FAMILY MEDICINE CLINIC | Age: 85
End: 2020-08-26
Payer: MEDICARE

## 2020-08-26 VITALS
HEART RATE: 79 BPM | BODY MASS INDEX: 32.25 KG/M2 | DIASTOLIC BLOOD PRESSURE: 65 MMHG | WEIGHT: 170.8 LBS | HEIGHT: 61 IN | TEMPERATURE: 97.3 F | OXYGEN SATURATION: 95 % | RESPIRATION RATE: 18 BRPM | SYSTOLIC BLOOD PRESSURE: 111 MMHG

## 2020-08-26 DIAGNOSIS — H61.23 BILATERAL IMPACTED CERUMEN: Primary | ICD-10-CM

## 2020-08-26 PROCEDURE — 3288F FALL RISK ASSESSMENT DOCD: CPT | Performed by: FAMILY MEDICINE

## 2020-08-26 PROCEDURE — 1090F PRES/ABSN URINE INCON ASSESS: CPT | Performed by: FAMILY MEDICINE

## 2020-08-26 PROCEDURE — 99213 OFFICE O/P EST LOW 20 MIN: CPT | Performed by: FAMILY MEDICINE

## 2020-08-26 PROCEDURE — G8432 DEP SCR NOT DOC, RNG: HCPCS | Performed by: FAMILY MEDICINE

## 2020-08-26 PROCEDURE — 69210 REMOVE IMPACTED EAR WAX UNI: CPT | Performed by: FAMILY MEDICINE

## 2020-08-26 PROCEDURE — G8427 DOCREV CUR MEDS BY ELIG CLIN: HCPCS | Performed by: FAMILY MEDICINE

## 2020-08-26 PROCEDURE — G8417 CALC BMI ABV UP PARAM F/U: HCPCS | Performed by: FAMILY MEDICINE

## 2020-08-26 PROCEDURE — G8536 NO DOC ELDER MAL SCRN: HCPCS | Performed by: FAMILY MEDICINE

## 2020-08-26 PROCEDURE — 1100F PTFALLS ASSESS-DOCD GE2>/YR: CPT | Performed by: FAMILY MEDICINE

## 2020-08-26 NOTE — PROGRESS NOTES
Chief Complaint   Patient presents with    Wax in Ear     both, mostly right     Visit Vitals  Temp 97.3 °F (36.3 °C) (Temporal)   Resp 18   Ht 5' 1\" (1.549 m)   Wt 170 lb 12.8 oz (77.5 kg)   LMP 01/01/1986   SpO2 95%   BMI 32.27 kg/m²     1. Have you been to the ER, urgent care clinic since your last visit? Hospitalized since your last visit? No    2. Have you seen or consulted any other health care providers outside of the [address] [place] [address] since your last visit? Include any pap smears or colon screening.  No    Reviewed record in preparation for visit and have necessary documentation  Pt did not bring medication to office visit for review  opportunity was given for questions  Goals that were addressed and/or need to be completed during or after this appointment include   Health Maintenance Due   Topic Date Due    Shingrix Vaccine Age 49> (1 of 2) 01/06/1984    Bone Densitometry (Dexa) Screening  01/06/1999    Pneumococcal 65+ years (2 of 2 - PPSV23) 07/22/2017    GLAUCOMA SCREENING Q2Y  04/05/2018    Medicare Yearly Exam  07/24/2019

## 2020-08-26 NOTE — PROGRESS NOTES
[facility]    History of Present Illness:   Apolonia Crandall is a 80 y.o. female with history of HTN, Tubular adenoma, MID  CC: Hearing Loss  History provided by patient and Records    HPI:  Patient with bilateral hearing decrease, particularly in the right. Has no history of impacted cerumen. Patient denies pain, discharge, fevers, or chills. Health Maintenance  Health Maintenance Due   Topic Date Due    Shingrix Vaccine Age 49> (1 of 2) 01/06/1984    Bone Densitometry (Dexa) Screening  01/06/1999    Pneumococcal 65+ years (2 of 2 - PPSV23) 07/22/2017    GLAUCOMA SCREENING Q2Y  04/05/2018    Medicare Yearly Exam  07/24/2019       Past Medical, Family, and Social History:     Current Outpatient Medications on File Prior to Visit   Medication Sig Dispense Refill    rivaroxaban (XARELTO) 20 mg tab tablet Take 1 Tab by mouth daily. 90 Tab 1    lisinopriL (PRINIVIL, ZESTRIL) 10 mg tablet Take 1 Tab by mouth daily. Take medication in the morning 90 Tab 1    cinnamon bark (CINNAMON) 500 mg cap Take  by mouth.  aspirin 81 mg chewable tablet Take 81 mg by mouth daily.  cholecalciferol, VITAMIN D3, (VITAMIN D3) 5,000 unit tab tablet Take  by mouth daily.  sodium chloride (OCEAN) 0.65 % nasal squeeze bottle 0.05 mL by Both Nostrils route as needed for Congestion. 44 mL 4     No current facility-administered medications on file prior to visit.         Patient Active Problem List   Diagnosis Code    Allergic rhinitis J30.9    HTN (hypertension) I10    Nephrolithiasis N20.0    Cholelithiasis K80.20    Tubular adenoma D36.9    Melanosis coli K63.89    Meningioma ([address] D32.9    Sensorineural hearing loss of both ears H90.3    Obesity, Class II, BMI 35-39.9 E66.9    BMI 35.0-35.9,adult Z68.35    Actinic keratoses L57.0    Parapelvic renal cyst N28.1       Social History     Socioeconomic History    Marital status:      Spouse name: Not on file    Number of children: Not on file    Years of education: Not on file    Highest education level: Not on file   Occupational History    Not on file   Social Needs    Financial resource strain: Not on file    Food insecurity     Worry: Not on file     Inability: Not on file    Transportation needs     Medical: Not on file     Non-medical: Not on file   Tobacco Use    Smoking status: Never Smoker    Smokeless tobacco: Never Used   Substance and Sexual Activity    Alcohol use: No    Drug use: No    Sexual activity: Not on file   Lifestyle    Physical activity     Days per week: Not on file     Minutes per session: Not on file    Stress: Not on file   Relationships    Social connections     Talks on phone: Not on file     Gets together: Not on file     Attends Spiritism service: Not on file     Active member of club or organization: Not on file     Attends meetings of clubs or organizations: Not on file     Relationship status: Not on file    Intimate partner violence     Fear of current or ex partner: Not on file     Emotionally abused: Not on file     Physically abused: Not on file     Forced sexual activity: Not on file   Other Topics Concern    Not on file   Social History Narrative    Not on file       Review of Systems   Review of Systems   Constitutional: Negative for chills and fever. HENT: Positive for hearing loss. Negative for congestion, ear discharge, ear pain and tinnitus. Respiratory: Negative for cough. Cardiovascular: Negative for chest pain and palpitations. Gastrointestinal: Negative for abdominal pain, nausea and vomiting. Neurological: Negative for dizziness. Objective:     Visit Vitals  /65 (BP 1 Location: Right arm, BP Patient Position: Sitting)   Pulse 79   Temp 97.3 °F (36.3 °C) (Temporal)   Resp 18   Ht 5' 1\" (1.549 m)   Wt 170 lb 12.8 oz (77.5 kg)   LMP 01/01/1986   SpO2 95%   BMI 32.27 kg/m²        Physical Exam  Vitals signs and nursing note reviewed. Constitutional:       Appearance: Normal appearance. HENT:      Head: Normocephalic and atraumatic. Right Ear: Ear canal and external ear normal. There is impacted cerumen. Left Ear: Ear canal and external ear normal. There is impacted cerumen. Neck:      Musculoskeletal: Normal range of motion and neck supple. Cardiovascular:      Rate and Rhythm: Normal rate and regular rhythm. Pulses: Normal pulses. Heart sounds: Normal heart sounds. Pulmonary:      Effort: Pulmonary effort is normal.      Breath sounds: Normal breath sounds. Abdominal:      General: Abdomen is flat. Bowel sounds are normal.      Palpations: Abdomen is soft. Neurological:      Mental Status: She is alert. Pertinent Labs/Studies:      Assessment and orders:       ICD-10-CM ICD-9-CM    1. Bilateral impacted cerumen  H61.23 380.4 carbamide peroxide (DEBROX) 6.5 % otic solution      REMOVE IMPACTED EAR WAX     Diagnoses and all orders for this visit:    1. Bilateral impacted cerumen: Cerumen removed mechanically and flushed as well. Debrox and hydrogen peroxide washes at home. -     carbamide peroxide (DEBROX) 6.5 % otic solution; Administer 5 Drops into each ear two (2) times a day. -     REMOVE IMPACTED EAR WAX      Follow-up and Dispositions    · Return if symptoms worsen or fail to improve. I have discussed the diagnosis with the patient and the intended plan as seen in the above orders. Social history, medical history, and labs were reviewed. The patient has received an after-visit summary and questions were answered concerning future plans. I have discussed medication side effects and warnings with the patient as well.     Chasidy Diss, MD KRIS VALDIVIA & NAT OHARA [facility]  08/26/20

## 2020-12-09 ENCOUNTER — VIRTUAL VISIT (OUTPATIENT)
Dept: FAMILY MEDICINE CLINIC | Age: 85
End: 2020-12-09
Payer: MEDICARE

## 2020-12-09 DIAGNOSIS — R42 ORTHOSTATIC DIZZINESS: ICD-10-CM

## 2020-12-09 DIAGNOSIS — J30.9 ALLERGIC RHINITIS, UNSPECIFIED SEASONALITY, UNSPECIFIED TRIGGER: ICD-10-CM

## 2020-12-09 DIAGNOSIS — I10 ESSENTIAL HYPERTENSION: Primary | ICD-10-CM

## 2020-12-09 PROCEDURE — 99442 PR PHYS/QHP TELEPHONE EVALUATION 11-20 MIN: CPT | Performed by: FAMILY MEDICINE

## 2020-12-09 RX ORDER — LISINOPRIL 10 MG/1
10 TABLET ORAL DAILY
Qty: 90 TAB | Refills: 1 | Status: SHIPPED | OUTPATIENT
Start: 2020-12-09 | End: 2021-08-18

## 2020-12-09 NOTE — PROGRESS NOTES
Amanda Wilkinson is a 80 y.o. female evaluated via Telephone on 20. Patient Identity confirmed by . Consent:  He and/or health care decision maker is aware that that he may receive a bill for this telephone service, depending on his insurance coverage, and has provided verbal consent to proceed: Yes    Physician Location: Office  Patient Location: Home  Nurse Assisting with Encounter: Dejuan Padron LPN    Chief Complaint   Patient presents with    Medication Refill      Information gathered from patient and/or health care decision maker. HPI:   Hypertension Follow up:  Currently Taking Lisinopril 10 mg. The patient reports:  taking medications as instructed, no medication side effects noted, no TIA's, no chest pain on exertion, no dyspnea on exertion, no swelling of ankles. BP Readings from Last 3 Encounters:   20 111/65   20 110/65   20 118/50     Patient reports that after she eats she gets congestion and has to blow her nose. Reports that she does not get stuffy at other times. Not on any anti-histamine at this time. Review of Systems   HENT: Negative for congestion. Respiratory: Negative for cough and shortness of breath. Cardiovascular: Negative for chest pain and palpitations. Neurological: Positive for dizziness. Limited Exam:  Due to this being a TeleHealth evaluation, many elements of the physical examination are unable to be assessed. Constitutional: Appears well-developed and well-nourished in no apparent distress   Mental status: Alert and awake, Oriented to person/place/time, Able to follow commands  Psychiatric: Normal affect, normal judgment/insight. No hallucinations     Current Outpatient Medications on File Prior to Visit   Medication Sig Dispense Refill    sodium chloride (OCEAN) 0.65 % nasal squeeze bottle 0.05 mL by Both Nostrils route as needed for Congestion. 44 mL 4    cinnamon bark (CINNAMON) 500 mg cap Take  by mouth.       aspirin 81 mg chewable tablet Take 81 mg by mouth daily.  carbamide peroxide (DEBROX) 6.5 % otic solution Administer 5 Drops into each ear two (2) times a day. 30 mL 0    rivaroxaban (XARELTO) 20 mg tab tablet Take 1 Tab by mouth daily. 90 Tab 1    [DISCONTINUED] lisinopriL (PRINIVIL, ZESTRIL) 10 mg tablet Take 1 Tab by mouth daily. Take medication in the morning 90 Tab 1    cholecalciferol, VITAMIN D3, (VITAMIN D3) 5,000 unit tab tablet Take  by mouth daily. No current facility-administered medications on file prior to visit. No Known Allergies     Patient Active Problem List    Diagnosis Date Noted    Parapelvic renal cyst 10/30/2017    Actinic keratoses 08/04/2016    Obesity, Class II, BMI 35-39.9 02/18/2016    BMI 35.0-35.9,adult 02/18/2016    Sensorineural hearing loss of both ears 10/03/2014    Meningioma ([address] 02/08/2013    Tubular adenoma 10/02/2012    Melanosis coli 10/02/2012    Nephrolithiasis 09/27/2012    Cholelithiasis 09/27/2012    Allergic rhinitis 11/15/2010    HTN (hypertension) 11/15/2010        Health Maintenance Due   Topic Date Due    Shingrix Vaccine Age 50> (1 of 2) 01/06/1984    Bone Densitometry (Dexa) Screening  01/06/1999    Pneumococcal 65+ years (2 of 2 - PPSV23) 07/22/2017    GLAUCOMA SCREENING Q2Y  04/05/2018    Medicare Yearly Exam  07/24/2019    Flu Vaccine (1) 09/01/2020        Assessment/Plan:  Details of this discussion including any medical advice provided: Reducing Lisinopril to 5 mg, daily for now, hopefully improve Orthostatic Hypotension. ICD-10-CM ICD-9-CM    1. Essential hypertension  I10 401.9 lisinopriL (PRINIVIL, ZESTRIL) 10 mg tablet   2. Allergic rhinitis, unspecified seasonality, unspecified trigger  J30.9 477.9    3. Orthostatic dizziness  R42 780.4      Follow-up and Dispositions    · Return for Follow up [address]            Total Time: minutes: 11-20 minutes was spent on telemedicine encounter discussing above problems and plans. Patient Problem list, medications, and Allergies were reviewed during this encounter. Pursuant to the emergency declaration under the [facility], [facility] waiver authority and the [employer] Act, this Telephone Visit was conducted, with patient's consent, to reduce the patient's risk of exposure to COVID-19 and provide continuity of care for an established patient. I affirm this is a Patient Initiated Episode with an Established Patient who has not had a related appointment within my department in the past 7 days or scheduled within the next 24 hours. Discussed diagnoses in detail with patient. Medication risks/benefits/side effects discussed with patient. All of the patient's questions were addressed. The patient understands and agrees with our plan of care. The patient knows to call back if they are unsure of or forget any changes we discussed today or if the symptoms change.     Note: not billable if this call serves to triage the patient into an appointment for the relevant concern    MD KRIS Duggan & NAT OHARA [facility]  12/09/20

## 2020-12-09 NOTE — PROGRESS NOTES
Chief Complaint   Patient presents with    Medication Refill     1. Have you been to the ER, urgent care clinic since your last visit? Hospitalized since your last visit? No    2. Have you seen or consulted any other health care providers outside of the [address] [place] [address] since your last visit? Include any pap smears or colon screening.  No    Reviewed record in preparation for visit and have necessary documentation  Pt did not bring medication to office visit for review  opportunity was given for questions  Goals that were addressed and/or need to be completed during or after this appointment include   Health Maintenance Due   Topic Date Due    Shingrix Vaccine Age 49> (1 of 2) 01/06/1984    Bone Densitometry (Dexa) Screening  01/06/1999    Pneumococcal 65+ years (2 of 2 - PPSV23) 07/22/2017    GLAUCOMA SCREENING Q2Y  04/05/2018    Medicare Yearly Exam  07/24/2019    Flu Vaccine (1) 09/01/2020

## 2020-12-15 ENCOUNTER — TELEPHONE (OUTPATIENT)
Dept: FAMILY MEDICINE CLINIC | Age: 85
End: 2020-12-15

## 2020-12-15 NOTE — TELEPHONE ENCOUNTER
BP: 12/9  126/69         12/10  /73                    /60          12/11  /60                     /67           12/12  /67                      PM  134/69            12/13  /64                       PM  139/82            12/14  /69                       PM  135/63             12/15  /73

## 2020-12-30 ENCOUNTER — VIRTUAL VISIT (OUTPATIENT)
Dept: FAMILY MEDICINE CLINIC | Age: 85
End: 2020-12-30
Payer: MEDICARE

## 2020-12-30 DIAGNOSIS — I10 ESSENTIAL HYPERTENSION: ICD-10-CM

## 2020-12-30 DIAGNOSIS — R42 ORTHOSTATIC DIZZINESS: Primary | ICD-10-CM

## 2020-12-30 PROCEDURE — 99442 PR PHYS/QHP TELEPHONE EVALUATION 11-20 MIN: CPT | Performed by: FAMILY MEDICINE

## 2020-12-30 NOTE — PROGRESS NOTES
Chief Complaint   Patient presents with    Leg Pain     both    Blood Pressure Check     1. Have you been to the ER, urgent care clinic since your last visit? Hospitalized since your last visit? No    2. Have you seen or consulted any other health care providers outside of the [address] [place] [address] since your last visit? Include any pap smears or colon screening.  No    Reviewed record in preparation for visit and have necessary documentation  Pt did not bring medication to office visit for review  opportunity was given for questions  Goals that were addressed and/or need to be completed during or after this appointment include   Health Maintenance Due   Topic Date Due    Shingrix Vaccine Age 49> (1 of 2) 01/06/1984    Bone Densitometry (Dexa) Screening  01/06/1999    Pneumococcal 65+ years (2 of 2 - PPSV23) 07/22/2017    GLAUCOMA SCREENING Q2Y  04/05/2018    Medicare Yearly Exam  07/24/2019    Flu Vaccine (1) 09/01/2020

## 2020-12-30 NOTE — PROGRESS NOTES
Weston Mota is a 80 y.o. female evaluated via Telephone on 20. Patient Identity confirmed by . Consent:  He and/or health care decision maker is aware that that he may receive a bill for this telephone service, depending on his insurance coverage, and has provided verbal consent to proceed: Yes    Physician Location: Office  Patient Location: Home  Nurse Assisting with Encounter: Angelica Gutiérrez LPN    Chief Complaint   Patient presents with    Leg Pain     both    Blood Pressure Check      Information gathered from patient and/or health care decision maker. HPI:   Hypertension Follow up:  Currently Taking Lisinopril 5 mg. The patient reports:  taking medications as instructed, no medication side effects noted, home BP monitoring in range of 089'F systolic over 97'L diastolic, no TIA's, no chest pain on exertion, no dyspnea on exertion, no swelling of ankles. Does report Orthostatic Dizziness that occurs with going from sitting standing. Most often in the morning. BP Readings from Last 3 Encounters:   20 111/65   20 110/65   20 118/50     Patient reports some issues with pain in the knees and legs after she falls. Notes rubbing certain medications into legs. Review of Systems   Constitutional: Negative for chills and fever. Respiratory: Negative for cough. Genitourinary: Negative for dysuria. Musculoskeletal: Negative for myalgias. Neurological: Positive for dizziness. Limited Exam:  Due to this being a TeleHealth evaluation, many elements of the physical examination are unable to be assessed. Constitutional: Appears well-developed and well-nourished in no apparent distress   Mental status: Alert and awake, Oriented to person/place/time, Able to follow commands  Psychiatric: Normal affect, normal judgment/insight.  No hallucinations     Current Outpatient Medications on File Prior to Visit   Medication Sig Dispense Refill    lisinopriL (PRINIVIL, ZESTRIL) 10 mg tablet Take 1 Tab by mouth daily. Take medication in the morning (Patient taking differently: Take 5 mg by mouth daily. Take medication in the morning) 90 Tab 1    sodium chloride (OCEAN) 0.65 % nasal squeeze bottle 0.05 mL by Both Nostrils route as needed for Congestion. 44 mL 4    cinnamon bark (CINNAMON) 500 mg cap Take  by mouth.  aspirin 81 mg chewable tablet Take 81 mg by mouth daily.  cholecalciferol, VITAMIN D3, (VITAMIN D3) 5,000 unit tab tablet Take  by mouth daily.  carbamide peroxide (DEBROX) 6.5 % otic solution Administer 5 Drops into each ear two (2) times a day. 30 mL 0    rivaroxaban (XARELTO) 20 mg tab tablet Take 1 Tab by mouth daily. 90 Tab 1     No current facility-administered medications on file prior to visit. No Known Allergies     Patient Active Problem List    Diagnosis Date Noted    Parapelvic renal cyst 10/30/2017    Actinic keratoses 08/04/2016    Obesity, Class II, BMI 35-39.9 02/18/2016    BMI 35.0-35.9,adult 02/18/2016    Sensorineural hearing loss of both ears 10/03/2014    Meningioma ([address] 02/08/2013    Tubular adenoma 10/02/2012    Melanosis coli 10/02/2012    Nephrolithiasis 09/27/2012    Cholelithiasis 09/27/2012    Allergic rhinitis 11/15/2010    HTN (hypertension) 11/15/2010        Health Maintenance Due   Topic Date Due    Shingrix Vaccine Age 50> (1 of 2) 01/06/1984    Bone Densitometry (Dexa) Screening  01/06/1999    Pneumococcal 65+ years (2 of 2 - PPSV23) 07/22/2017    GLAUCOMA SCREENING Q2Y  04/05/2018    Medicare Yearly Exam  07/24/2019    Flu Vaccine (1) 09/01/2020        Assessment/Plan:  Details of this discussion including any medical advice provided: Stop the Lisinopril today and follow up 2 weeks for Orthostatics and BP cuff comparison. ICD-10-CM ICD-9-CM    1. Orthostatic dizziness  R42 780.4    2.  Essential hypertension  I10 401.9      Follow-up and Dispositions    · Return in about 2 weeks (around 1/13/2021) for In office follow up for BP and Orthostatics. Total Time: minutes: 11-20 minutes was spent on telemedicine encounter discussing above problems and plans. Patient Problem list, medications, and Allergies were reviewed during this encounter. Pursuant to the emergency declaration under the [address] [place] [address] waiver authority and the [employer] Act, this Telephone Visit was conducted, with patient's consent, to reduce the patient's risk of exposure to COVID-19 and provide continuity of care for an established patient. I affirm this is a Patient Initiated Episode with an Established Patient who has not had a related appointment within my department in the past 7 days or scheduled within the next 24 hours. Discussed diagnoses in detail with patient. Medication risks/benefits/side effects discussed with patient. All of the patient's questions were addressed. The patient understands and agrees with our plan of care. The patient knows to call back if they are unsure of or forget any changes we discussed today or if the symptoms change.     Note: not billable if this call serves to triage the patient into an appointment for the relevant concern    Alcide Goodpasture, MD PRISCILLA CHAN & NAT OHARA [facility]  12/30/20

## 2021-01-11 ENCOUNTER — TELEPHONE (OUTPATIENT)
Dept: FAMILY MEDICINE CLINIC | Age: 86
End: 2021-01-11

## 2021-01-11 NOTE — TELEPHONE ENCOUNTER
Called to schedule VV because office is currently only doing VV until Feb. No answer. Left detailed message to call back.

## 2021-01-11 NOTE — TELEPHONE ENCOUNTER
----- Message from Marian Tejeda sent at 1/11/2021  2:13 PM EST -----  Regarding: No Appointment Available  Contact: [phone]  Appointment not available    Caller's first and last name and relationship to patient (if not the patient): mich Delgadillo      Best contact number: [phone]      Preferred date and time: Wednesdays after 1 PM, preferred on 01/20/21. Scheduled appointment date and time: Not Currently scheduled. Reason for appointment: F/up on medication, falls and dizziness when standing. Details to clarify the request: Patient advising MD Chilo Steen requesting patient decrease taking medication and make in office appointment. Patient prefers appointment on 01/20/21 after 1 PM if possible, if not, please arrange with patient daughter at [phone].       Marian Tejeda

## 2021-01-14 ENCOUNTER — VIRTUAL VISIT (OUTPATIENT)
Dept: FAMILY MEDICINE CLINIC | Age: 86
End: 2021-01-14
Payer: MEDICARE

## 2021-01-14 DIAGNOSIS — R29.6 FALLS FREQUENTLY: ICD-10-CM

## 2021-01-14 DIAGNOSIS — I10 ESSENTIAL HYPERTENSION: Primary | ICD-10-CM

## 2021-01-14 DIAGNOSIS — I95.1 ORTHOSTATIC HYPOTENSION: ICD-10-CM

## 2021-01-14 PROCEDURE — 99441 PR PHYS/QHP TELEPHONE EVALUATION 5-10 MIN: CPT | Performed by: FAMILY MEDICINE

## 2021-01-14 NOTE — PROGRESS NOTES
Chief Complaint   Patient presents with    Medication Evaluation     1. Have you been to the ER, urgent care clinic since your last visit? Hospitalized since your last visit? No    2. Have you seen or consulted any other health care providers outside of the [address] [place] [address] since your last visit? Include any pap smears or colon screening.  No    Reviewed record in preparation for visit and have necessary documentation  Pt did not bring medication to office visit for review  opportunity was given for questions  Goals that were addressed and/or need to be completed during or after this appointment include   Health Maintenance Due   Topic Date Due    Shingrix Vaccine Age 49> (1 of 2) 01/06/1984    Bone Densitometry (Dexa) Screening  01/06/1999    Pneumococcal 65+ years (2 of 2 - PPSV23) 07/22/2017    GLAUCOMA SCREENING Q2Y  04/05/2018    Medicare Yearly Exam  07/24/2019    Flu Vaccine (1) 09/01/2020

## 2021-01-14 NOTE — PROGRESS NOTES
Ady López is a 80 y.o. female evaluated via Telephone on 21. Patient Identity confirmed by . Consent:  He and/or health care decision maker is aware that that he may receive a bill for this telephone service, depending on his insurance coverage, and has provided verbal consent to proceed: Yes    Physician Location: Office  Patient Location: Home  Nurse Assisting with Encounter: Stacy Aschoff, LPN    Chief Complaint   Patient presents with    Medication Evaluation      Information gathered from patient and/or health care decision maker. HPI:   Dizziness: Patient reports that she has been having dizziness with going from sitting to standing and may have been worsened with stopping Lisinopril. Patient reports still having issues with falling, primarily in the morning after waking. Patient states she will get dizzy and then fall and will have to get son to help her get up. Patient uses walker, but when in bathroom/Kitchen without walker she has had falls. Patient has had a cane, but does not use at all currently. Review of Systems   Constitutional: Negative for chills and fever. HENT: Negative for congestion. Cardiovascular: Negative for chest pain and palpitations. Gastrointestinal: Negative for nausea and vomiting. Musculoskeletal: Negative for myalgias. Neurological: Positive for dizziness. Limited Exam:  Due to this being a TeleHealth evaluation, many elements of the physical examination are unable to be assessed. Constitutional: Appears well-developed and well-nourished in no apparent distress   Mental status: Alert and awake, Oriented to person/place/time, Able to follow commands  Psychiatric: Normal affect, normal judgment/insight. No hallucinations     Current Outpatient Medications on File Prior to Visit   Medication Sig Dispense Refill    rivaroxaban (XARELTO) 20 mg tab tablet Take 1 Tab by mouth daily.  90 Tab 1    sodium chloride (OCEAN) 0.65 % nasal squeeze bottle 0.05 mL by Both Nostrils route as needed for Congestion. 44 mL 4    cinnamon bark (CINNAMON) 500 mg cap Take  by mouth.  aspirin 81 mg chewable tablet Take 81 mg by mouth daily.  cholecalciferol, VITAMIN D3, (VITAMIN D3) 5,000 unit tab tablet Take  by mouth daily.  lisinopriL (PRINIVIL, ZESTRIL) 10 mg tablet Take 1 Tab by mouth daily. Take medication in the morning (Patient taking differently: Take 5 mg by mouth daily. Take medication in the morning) 90 Tab 1    carbamide peroxide (DEBROX) 6.5 % otic solution Administer 5 Drops into each ear two (2) times a day. 30 mL 0     No current facility-administered medications on file prior to visit. No Known Allergies     Patient Active Problem List    Diagnosis Date Noted    Parapelvic renal cyst 10/30/2017    Actinic keratoses 08/04/2016    Obesity, Class II, BMI 35-39.9 02/18/2016    BMI 35.0-35.9,adult 02/18/2016    Sensorineural hearing loss of both ears 10/03/2014    Meningioma ([address] 02/08/2013    Tubular adenoma 10/02/2012    Melanosis coli 10/02/2012    Nephrolithiasis 09/27/2012    Cholelithiasis 09/27/2012    Allergic rhinitis 11/15/2010    HTN (hypertension) 11/15/2010        Health Maintenance Due   Topic Date Due    Shingrix Vaccine Age 50> (1 of 2) 01/06/1984    Bone Densitometry (Dexa) Screening  01/06/1999    Pneumococcal 65+ years (2 of 2 - PPSV23) 07/22/2017    GLAUCOMA SCREENING Q2Y  04/05/2018    Medicare Yearly Exam  07/24/2019    Flu Vaccine (1) 09/01/2020        Assessment/Plan:  Details of this discussion including any medical advice provided: Patient is at risk for falls and injury. Will speak to family about setting up safety systems for falls. Appears related to balance given timing of falls. ICD-10-CM ICD-9-CM    1. Essential hypertension  I10 401.9    2. Orthostatic hypotension  I95.1 458.0    3.  Falls frequently  R29.6 V15.88      Follow-up and Dispositions    · Return in about 2 weeks (around 1/28/2021) for In office for BP checking. Total Time: minutes: 5-10 minutes was spent on telemedicine encounter discussing above problems and plans. Patient Problem list, medications, and Allergies were reviewed during this encounter. Pursuant to the emergency declaration under the [address] [place] [address], [facility] waiver authority and the [employer] Act, this Telephone Visit was conducted, with patient's consent, to reduce the patient's risk of exposure to COVID-19 and provide continuity of care for an established patient. I affirm this is a Patient Initiated Episode with an Established Patient who has not had a related appointment within my department in the past 7 days or scheduled within the next 24 hours. Discussed diagnoses in detail with patient. Medication risks/benefits/side effects discussed with patient. All of the patient's questions were addressed. The patient understands and agrees with our plan of care. The patient knows to call back if they are unsure of or forget any changes we discussed today or if the symptoms change.     Note: not billable if this call serves to triage the patient into an appointment for the relevant concern    MD KRIS Ramirez & NAT OHARA [facility]  01/14/21

## 2021-05-19 ENCOUNTER — VIRTUAL VISIT (OUTPATIENT)
Dept: FAMILY MEDICINE CLINIC | Age: 86
End: 2021-05-19
Payer: MEDICARE

## 2021-05-19 DIAGNOSIS — R53.81 DEBILITY: ICD-10-CM

## 2021-05-19 DIAGNOSIS — R53.82 CHRONIC FATIGUE: ICD-10-CM

## 2021-05-19 DIAGNOSIS — E55.9 VITAMIN D DEFICIENCY: ICD-10-CM
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
## 2021-05-19 PROCEDURE — 1100F PTFALLS ASSESS-DOCD GE2>/YR: CPT | Performed by: FAMILY MEDICINE

## 2021-05-19 PROCEDURE — G8417 CALC BMI ABV UP PARAM F/U: HCPCS | Performed by: FAMILY MEDICINE

## 2021-05-19 PROCEDURE — G8432 DEP SCR NOT DOC, RNG: HCPCS | Performed by: FAMILY MEDICINE

## 2021-05-19 PROCEDURE — G8427 DOCREV CUR MEDS BY ELIG CLIN: HCPCS | Performed by: FAMILY MEDICINE

## 2021-05-19 PROCEDURE — 1090F PRES/ABSN URINE INCON ASSESS: CPT | Performed by: FAMILY MEDICINE

## 2021-05-19 PROCEDURE — G8536 NO DOC ELDER MAL SCRN: HCPCS | Performed by: FAMILY MEDICINE

## 2021-05-19 PROCEDURE — 99213 OFFICE O/P EST LOW 20 MIN: CPT | Performed by: FAMILY MEDICINE

## 2021-05-19 PROCEDURE — 3288F FALL RISK ASSESSMENT DOCD: CPT | Performed by: FAMILY MEDICINE

## 2021-05-19 NOTE — PROGRESS NOTES
Roxanne Galan is a 80 y.o. female evaluated via Telephone on 21. Patient Identity confirmed by . Consent:  He and/or health care decision maker is aware that that he may receive a bill for this telephone service, depending on his insurance coverage, and has provided verbal consent to proceed: Yes    Physician Location: Office  Patient Location: Home  Nurse Assisting with Encounter: Theodore Cuevas LPN    Chief Complaint   Patient presents with    Fall     s/p yesterday, [address] [facility]       Information gathered from patient and/or health care decision maker. HPI:   Patient has had several falls recently. Per patient she will become light headed while standing and walking around with walker. Has had falls with use of walker as well. Patient also reports feeling some weakness in her legs. Noting patient has not been moving much recently. Had been getting PT last year for similar issues. Review of Systems   Constitutional: Negative for chills and fever. HENT: Negative for congestion. Respiratory: Negative for cough. Cardiovascular: Negative for chest pain and palpitations. Neurological: Positive for weakness. Limited Exam:  Due to this being a TeleHealth evaluation, many elements of the physical examination are unable to be assessed. Constitutional: Appears well-developed and well-nourished in no apparent distress   Mental status: Alert and awake, Oriented to person/place/time, Able to follow commands  Psychiatric: Normal affect, normal judgment/insight. No hallucinations     Current Outpatient Medications on File Prior to Visit   Medication Sig Dispense Refill    lisinopriL (PRINIVIL, ZESTRIL) 10 mg tablet Take 1 Tab by mouth daily. Take medication in the morning (Patient taking differently: Take 5 mg by mouth daily. Take medication in the morning) 90 Tab 1    carbamide peroxide (DEBROX) 6.5 % otic solution Administer 5 Drops into each ear two (2) times a day.  (Patient taking differently: Administer 5 Drops into each ear as needed.) 30 mL 0    sodium chloride (OCEAN) 0.65 % nasal squeeze bottle 0.05 mL by Both Nostrils route as needed for Congestion. 44 mL 4    cinnamon bark (CINNAMON) 500 mg cap Take  by mouth.  aspirin 81 mg chewable tablet Take 81 mg by mouth daily.  cholecalciferol, VITAMIN D3, (VITAMIN D3) 5,000 unit tab tablet Take  by mouth daily.  rivaroxaban (XARELTO) 20 mg tab tablet Take 1 Tab by mouth daily. (Patient not taking: Reported on 5/19/2021) 90 Tab 1     No current facility-administered medications on file prior to visit. No Known Allergies     Patient Active Problem List    Diagnosis Date Noted    Parapelvic renal cyst 10/30/2017    Actinic keratoses 08/04/2016    Obesity, Class II, BMI 35-39.9 02/18/2016    BMI 35.0-35.9,adult 02/18/2016    Sensorineural hearing loss of both ears 10/03/2014    Meningioma ([address] 02/08/2013    Tubular adenoma 10/02/2012    Melanosis coli 10/02/2012    Nephrolithiasis 09/27/2012    Cholelithiasis 09/27/2012    Allergic rhinitis 11/15/2010    HTN (hypertension) 11/15/2010        Health Maintenance Due   Topic Date Due    COVID-19 Vaccine (1) Never done    Shingrix Vaccine Age 50> (1 of 2) Never done    Bone Densitometry (Dexa) Screening  Never done    Pneumococcal 65+ years (2 of 2 - PPSV23) 07/22/2017    Medicare Yearly Exam  07/24/2019        Assessment/Plan:  Details of this discussion including any medical advice provided: Restarting PT at home, and getting labs to rule out metabolic cause as well. ICD-10-CM ICD-9-CM    1. Fall, initial encounter  [address]. Salvatore Isabel M477.1 METABOLIC PANEL, COMPREHENSIVE      CBC W/O DIFF      REFERRAL TO HOME HEALTH      CANCELED: [facility]   2. Chronic fatigue  R53.82 780.79 TSH 3RD GENERATION   3.  Vitamin D deficiency  E55.9 268.9 VITAMIN D, 25 HYDROXY   4. Debility  R53.81 799.3 REFERRAL TO HOME HEALTH      CANCELED: [address] [facility] Follow-up and Dispositions    · Return in about 4 weeks (around 6/16/2021) for Follow up MWE. Total Time: minutes: 11-20 minutes was spent on telemedicine encounter discussing above problems and plans. Patient Problem list, medications, and Allergies were reviewed during this encounter. Pursuant to the emergency declaration under the [address] [place] [address] [facility] authority and the [employer] and [employer] Act, this Telephone Visit was conducted, with patient's consent, to reduce the patient's risk of exposure to COVID-19 and provide continuity of care for an established patient. I affirm this is a Patient Initiated Episode with an Established Patient who has not had a related appointment within my department in the past 7 days or scheduled within the next 24 hours. Discussed diagnoses in detail with patient. Medication risks/benefits/side effects discussed with patient. All of the patient's questions were addressed. The patient understands and agrees with our plan of care. The patient knows to call back if they are unsure of or forget any changes we discussed today or if the symptoms change.     Note: not billable if this call serves to triage the patient into an appointment for the relevant concern    MD KRIS Calero & NAT OHARA [facility]  05/19/21

## 2021-05-19 NOTE — PROGRESS NOTES
Chief Complaint   Patient presents with    Fall     s/p yesterday, [facility]      1. Have you been to the ER, urgent care clinic since your last visit? Hospitalized since your last visit? Yes [facility]    2. Have you seen or consulted any other health care providers outside of the [address] [place] [address] since your last visit? Include any pap smears or colon screening.  No    Reviewed record in preparation for visit and have necessary documentation  Pt did not bring medication to office visit for review  opportunity was given for questions  Goals that were addressed and/or need to be completed during or after this appointment include   Health Maintenance Due   Topic Date Due    COVID-19 Vaccine (1) Never done    Shingrix Vaccine Age 50> (1 of 2) Never done    Bone Densitometry (Dexa) Screening  Never done    Pneumococcal 65+ years (2 of 2 - PPSV23) 07/22/2017    Medicare Yearly Exam  07/24/2019

## 2021-05-20 ENCOUNTER — TELEPHONE (OUTPATIENT)
Dept: FAMILY MEDICINE CLINIC | Age: 86
End: 2021-05-20

## 2021-05-20 LAB
25(OH)D3 SERPL-MCNC: 100.7 NG/ML (ref 30–100)
ALBUMIN SERPL-MCNC: 3.3 G/DL (ref 3.5–5)
ALBUMIN/GLOB SERPL: 1 {RATIO} (ref 1.1–2.2)
ALP SERPL-CCNC: 67 U/L (ref 45–117)
ALT SERPL-CCNC: 19 U/L (ref 12–78)
ANION GAP SERPL CALC-SCNC: 7 MMOL/L (ref 5–15)
AST SERPL-CCNC: 14 U/L (ref 15–37)
BILIRUB SERPL-MCNC: 0.4 MG/DL (ref 0.2–1)
BUN SERPL-MCNC: 16 MG/DL (ref 6–20)
BUN/CREAT SERPL: 19 (ref 12–20)
CALCIUM SERPL-MCNC: 9.4 MG/DL (ref 8.5–10.1)
CHLORIDE SERPL-SCNC: 108 MMOL/L (ref 97–108)
CO2 SERPL-SCNC: 26 MMOL/L (ref 21–32)
CREAT SERPL-MCNC: 0.83 MG/DL (ref 0.55–1.02)
ERYTHROCYTE [DISTWIDTH] IN BLOOD BY AUTOMATED COUNT: 12.4 % (ref 11.5–14.5)
GLOBULIN SER CALC-MCNC: 3.3 G/DL (ref 2–4)
GLUCOSE SERPL-MCNC: 143 MG/DL (ref 65–100)
HCT VFR BLD AUTO: 37.3 % (ref 35–47)
HGB BLD-MCNC: 12.3 G/DL (ref 11.5–16)
MCH RBC QN AUTO: 32 PG (ref 26–34)
MCHC RBC AUTO-ENTMCNC: 33 G/DL (ref 30–36.5)
MCV RBC AUTO: 97.1 FL (ref 80–99)
NRBC # BLD: 0 K/UL (ref 0–0.01)
NRBC BLD-RTO: 0 PER 100 WBC
PLATELET # BLD AUTO: 258 K/UL (ref 150–400)
PMV BLD AUTO: 11.1 FL (ref 8.9–12.9)
POTASSIUM SERPL-SCNC: 4.1 MMOL/L (ref 3.5–5.1)
PROT SERPL-MCNC: 6.6 G/DL (ref 6.4–8.2)
RBC # BLD AUTO: 3.84 M/UL (ref 3.8–5.2)
SODIUM SERPL-SCNC: 141 MMOL/L (ref 136–145)
TSH SERPL DL<=0.05 MIU/L-ACNC: 1.69 UIU/ML (ref 0.36–3.74)
WBC # BLD AUTO: 6.1 K/UL (ref 3.6–11)

## 2021-06-02 RX ORDER — DICLOFENAC SODIUM 10 MG/G
GEL TOPICAL
COMMUNITY
Start: 2021-05-18 | End: 2021-06-02 | Stop reason: SDUPTHER

## 2021-06-02 RX ORDER — DICLOFENAC SODIUM 10 MG/G
GEL TOPICAL 4 TIMES DAILY
Qty: 1 EACH | Refills: 2 | Status: CANCELLED | OUTPATIENT
Start: 2021-06-02

## 2021-06-02 NOTE — TELEPHONE ENCOUNTER
Daughter states mother went to ER on the 5/18 and had a VV with Dr Shanelle Bridges on 5/19. States the hospital gave her Diclofenac sodium gel 1% she needs another RX for Diclofenac sodium gel 1% for pain in her arm.  Please call pt back

## 2021-06-03 RX ORDER — DICLOFENAC SODIUM 10 MG/G
GEL TOPICAL 4 TIMES DAILY
Qty: 1 EACH | Refills: 2 | Status: SHIPPED | OUTPATIENT
Start: 2021-06-03 | End: 2021-07-16 | Stop reason: SDUPTHER

## 2021-06-09 ENCOUNTER — TELEPHONE (OUTPATIENT)
Dept: FAMILY MEDICINE CLINIC | Age: 86
End: 2021-06-09

## 2021-06-09 NOTE — TELEPHONE ENCOUNTER
FYI    Pt daughter states that she took to ER on the 19th for her arm pain and they did do xrays.  Pt has appt on Monday 14th

## 2021-06-14 ENCOUNTER — OFFICE VISIT (OUTPATIENT)
Dept: FAMILY MEDICINE CLINIC | Age: 86
End: 2021-06-14
Payer: MEDICARE

## 2021-06-14 VITALS
HEIGHT: 61 IN | HEART RATE: 80 BPM | BODY MASS INDEX: 33.95 KG/M2 | TEMPERATURE: 98.1 F | WEIGHT: 179.8 LBS | SYSTOLIC BLOOD PRESSURE: 114 MMHG | RESPIRATION RATE: 18 BRPM | OXYGEN SATURATION: 96 % | DIASTOLIC BLOOD PRESSURE: 59 MMHG

## 2021-06-14 DIAGNOSIS — D32.9 MENINGIOMA (HCC): ICD-10-CM

## 2021-06-14 DIAGNOSIS — Z23 ENCOUNTER FOR IMMUNIZATION: ICD-10-CM

## 2021-06-14 DIAGNOSIS — M25.511 ACUTE PAIN OF RIGHT SHOULDER: ICD-10-CM

## 2021-06-14 DIAGNOSIS — I82.4Y1 ACUTE DEEP VEIN THROMBOSIS (DVT) OF PROXIMAL VEIN OF RIGHT LOWER EXTREMITY (HCC): ICD-10-CM

## 2021-06-14 DIAGNOSIS — Z00.00 MEDICARE ANNUAL WELLNESS VISIT, SUBSEQUENT: Primary | ICD-10-CM

## 2021-06-14 PROCEDURE — G8427 DOCREV CUR MEDS BY ELIG CLIN: HCPCS | Performed by: FAMILY MEDICINE

## 2021-06-14 PROCEDURE — 1101F PT FALLS ASSESS-DOCD LE1/YR: CPT | Performed by: FAMILY MEDICINE

## 2021-06-14 PROCEDURE — G8510 SCR DEP NEG, NO PLAN REQD: HCPCS | Performed by: FAMILY MEDICINE

## 2021-06-14 PROCEDURE — 99214 OFFICE O/P EST MOD 30 MIN: CPT | Performed by: FAMILY MEDICINE

## 2021-06-14 PROCEDURE — 1090F PRES/ABSN URINE INCON ASSESS: CPT | Performed by: FAMILY MEDICINE

## 2021-06-14 PROCEDURE — G8536 NO DOC ELDER MAL SCRN: HCPCS | Performed by: FAMILY MEDICINE

## 2021-06-14 PROCEDURE — G0439 PPPS, SUBSEQ VISIT: HCPCS | Performed by: FAMILY MEDICINE

## 2021-06-14 PROCEDURE — G8417 CALC BMI ABV UP PARAM F/U: HCPCS | Performed by: FAMILY MEDICINE

## 2021-06-14 NOTE — PROGRESS NOTES
Chief Complaint   Patient presents with    Arm Pain     right, s/p fall     Visit Vitals  BP (!) 114/59 (BP 1 Location: Left arm, BP Patient Position: Sitting, BP Cuff Size: Adult)   Pulse 80   Temp 98.1 °F (36.7 °C) (Temporal)   Resp 18   Ht 5' 1\" (1.549 m)   Wt 179 lb 12.8 oz (81.6 kg)   LMP 01/01/1986   SpO2 96%   BMI 33.97 kg/m²     1. Have you been to the ER, urgent care clinic since your last visit? Hospitalized since your last visit? No    2. Have you seen or consulted any other health care providers outside of the [address] [place] [address] since your last visit? Include any pap smears or colon screening.  No    Reviewed record in preparation for visit and have necessary documentation  Pt did not bring medication to office visit for review  opportunity was given for questions  Goals that were addressed and/or need to be completed during or after this appointment include   Health Maintenance Due   Topic Date Due    COVID-19 Vaccine (1) Never done    Shingrix Vaccine Age 50> (1 of 2) Never done    Bone Densitometry (Dexa) Screening  Never done    Pneumococcal 65+ years (2 of 2 - PPSV23) 07/22/2017    Medicare Yearly Exam  07/24/2019

## 2021-06-14 NOTE — PROGRESS NOTES
[facility]    History of Present Illness:   Nora Owusu is a 80 y.o. female with history of HTN, Tubular adenoma, Meningioma, MID  CC: Right shoulder pain  History provided by patient and Records    HPI:  Shoulder Pain:  Reports pain of the Right  Shoulder. Pain secondary to Fall 1 month. Overall symptoms are are improving. Location: Right  shoulder - between neck and shoulder  Duration: Pain/Injury started 1 Month ago. Pain lasts few seconds. Quality: aching in characteristic. Severity: pain is perceived as severe (6-8 pain scale)  Onset: sudden  Radiation: Noting None. Other Symptoms:  Denies redness, locking, Swelling. Patient does not endorse weakness/tingling sensation. Modifying Factors: Pain is worsened by Rest.  Pain is improved by Voltaren gel. Previous remedies tried include Voltaren gel  Previous Injuries/Surgeries: None  Previous Imaging: XR 1 month ago. Health Maintenance  Health Maintenance Due   Topic Date Due    COVID-19 Vaccine (1) Never done    Shingrix Vaccine Age 50> (1 of 2) Never done    Bone Densitometry (Dexa) Screening  Never done    Pneumococcal 65+ years (2 of 2 - PPSV23) 07/22/2017    Medicare Yearly Exam  07/24/2019       Past Medical, Family, and Social History:     Current Outpatient Medications on File Prior to Visit   Medication Sig Dispense Refill    diclofenac (VOLTAREN) 1 % gel Apply  to affected area four (4) times daily. 1 Each 2    lisinopriL (PRINIVIL, ZESTRIL) 10 mg tablet Take 1 Tab by mouth daily. Take medication in the morning (Patient taking differently: Take 5 mg by mouth daily. Take medication in the morning) 90 Tab 1    carbamide peroxide (DEBROX) 6.5 % otic solution Administer 5 Drops into each ear two (2) times a day. (Patient taking differently: Administer 5 Drops into each ear as needed.) 30 mL 0    sodium chloride (OCEAN) 0.65 % nasal squeeze bottle 0.05 mL by Both Nostrils route as needed for Congestion.  44 mL 4  cinnamon bark (CINNAMON) 500 mg cap Take  by mouth.  aspirin 81 mg chewable tablet Take 81 mg by mouth daily.  cholecalciferol, VITAMIN D3, (VITAMIN D3) 5,000 unit tab tablet Take  by mouth daily.  rivaroxaban (XARELTO) 20 mg tab tablet Take 1 Tab by mouth daily. (Patient not taking: Reported on 6/14/2021) 90 Tab 1     No current facility-administered medications on file prior to visit. Patient Active Problem List   Diagnosis Code    Allergic rhinitis J30.9    HTN (hypertension) I10    Nephrolithiasis N20.0    Cholelithiasis K80.20    Tubular adenoma D36.9    Melanosis coli K63.89    Meningioma ([address] D32.9    Sensorineural hearing loss of both ears H90.3    Obesity, Class II, BMI 35-39.9 E66.9    BMI 35.0-35.9,adult Z68.35    Actinic keratoses L57.0    Parapelvic renal cyst N28.1       Social History     Socioeconomic History    Marital status:      Spouse name: Not on file    Number of children: Not on file    Years of education: Not on file    Highest education level: Not on file   Occupational History    Not on file   Tobacco Use    Smoking status: Never Smoker    Smokeless tobacco: Never Used   Substance and Sexual Activity    Alcohol use: No    Drug use: No    Sexual activity: Not on file   Other Topics Concern    Not on file   Social History Narrative    Not on file     Social Determinants of Health     Financial Resource Strain:     Difficulty of Paying Living Expenses:    Food Insecurity:     Worried About Running Out of Food in the Last Year:     Ran Out of Food in the Last Year:    Transportation Needs:     Lack of Transportation (Medical):      Lack of Transportation (Non-Medical):    Physical Activity:     Days of Exercise per Week:     Minutes of Exercise per Session:    Stress:     Feeling of Stress :    Social Connections:     Frequency of Communication with Friends and Family:     Frequency of Social Gatherings with Friends and Family:  Attends [employer] Services:     Active Member of Clubs or Organizations:     Attends Club or Organization Meetings:     Marital Status:    Intimate Partner Violence:     Fear of Current or Ex-Partner:     Emotionally Abused:     Physically Abused:     Sexually Abused:        Review of Systems   Review of Systems   Constitutional: Negative for chills and fever. HENT: Negative for congestion. Cardiovascular: Negative for chest pain and palpitations. Gastrointestinal: Negative for abdominal pain, nausea and vomiting. Musculoskeletal: Positive for joint pain. Neurological: Negative for tingling, tremors and headaches. Objective:     Visit Vitals  BP (!) 114/59 (BP 1 Location: Left arm, BP Patient Position: Sitting, BP Cuff Size: Adult)   Pulse 80   Temp 98.1 °F (36.7 °C) (Temporal)   Resp 18   Ht 5' 1\" (1.549 m)   Wt 179 lb 12.8 oz (81.6 kg)   LMP 01/01/1986   SpO2 96%   BMI 33.97 kg/m²        Physical Exam  Vitals and nursing note reviewed. Constitutional:       Appearance: Normal appearance. HENT:      Head: Normocephalic and atraumatic. Cardiovascular:      Rate and Rhythm: Normal rate and regular rhythm. Pulses: Normal pulses. Heart sounds: Normal heart sounds. No murmur heard. No friction rub. No gallop. Pulmonary:      Effort: Pulmonary effort is normal.      Breath sounds: Normal breath sounds. Abdominal:      General: Abdomen is flat. Bowel sounds are normal.      Palpations: Abdomen is soft. Musculoskeletal:      Cervical back: Normal range of motion and neck supple. Neurological:      Mental Status: She is alert. Pertinent Labs/Studies:      Assessment and orders:       ICD-10-CM ICD-9-CM    1. Medicare annual wellness visit, subsequent  Z00.00 V70.0    2. Encounter for immunization  Z23 V03.89 pneumococcal 23-karin ps vaccine (PNEUMOVAX 23) 25 mcg/0.5 mL injection   3. Acute pain of right shoulder  M25.511 719.41    4.  Acute deep vein thrombosis (DVT) of proximal vein of right lower extremity ([facility])  I82.4Y1 453.41    5. Meningioma ([facility])  D32.9 225.2      Diagnoses and all orders for this visit:    1. Medicare annual wellness visit, subsequent    2. Encounter for immunization  -     pneumococcal 23-karin ps vaccine (PNEUMOVAX 23) 25 mcg/0.5 mL injection; 0.5 mL by IntraMUSCular route once for 1 dose. 3. Acute pain of right shoulder: Likely related to Rotator cuff injury given symptoms. Discussed options and not interested in surgery. Continue PT and Topicals at his time     4. Acute deep vein thrombosis (DVT) of proximal vein of right lower extremity ([facility] [address]): Stable, no issues at this time    5. Meningioma [facility]): Stable at this time      Follow-up and Dispositions    · Return in about 4 months (around 10/14/2021), or if symptoms worsen or fail to improve. I have discussed the diagnosis with the patient and the intended plan as seen in the above orders. Social history, medical history, and labs were reviewed. The patient has received an after-visit summary and questions were answered concerning future plans. I have discussed medication side effects and warnings with the patient as well.     MD KRIS Hammond & NAT OHARA [facility]  06/14/21

## 2021-06-14 NOTE — PATIENT INSTRUCTIONS
Tylenol 650 every 6 hours as needed for. Medicare Wellness Visit, Female     The best way to live healthy is to have a lifestyle where you eat a well-balanced diet, exercise regularly, limit alcohol use, and quit all forms of tobacco/nicotine, if applicable. Regular preventive services are another way to keep healthy. Preventive services (vaccines, screening tests, monitoring & exams) can help personalize your care plan, which helps you manage your own care. Screening tests can find health problems at the earliest stages, when they are easiest to treat. [facility] follows the current, evidence-based guidelines published by the [facility] (USPSTF) when recommending preventive services for our patients. Because we follow these guidelines, sometimes recommendations change over time as research supports it. (For example, mammograms used to be recommended annually. Even though Medicare will still pay for an annual mammogram, the newer guidelines recommend a mammogram every two years for women of average risk). Of course, you and your doctor may decide to screen more often for some diseases, based on your risk and your co-morbidities (chronic disease you are already diagnosed with). Preventive services for you include:  - Medicare offers their members a free annual wellness visit, which is time for you and your primary care provider to discuss and plan for your preventive service needs. Take advantage of this benefit every year!  -All adults over the age of 72 should receive the recommended pneumonia vaccines. Current USPSTF guidelines recommend a series of two vaccines for the best pneumonia protection.   -All adults should have a flu vaccine yearly and a tetanus vaccine every 10 years.   -All adults age 48 and older should receive the shingles vaccines (series of two vaccines).       -All adults age 38-68 who are overweight should have a diabetes screening test once every three years.   -All adults born between [birth year] and 1965 should be screened once for Hepatitis C.  -Other screening tests and preventive services for persons with diabetes include: an eye exam to screen for diabetic retinopathy, a kidney function test, a foot exam, and stricter control over your cholesterol.   -Cardiovascular screening for adults with routine risk involves an electrocardiogram (ECG) at intervals determined by your doctor.   -Colorectal cancer screenings should be done for adults age 54-65 with no increased risk factors for colorectal cancer. There are a number of acceptable methods of screening for this type of cancer. Each test has its own benefits and drawbacks. Discuss with your doctor what is most appropriate for you during your annual wellness visit. The different tests include: colonoscopy (considered the best screening method), a fecal occult blood test, a fecal DNA test, and sigmoidoscopy.    -A bone mass density test is recommended when a woman turns 65 to screen for osteoporosis. This test is only recommended one time, as a screening. Some providers will use this same test as a disease monitoring tool if you already have osteoporosis. -Breast cancer screenings are recommended every other year for women of normal risk, age 54-69.  -Cervical cancer screenings for women over age 72 are only recommended with certain risk factors.      Here is a list of your current Health Maintenance items (your personalized list of preventive services) with a due date:  Health Maintenance Due   Topic Date Due    COVID-19 Vaccine (1) Never done    Shingles Vaccine (1 of 2) Never done    Bone Mineral Density   Never done    Pneumococcal Vaccine (2 of 2 - PPSV23) 07/22/2017

## 2021-06-14 NOTE — PROGRESS NOTES
This is the Subsequent Medicare Annual Wellness Exam, performed 12 months or more after the Initial AWV or the last Subsequent AWV    I have reviewed the patient's medical history in detail and updated the computerized patient record. Assessment/Plan   Education and counseling provided:  Are appropriate based on today's review and evaluation  End-of-Life planning (with patient's consent)  Fall risk and walking safety    5. Medicare annual wellness visit, subsequent  1. Encounter for immunization  -     pneumococcal 23-karin ps vaccine (PNEUMOVAX 23) 25 mcg/0.5 mL injection; 0.5 mL by IntraMUSCular route once for 1 dose., Print, Disp-0.5 mL, R-0  2. Acute pain of right shoulder  3. Acute deep vein thrombosis (DVT) of proximal vein of right lower extremity (HCC)  4. Meningioma ([address]         Depression Risk Factor Screening     3 most recent PHQ Screens 6/14/2021   Little interest or pleasure in doing things Not at all   Feeling down, depressed, irritable, or hopeless Not at all   Total Score PHQ 2 0       Alcohol Risk Screen    Do you average more than 1 drink per night or more than 7 drinks a week:  No    On any one occasion in the past three months have you have had more than 3 drinks containing alcohol:  No        Functional Ability and Level of Safety    Hearing: Hearing is good. Activities of Daily Living: The home contains: handrails, grab bars, rugs and poor lighting  Patient needs help with:  phone, transportation, shopping, laundry, housework and managing money      Ambulation: with difficulty, uses a Rollator wheelchair     Fall Risk:  Fall Risk Assessment, last 12 mths 5/19/2021   Able to walk? Yes   Fall in past 12 months? 1   Do you feel unsteady? 1   Are you worried about falling 1   Number of falls in past 12 months -   Fall with injury?  0      Abuse Screen:  Patient is not abused       Cognitive Screening    Has your family/caregiver stated any concerns about your memory: yes - Memory issues      Health Maintenance Due     Health Maintenance Due   Topic Date Due    COVID-19 Vaccine (1) Never done    Shingrix Vaccine Age 50> (1 of 2) Never done    Bone Densitometry (Dexa) Screening  Never done    Pneumococcal 65+ years (2 of 2 - PPSV23) 07/22/2017       Patient Care Team   Patient Care Team:  Eleazar Fowler MD as PCP - General (Family Medicine)  Eleazar Fowler MD as PCP - [address] [place] [address] Provider  Elise King MD (Neurosurgery)    History     Patient Active Problem List   Diagnosis Code    Allergic rhinitis J30.9    HTN (hypertension) I10    Nephrolithiasis N20.0    Cholelithiasis K80.20    Tubular adenoma D36.9    Melanosis coli K63.89    Meningioma ([address] D32.9    Sensorineural hearing loss of both ears H90.3    Obesity, Class II, BMI 35-39.9 E66.9    BMI 35.0-35.9,adult Z68.35    Actinic keratoses L57.0    Parapelvic renal cyst N28.1     Past Medical History:   Diagnosis Date    Hypertension     IBS (irritable bowel syndrome)     M[facility] ([facility]     Tubular adenoma 10/2/2012      Past Surgical History:   Procedure Laterality Date    MN ABDOMEN SURGERY PROC UNLISTED       Current Outpatient Medications   Medication Sig Dispense Refill    pneumococcal 23-karin ps vaccine (PNEUMOVAX 23) 25 mcg/0.5 mL injection 0.5 mL by IntraMUSCular route once for 1 dose. 0.5 mL 0    diclofenac (VOLTAREN) 1 % gel Apply  to affected area four (4) times daily. 1 Each 2    lisinopriL (PRINIVIL, ZESTRIL) 10 mg tablet Take 1 Tab by mouth daily. Take medication in the morning (Patient taking differently: Take 5 mg by mouth daily. Take medication in the morning) 90 Tab 1    carbamide peroxide (DEBROX) 6.5 % otic solution Administer 5 Drops into each ear two (2) times a day. (Patient taking differently: Administer 5 Drops into each ear as needed.) 30 mL 0    sodium chloride (OCEAN) 0.65 % nasal squeeze bottle 0.05 mL by Both Nostrils route as needed for Congestion.  44 mL 4    cinnamon bark (CINNAMON) 500 mg cap Take  by mouth.  aspirin 81 mg chewable tablet Take 81 mg by mouth daily.  cholecalciferol, VITAMIN D3, (VITAMIN D3) 5,000 unit tab tablet Take  by mouth daily.  rivaroxaban (XARELTO) 20 mg tab tablet Take 1 Tab by mouth daily.  (Patient not taking: Reported on 6/14/2021) 90 Tab 1     No Known Allergies    Family History   Problem Relation Age of Onset    Cancer Mother [de-identified]        breast cancer    Alcohol abuse Father      Social History     Tobacco Use    Smoking status: Never Smoker    Smokeless tobacco: Never Used   Substance Use Topics    Alcohol use: No         Kosta Santizo MD

## 2021-06-16 ENCOUNTER — TELEPHONE (OUTPATIENT)
Dept: FAMILY MEDICINE CLINIC | Age: 86
End: 2021-06-16

## 2021-06-16 NOTE — TELEPHONE ENCOUNTER
Returned call to daughter, daughter unavailable, left message to return call. Mother did not receive any medications in office and no xray was ordered.

## 2021-06-16 NOTE — TELEPHONE ENCOUNTER
Pt's daughter Lewis Bai) called to see if there was anything unusual or did she receive any medication while in the office. She is lethargic and seem to be acting different. She was here on June 14th. Asking for a phone call please. Also wanted to know if any x-rays were done as requested.

## 2021-06-17 ENCOUNTER — TELEPHONE (OUTPATIENT)
Dept: FAMILY MEDICINE CLINIC | Age: 86
End: 2021-06-17

## 2021-06-17 NOTE — TELEPHONE ENCOUNTER
----- Message from Lakisha Organ sent at 6/17/2021 11:22 AM EDT -----  Regarding: Dr. Bianca Fuentes  General Message/Vendor Calls    Caller's first and last name: [facility]      Reason for call: went to ER, not acting normal. Lethargic, trouble walking, difficulty following instructions.  Is now at [facility] required yes/no and why: no      Best contact number(s): [phone]      Details to clarify the request: [address]

## 2021-07-12 ENCOUNTER — TELEPHONE (OUTPATIENT)
Dept: FAMILY MEDICINE CLINIC | Age: 86
End: 2021-07-12

## 2021-07-12 NOTE — TELEPHONE ENCOUNTER
Spoke with Emiliana, wanted to inform patient was in the hospital x1 week, then went to [place] [facility] x2 weeks, is currently at home, nursing PT/OT services started today.

## 2021-07-12 NOTE — TELEPHONE ENCOUNTER
----- Message from Cande Womack sent at 7/12/2021  9:44 AM EDT -----  Regarding: Dr. Osvaldo Lopez / telephone  General Message/Vendor Calls    Caller's first and last name: [employer] w/Teres[facility] [address] [place] [address]      Reason for call: Wants to speak w/Dr. Osvaldo Lopez or nurse regarding patient being released from hospital this past weekend.        Callback required yes/no and why:yes to discuss      Best contact number(s):[phone]      Details to clarify the request:      Bhaveshwilliam Shanta

## 2021-07-16 ENCOUNTER — OFFICE VISIT (OUTPATIENT)
Dept: FAMILY MEDICINE CLINIC | Age: 86
End: 2021-07-16
Payer: MEDICARE

## 2021-07-16 ENCOUNTER — TELEPHONE (OUTPATIENT)
Dept: FAMILY MEDICINE CLINIC | Age: 86
End: 2021-07-16

## 2021-07-16 VITALS
HEIGHT: 61 IN | HEART RATE: 63 BPM | SYSTOLIC BLOOD PRESSURE: 122 MMHG | RESPIRATION RATE: 18 BRPM | WEIGHT: 180 LBS | DIASTOLIC BLOOD PRESSURE: 68 MMHG | BODY MASS INDEX: 33.99 KG/M2 | TEMPERATURE: 98.2 F | OXYGEN SATURATION: 97 %

## 2021-07-16 DIAGNOSIS — A41.9 SEPSIS, DUE TO UNSPECIFIED ORGANISM, UNSPECIFIED WHETHER ACUTE ORGAN DYSFUNCTION PRESENT (HCC): Primary | ICD-10-CM

## 2021-07-16 DIAGNOSIS — Z09 HOSPITAL DISCHARGE FOLLOW-UP: ICD-10-CM

## 2021-07-16 DIAGNOSIS — R53.81 DEBILITY: ICD-10-CM

## 2021-07-16 DIAGNOSIS — R53.82 CHRONIC FATIGUE: ICD-10-CM

## 2021-07-16 DIAGNOSIS — R73.03 PREDIABETES: ICD-10-CM

## 2021-07-16 DIAGNOSIS — M19.90 ARTHRITIS PAIN: ICD-10-CM

## 2021-07-16 PROCEDURE — G8427 DOCREV CUR MEDS BY ELIG CLIN: HCPCS | Performed by: FAMILY MEDICINE

## 2021-07-16 PROCEDURE — G8510 SCR DEP NEG, NO PLAN REQD: HCPCS | Performed by: FAMILY MEDICINE

## 2021-07-16 PROCEDURE — 99214 OFFICE O/P EST MOD 30 MIN: CPT | Performed by: FAMILY MEDICINE

## 2021-07-16 PROCEDURE — G8536 NO DOC ELDER MAL SCRN: HCPCS | Performed by: FAMILY MEDICINE

## 2021-07-16 PROCEDURE — 1101F PT FALLS ASSESS-DOCD LE1/YR: CPT | Performed by: FAMILY MEDICINE

## 2021-07-16 PROCEDURE — 1090F PRES/ABSN URINE INCON ASSESS: CPT | Performed by: FAMILY MEDICINE

## 2021-07-16 PROCEDURE — G8417 CALC BMI ABV UP PARAM F/U: HCPCS | Performed by: FAMILY MEDICINE

## 2021-07-16 RX ORDER — ACETAMINOPHEN 325 MG/1
650 TABLET ORAL
Qty: 90 TABLET | Refills: 1 | Status: SHIPPED | OUTPATIENT
Start: 2021-07-16

## 2021-07-16 RX ORDER — DICLOFENAC SODIUM 10 MG/G
1 GEL TOPICAL 4 TIMES DAILY
Qty: 350 G | Refills: 2 | Status: SHIPPED | OUTPATIENT
Start: 2021-07-16 | End: 2022-01-10 | Stop reason: SDUPTHER

## 2021-07-16 NOTE — PROGRESS NOTES
Chief Complaint   Patient presents with   [facility] Follow Up     JW and [facility]     Visit Vitals  /68 (BP 1 Location: Left arm, BP Patient Position: Sitting, BP Cuff Size: Adult)   Pulse 63   Temp 98.2 °F (36.8 °C) (Temporal)   Resp 18   Ht 5' 1\" (1.549 m)   Wt 180 lb (81.6 kg)   LMP 01/01/1986   SpO2 97%   BMI 34.01 kg/m²     1. Have you been to the ER, urgent care clinic since your last visit? Hospitalized since your last visit? Yes JW    2. Have you seen or consulted any other health care providers outside of the [address] [place] [address] since your last visit? Include any pap smears or colon screening.  No    Reviewed record in preparation for visit and have necessary documentation  Pt did not bring medication to office visit for review  opportunity was given for questions  Goals that were addressed and/or need to be completed during or after this appointment include   Health Maintenance Due   Topic Date Due    COVID-19 Vaccine (1) Never done    Shingrix Vaccine Age 50> (1 of 2) Never done    Bone Densitometry (Dexa) Screening  Never done    Pneumococcal 65+ years (2 of 2 - PPSV23) 07/22/2017

## 2021-07-16 NOTE — PROGRESS NOTES
[address]  [phone]        Transition of Care Visit    Patient: Josiah Rosa MRN: [id number]  SSN: [id number]    YOB: 1934  Age: 80 y.o. Sex: female      Hospital: [facility]  Dates of admission: 6/16/21-6/24/21, 6/24/21-7/10/21  Discharge diagnoses: Sepsis, debility  State of health at discharge: Stable  Surgical or invasive procedures done: None    Amount and/or Complexity of Data Reviewed:   Clinical lab tests: Reviewed or ordered   Tests in the radiology section: reviewed or ordered  Discussion of test results with the patient: yes  Obtain previous medical records or obtain history from someone other than the patient: Yes  Obtain history from someone other than the patient: Yes  Review and address past medical records: Yes  Discuss the patient with another provider: no  Independant visualization of image, tracing, or specimen: no    Risk of Significant Complications, Morbidity, and/or Mortality:   Presenting problems: High   Diagnostic procedures: Moderate   Management options: Moderate     Transition of Care time spent:   Total time providing care and documentation: 40-60 minutes   Progress at discharge:   Stable on Discharge      Progress Note    Patient: Josiah Rosa MRN: [id number]  SSN: [id number]    YOB: 1934  Age: 80 y.o. Sex: female        Chief Complaint   Patient presents with   [address] [place] [address] Rehab         Subjective:     Encounter Diagnoses   Name Primary?  Sepsis, due to unspecified organism, unspecified whether acute organ dysfunction present [facility]) Yes   [facility] discharge follow-up     Arthritis pain     Chronic fatigue     Prediabetes      Daughter of patient reports found patient acting abnormally and taken to ER where admitted for dehydration and infection of unknown origin.   Treated with fluids and antibiotics and discharged after 8 days to Rehab, spent 3.5 weeks in rehab. Noting improvement and maybe at 40-50% of normal.  Getting help with transfers but is walking with walker. Getting PT at [facility] as well. Current and past medical information:    Current Medications after this visit[de-identified]     Current Outpatient Medications   Medication Sig    acetaminophen (TYLENOL) 325 mg tablet Take 2 Tablets by mouth every six (6) hours as needed for Pain.  diclofenac (VOLTAREN) 1 % gel Apply  to affected area four (4) times daily.  lisinopriL (PRINIVIL, ZESTRIL) 10 mg tablet Take 1 Tab by mouth daily. Take medication in the morning (Patient taking differently: Take 5 mg by mouth daily. Take medication in the morning)    carbamide peroxide (DEBROX) 6.5 % otic solution Administer 5 Drops into each ear two (2) times a day. (Patient taking differently: Administer 5 Drops into each ear as needed.)    sodium chloride (OCEAN) 0.65 % nasal squeeze bottle 0.05 mL by Both Nostrils route as needed for Congestion.  cinnamon bark (CINNAMON) 500 mg cap Take  by mouth.  aspirin 81 mg chewable tablet Take 81 mg by mouth daily.  cholecalciferol, VITAMIN D3, (VITAMIN D3) 5,000 unit tab tablet Take  by mouth daily.  rivaroxaban (XARELTO) 20 mg tab tablet Take 1 Tab by mouth daily. (Patient not taking: Reported on 6/14/2021)     No current facility-administered medications for this visit.        Health Maintenance Due   Topic Date Due    COVID-19 Vaccine (1) Never done    Shingrix Vaccine Age 50> (1 of 2) Never done    Bone Densitometry (Dexa) Screening  Never done    Pneumococcal 65+ years (2 of 2 - PPSV23) 07/22/2017       Patient Active Problem List    Diagnosis Date Noted    Parapelvic renal cyst 10/30/2017    Actinic keratoses 08/04/2016    Obesity, Class II, BMI 35-39.9 02/18/2016    BMI 35.0-35.9,adult 02/18/2016    Sensorineural hearing loss of both ears 10/03/2014    Meningioma ([address] 02/08/2013    Tubular adenoma 10/02/2012    Melanosis coli 10/02/2012    Nephrolithiasis 09/27/2012    Cholelithiasis 09/27/2012    Allergic rhinitis 11/15/2010    HTN (hypertension) 11/15/2010       Past Medical History:   Diagnosis Date    Hypertension     IBS (irritable bowel syndrome)     Meningioma (HCC)     Tubular adenoma 10/2/2012       No Known Allergies    Past Surgical History:   Procedure Laterality Date    AK ABDOMEN SURGERY PROC UNLISTED         Social History     Socioeconomic History    Marital status:      Spouse name: Not on file    Number of children: Not on file    Years of education: Not on file    Highest education level: Not on file   Tobacco Use    Smoking status: Never Smoker    Smokeless tobacco: Never Used   Substance and Sexual Activity    Alcohol use: No    Drug use: No     Social Determinants of Health     Financial Resource Strain:     Difficulty of Paying Living Expenses:    Food Insecurity:     Worried About Running Out of Food in the Last Year:     Ran Out of Food in the Last Year:    Transportation Needs:     Lack of Transportation (Medical):  Lack of Transportation (Non-Medical):    Physical Activity:     Days of Exercise per Week:     Minutes of Exercise per Session:    Stress:     Feeling of Stress :    Social Connections:     Frequency of Communication with Friends and Family:     Frequency of Social Gatherings with Friends and Family:     Attends Taoism Services:     Active Member of Clubs or Organizations:     Attends Club or Organization Meetings:     Marital Status:          Review of Systems   Review of Systems   Constitutional: Negative for chills and fever. HENT: Negative for congestion. Cardiovascular: Negative for chest pain and palpitations. Gastrointestinal: Negative for nausea and vomiting.        Objective:     Vitals:    07/16/21 1519   BP: 122/68   Pulse: 63   Resp: 18   Temp: 98.2 °F (36.8 °C)   TempSrc: Temporal   SpO2: 97% Weight: 180 lb (81.6 kg)   Height: 5' 1\" (1.549 m)      Body mass index is 34.01 kg/m². Physical Exam  Vitals and nursing note reviewed. Constitutional:       Appearance: Normal appearance. HENT:      Head: Normocephalic and atraumatic. Cardiovascular:      Rate and Rhythm: Normal rate and regular rhythm. Pulses: Normal pulses. Heart sounds: Normal heart sounds. No murmur heard. No friction rub. No gallop. Pulmonary:      Effort: Pulmonary effort is normal.      Breath sounds: Normal breath sounds. Abdominal:      General: Abdomen is flat. Bowel sounds are normal.      Palpations: Abdomen is soft. Musculoskeletal:      Cervical back: Normal range of motion and neck supple. Skin:     General: Skin is warm and dry. Neurological:      Mental Status: She is alert. Assessment and orders:       ICD-10-CM ICD-9-CM    1. Sepsis, due to unspecified organism, unspecified whether acute organ dysfunction present ([facility]  A41.9 038.9 CBC W/O DIFF     900.64 METABOLIC PANEL, COMPREHENSIVE   2. Debility  R53.81 799.3    3. Hospital discharge follow-up  Z09 V67.59    4. Arthritis pain  M19.90 716.90 acetaminophen (TYLENOL) 325 mg tablet   5. Chronic fatigue  R53.82 780.79 TSH 3RD GENERATION   6. Prediabetes  R73.03 790.29 HEMOGLOBIN A1C WITH EAG     Diagnoses and all orders for this visit:    1. Sepsis, due to unspecified organism, unspecified whether acute organ dysfunction present ([facility]  -     CBC W/O DIFF; Future  -     METABOLIC PANEL, COMPREHENSIVE; Future    2. Debility: Has PT/OT coming    3. Hospital discharge follow-up: Getting Records    4. Arthritis pain  -     acetaminophen (TYLENOL) 325 mg tablet; Take 2 Tablets by mouth every six (6) hours as needed for Pain. 5. Chronic fatigue  -     TSH 3RD GENERATION; Future    6. Prediabetes  -     HEMOGLOBIN A1C WITH EAG; Future      Follow-up and Dispositions    · Return in about 4 weeks (around 8/13/2021).              Plan of care:  Discussed diagnoses in detail with patient. Medication risks/benefits/side effects discussed with patient. All of the patient's questions were addressed. The patient understands and agrees with our plan of care. The patient knows to call back if they are unsure of or forget any changes we discussed today or if the symptoms change. The patient received an After-Visit Summary which contains VS, orders, medication list and allergy list. This can be used as a \"mini-medical record\" should they have to seek medical care while out of town. Follow-up and Dispositions    · Return in about 4 weeks (around 8/13/2021). No future appointments.     Signed By: Yifan Mabry MD     July 16, 2021

## 2021-07-17 LAB
ALBUMIN SERPL-MCNC: 3.3 G/DL (ref 3.5–5)
ALBUMIN/GLOB SERPL: 0.9 {RATIO} (ref 1.1–2.2)
ALP SERPL-CCNC: 72 U/L (ref 45–117)
ALT SERPL-CCNC: 15 U/L (ref 12–78)
ANION GAP SERPL CALC-SCNC: 4 MMOL/L (ref 5–15)
AST SERPL-CCNC: 14 U/L (ref 15–37)
BILIRUB SERPL-MCNC: 0.2 MG/DL (ref 0.2–1)
BUN SERPL-MCNC: 16 MG/DL (ref 6–20)
BUN/CREAT SERPL: 18 (ref 12–20)
CALCIUM SERPL-MCNC: 9.9 MG/DL (ref 8.5–10.1)
CHLORIDE SERPL-SCNC: 108 MMOL/L (ref 97–108)
CO2 SERPL-SCNC: 28 MMOL/L (ref 21–32)
CREAT SERPL-MCNC: 0.88 MG/DL (ref 0.55–1.02)
ERYTHROCYTE [DISTWIDTH] IN BLOOD BY AUTOMATED COUNT: 13 % (ref 11.5–14.5)
EST. AVERAGE GLUCOSE BLD GHB EST-MCNC: 111 MG/DL
GLOBULIN SER CALC-MCNC: 3.6 G/DL (ref 2–4)
GLUCOSE SERPL-MCNC: 92 MG/DL (ref 65–100)
HBA1C MFR BLD: 5.5 % (ref 4–5.6)
HCT VFR BLD AUTO: 38 % (ref 35–47)
HGB BLD-MCNC: 12.1 G/DL (ref 11.5–16)
MCH RBC QN AUTO: 33.2 PG (ref 26–34)
MCHC RBC AUTO-ENTMCNC: 31.8 G/DL (ref 30–36.5)
MCV RBC AUTO: 104.1 FL (ref 80–99)
NRBC # BLD: 0 K/UL (ref 0–0.01)
NRBC BLD-RTO: 0 PER 100 WBC
PLATELET # BLD AUTO: 287 K/UL (ref 150–400)
PMV BLD AUTO: 10.9 FL (ref 8.9–12.9)
POTASSIUM SERPL-SCNC: 4.7 MMOL/L (ref 3.5–5.1)
PROT SERPL-MCNC: 6.9 G/DL (ref 6.4–8.2)
RBC # BLD AUTO: 3.65 M/UL (ref 3.8–5.2)
SODIUM SERPL-SCNC: 140 MMOL/L (ref 136–145)
TSH SERPL DL<=0.05 MIU/L-ACNC: 2.36 UIU/ML (ref 0.36–3.74)
WBC # BLD AUTO: 5.8 K/UL (ref 3.6–11)

## 2021-08-18 ENCOUNTER — OFFICE VISIT (OUTPATIENT)
Dept: FAMILY MEDICINE CLINIC | Age: 86
End: 2021-08-18
Payer: MEDICARE

## 2021-08-18 VITALS
HEIGHT: 61 IN | OXYGEN SATURATION: 96 % | HEART RATE: 59 BPM | BODY MASS INDEX: 35.91 KG/M2 | TEMPERATURE: 98.2 F | SYSTOLIC BLOOD PRESSURE: 129 MMHG | RESPIRATION RATE: 18 BRPM | WEIGHT: 190.2 LBS | DIASTOLIC BLOOD PRESSURE: 58 MMHG

## 2021-08-18 DIAGNOSIS — G89.29 CHRONIC PAIN OF RIGHT KNEE: Primary | ICD-10-CM

## 2021-08-18 DIAGNOSIS — I10 ESSENTIAL HYPERTENSION: ICD-10-CM

## 2021-08-18 DIAGNOSIS — E66.9 OBESITY, CLASS II, BMI 35-39.9: ICD-10-CM

## 2021-08-18 DIAGNOSIS — M25.561 CHRONIC PAIN OF RIGHT KNEE: Primary | ICD-10-CM

## 2021-08-18 PROCEDURE — G8427 DOCREV CUR MEDS BY ELIG CLIN: HCPCS | Performed by: FAMILY MEDICINE

## 2021-08-18 PROCEDURE — G8417 CALC BMI ABV UP PARAM F/U: HCPCS | Performed by: FAMILY MEDICINE

## 2021-08-18 PROCEDURE — 1101F PT FALLS ASSESS-DOCD LE1/YR: CPT | Performed by: FAMILY MEDICINE

## 2021-08-18 PROCEDURE — 1090F PRES/ABSN URINE INCON ASSESS: CPT | Performed by: FAMILY MEDICINE

## 2021-08-18 PROCEDURE — G8510 SCR DEP NEG, NO PLAN REQD: HCPCS | Performed by: FAMILY MEDICINE

## 2021-08-18 PROCEDURE — 99214 OFFICE O/P EST MOD 30 MIN: CPT | Performed by: FAMILY MEDICINE

## 2021-08-18 PROCEDURE — G8536 NO DOC ELDER MAL SCRN: HCPCS | Performed by: FAMILY MEDICINE

## 2021-08-18 NOTE — PROGRESS NOTES
[facility]    History of Present Illness:   Wallace Oliva is a 80 y.o. female with history of HTN, Tubular adenoma, Meningioma, MID, Allergies, Cholelithiasis  CC: Follow up  History provided by patient and Records    HPI:  Knee pain: Per daughter patient has returned to baseline ambulation with rollator, but is noting pain in the knees, right worse than left overall. Pain primarily with weight bearing. Dose not bother her with sitting and using exercise bike as well. Obesity: Noting some weight gain recently. Noting diet is mixed but towards carbs. Possible Fluid as well. Noting some leg swelling. Health Maintenance  Health Maintenance Due   Topic Date Due    COVID-19 Vaccine (1) Never done    Shingrix Vaccine Age 50> (1 of 2) Never done    Bone Densitometry (Dexa) Screening  Never done       Past Medical, Family, and Social History:     Current Outpatient Medications on File Prior to Visit   Medication Sig Dispense Refill    acetaminophen (TYLENOL) 325 mg tablet Take 2 Tablets by mouth every six (6) hours as needed for Pain. 90 Tablet 1    diclofenac (VOLTAREN) 1 % gel Apply 1 g to affected area four (4) times daily. On the left shoulder 350 g 2    carbamide peroxide (DEBROX) 6.5 % otic solution Administer 5 Drops into each ear two (2) times a day. (Patient taking differently: Administer 5 Drops into each ear as needed.) 30 mL 0    sodium chloride (OCEAN) 0.65 % nasal squeeze bottle 0.05 mL by Both Nostrils route as needed for Congestion. 44 mL 4    cinnamon bark (CINNAMON) 500 mg cap Take  by mouth.  aspirin 81 mg chewable tablet Take 81 mg by mouth daily.  cholecalciferol, VITAMIN D3, (VITAMIN D3) 5,000 unit tab tablet Take  by mouth daily.  [DISCONTINUED] lisinopriL (PRINIVIL, ZESTRIL) 10 mg tablet Take 1 Tab by mouth daily.  Take medication in the morning (Patient not taking: Reported on 8/18/2021) 90 Tab 1    [DISCONTINUED] rivaroxaban (XARELTO) 20 mg tab tablet Take 1 Tab by mouth daily. (Patient not taking: Reported on 6/14/2021) 90 Tab 1     No current facility-administered medications on file prior to visit. Patient Active Problem List   Diagnosis Code    Allergic rhinitis J30.9    HTN (hypertension) I10    Nephrolithiasis N20.0    Cholelithiasis K80.20    Tubular adenoma D36.9    Melanosis coli K63.89    Meningioma ([address] D32.9    Sensorineural hearing loss of both ears H90.3    Obesity, Class II, BMI 35-39.9 E66.9    BMI 35.0-35.9,adult Z68.35    Actinic keratoses L57.0    Parapelvic renal cyst N28.1       Social History     Socioeconomic History    Marital status:      Spouse name: Not on file    Number of children: Not on file    Years of education: Not on file    Highest education level: Not on file   Occupational History    Not on file   Tobacco Use    Smoking status: Never Smoker    Smokeless tobacco: Never Used   Substance and Sexual Activity    Alcohol use: No    Drug use: No    Sexual activity: Not on file   Other Topics Concern    Not on file   Social History Narrative    Not on file     Social Determinants of Health     Financial Resource Strain:     Difficulty of Paying Living Expenses:    Food Insecurity:     Worried About Running Out of Food in the Last Year:     Ran Out of Food in the Last Year:    Transportation Needs:     Lack of Transportation (Medical):      Lack of Transportation (Non-Medical):    Physical Activity:     Days of Exercise per Week:     Minutes of Exercise per Session:    Stress:     Feeling of Stress :    Social Connections:     Frequency of Communication with Friends and Family:     Frequency of Social Gatherings with Friends and Family:     Attends [employer] Services:     Active Member of Clubs or Organizations:     Attends Club or Organization Meetings:     Marital Status:    Intimate Partner Violence:     Fear of Current or Ex-Partner:     Emotionally Abused:     Physically Abused:     Sexually Abused:        Review of Systems   Review of Systems   Constitutional: Negative for chills and fever. Gastrointestinal: Negative for nausea and vomiting. Musculoskeletal: Positive for joint pain. Objective:     Visit Vitals  BP (!) 129/58 (BP 1 Location: Left arm, BP Patient Position: Sitting, BP Cuff Size: Adult)   Pulse (!) 59   Temp 98.2 °F (36.8 °C) (Temporal)   Resp 18   Ht 5' 1\" (1.549 m)   Wt 190 lb 3.2 oz (86.3 kg)   LMP 01/01/1986   SpO2 96%   BMI 35.94 kg/m²        Physical Exam  Vitals and nursing note reviewed. Constitutional:       Appearance: Normal appearance. HENT:      Head: Normocephalic and atraumatic. Cardiovascular:      Rate and Rhythm: Normal rate and regular rhythm. Pulses: Normal pulses. Heart sounds: Normal heart sounds. Pulmonary:      Effort: Pulmonary effort is normal.      Breath sounds: Normal breath sounds. Abdominal:      General: Abdomen is flat. Bowel sounds are normal.      Palpations: Abdomen is soft. Musculoskeletal:      Cervical back: Normal range of motion and neck supple. Right knee: Crepitus present. No swelling or deformity. Skin:     General: Skin is warm and dry. Neurological:      Mental Status: She is alert. Pertinent Labs/Studies:      Assessment and orders:       ICD-10-CM ICD-9-CM    1. Chronic pain of right knee  M25.561 719.46 XR KNEE RT 3 V    G89.29 338.29    2. Essential hypertension  I10 401.9    3. Obesity, Class II, BMI 35-39.9  E66.9 278.00      Diagnoses and all orders for this visit:    1. Chronic pain of right knee: Suspicion of Osteoarthritis. Will get XR in the future and then consider injection in the knee. Patient is interested in this. -     XR KNEE RT 3 V; Future    2. Essential hypertension    3. Obesity, Class II, BMI 35-39.9: Advised on Wt loss and diet      Follow-up and Dispositions    · Return in about 1 week (around 8/25/2021) for Based on XR, Injection. I have discussed the diagnosis with the patient and the intended plan as seen in the above orders. Social history, medical history, and labs were reviewed. The patient has received an after-visit summary and questions were answered concerning future plans. I have discussed medication side effects and warnings with the patient as well.     MD KRIS Lynne & NAT OHARA [facility]  08/18/21

## 2021-08-18 NOTE — PATIENT INSTRUCTIONS
Weight Loss Diet Guidelines      Eat ONLY when you are hungry, and stop just before you are full. If you are eating enough fat in your meals, you will feel full for 5-6 hours after, and you can avoid snacks. This is your goal.     Eat More of these Foods:    Meat: Beef, lamb, pork, chicken and others  Fish: Any kind of fish  Eggs: As many as you want, with the yolk  Vegetables: ANY vegetables but limit starchy vegetables like potatoes, corn, carrots or peas  Nuts and Seeds: No more than one handful a day  High-fat Dairy: Cheese, butter, heavy cream      Eat Less of this, but OK Rarely:    Fruits: Berries and Melon are best. May have one handful per day. Limit other fruits, particularly bananas, grapes, mangos, and pineapple  DO NOT DRINK ANY JUICE, EVER. Whole grains: Oatmeal, quinoa, brown rice  Legumes: Beans, lentils      Do Not Eat these Foods:    Drinks with calories: Sodas, fruit juices, sweet tea, sports drinks (gatorade, etc)  Sugar: Honey, agave, candy, cake, cookies, ice cream  Grains: Bread, pasta, crackers, cereal, chips  Yogurt: Most yogurt is as bad as candy. Eat only high-fat, plain yogurt, like Fage 2% plain. Trans Fats: \"Hydrogenated\" or \"partially hydrogenated\" oils, margarine  \"Diet\" and \"Low fat\" Products  Highly processed foods. If it looks like it was made in a factory, don't eat it. If it has more than 5 ingredients, it is processed. What Should Meals Look Like? Breakfast: Alicia Meghan, eggs, sausage or plain yogurt with berries  Lunch: Big salad with meat, cheese, avocado, homemade dressing or olive oil and vinegar. Or meat, chicken, fish and vegetables. Dinner: Meat, chicken or fish and vegetables, or salad, like above  Snack: Berries, cheese, nuts  Drinks:Plenty of water.  May also have coffee, tea, or artificially sweetened beverages (but not too many)

## 2021-08-18 NOTE — PROGRESS NOTES
Chief Complaint   Patient presents with    Follow Up Chronic Condition     Visit Vitals  BP (!) 129/58 (BP 1 Location: Left arm, BP Patient Position: Sitting, BP Cuff Size: Adult)   Pulse (!) 59   Temp 98.2 °F (36.8 °C) (Temporal)   Resp 18   Ht 5' 1\" (1.549 m)   Wt 190 lb 3.2 oz (86.3 kg)   LMP 01/01/1986   SpO2 96%   BMI 35.94 kg/m²     1. Have you been to the ER, urgent care clinic since your last visit? Hospitalized since your last visit? No    2. Have you seen or consulted any other health care providers outside of the [address] [place] [address] since your last visit? Include any pap smears or colon screening.  No    Reviewed record in preparation for visit and have necessary documentation  Pt did not bring medication to office visit for review  opportunity was given for questions  Goals that were addressed and/or need to be completed during or after this appointment include   Health Maintenance Due   Topic Date Due    COVID-19 Vaccine (1) Never done    Shingrix Vaccine Age 50> (1 of 2) Never done    Bone Densitometry (Dexa) Screening  Never done

## 2021-09-08 ENCOUNTER — OFFICE VISIT (OUTPATIENT)
Dept: FAMILY MEDICINE CLINIC | Age: 86
End: 2021-09-08
Payer: MEDICARE

## 2021-09-08 VITALS
TEMPERATURE: 98.1 F | HEIGHT: 61 IN | SYSTOLIC BLOOD PRESSURE: 134 MMHG | HEART RATE: 74 BPM | BODY MASS INDEX: 36.14 KG/M2 | WEIGHT: 191.4 LBS | DIASTOLIC BLOOD PRESSURE: 68 MMHG | OXYGEN SATURATION: 96 % | RESPIRATION RATE: 18 BRPM

## 2021-09-08 DIAGNOSIS — M25.561 CHRONIC PAIN OF RIGHT KNEE: Primary | ICD-10-CM

## 2021-09-08 DIAGNOSIS — G89.29 CHRONIC PAIN OF RIGHT KNEE: Primary | ICD-10-CM

## 2021-09-08 DIAGNOSIS — Z23 ENCOUNTER FOR IMMUNIZATION: ICD-10-CM

## 2021-09-08 PROCEDURE — 1101F PT FALLS ASSESS-DOCD LE1/YR: CPT | Performed by: FAMILY MEDICINE

## 2021-09-08 PROCEDURE — G8510 SCR DEP NEG, NO PLAN REQD: HCPCS | Performed by: FAMILY MEDICINE

## 2021-09-08 PROCEDURE — G8417 CALC BMI ABV UP PARAM F/U: HCPCS | Performed by: FAMILY MEDICINE

## 2021-09-08 PROCEDURE — 90694 VACC AIIV4 NO PRSRV 0.5ML IM: CPT | Performed by: FAMILY MEDICINE

## 2021-09-08 PROCEDURE — G8536 NO DOC ELDER MAL SCRN: HCPCS | Performed by: FAMILY MEDICINE

## 2021-09-08 PROCEDURE — 99214 OFFICE O/P EST MOD 30 MIN: CPT | Performed by: FAMILY MEDICINE

## 2021-09-08 PROCEDURE — G0008 ADMIN INFLUENZA VIRUS VAC: HCPCS | Performed by: FAMILY MEDICINE

## 2021-09-08 PROCEDURE — G8427 DOCREV CUR MEDS BY ELIG CLIN: HCPCS | Performed by: FAMILY MEDICINE

## 2021-09-08 PROCEDURE — 1090F PRES/ABSN URINE INCON ASSESS: CPT | Performed by: FAMILY MEDICINE

## 2021-09-08 NOTE — PROGRESS NOTES
[address] [facility]    History of Present Illness:   Rajiv Floyd is a 80 y.o. female with history of HTN, Tubular adenoma, Meningioma, MID, Allergies, Cholelithiasis  CC: Follow up  History provided by patient and Records    HPI:  Knee Pain: Patient notes her pain is much better but noting with walking causes pain, right worse than left. Noting grinding with weight bearing in the right knee. 3/10 intensity pain that comes and goes, sharp in nature. Has been peddling her foot bike. Health Maintenance  Health Maintenance Due   Topic Date Due    COVID-19 Vaccine (1) Never done    Shingrix Vaccine Age 50> (1 of 2) Never done    Bone Densitometry (Dexa) Screening  Never done       Past Medical, Family, and Social History:     Current Outpatient Medications on File Prior to Visit   Medication Sig Dispense Refill    acetaminophen (TYLENOL) 325 mg tablet Take 2 Tablets by mouth every six (6) hours as needed for Pain. 90 Tablet 1    diclofenac (VOLTAREN) 1 % gel Apply 1 g to affected area four (4) times daily. On the left shoulder 350 g 2    carbamide peroxide (DEBROX) 6.5 % otic solution Administer 5 Drops into each ear two (2) times a day. (Patient taking differently: Administer 5 Drops into each ear as needed.) 30 mL 0    cinnamon bark (CINNAMON) 500 mg cap Take  by mouth.  aspirin 81 mg chewable tablet Take 81 mg by mouth daily.  cholecalciferol, VITAMIN D3, (VITAMIN D3) 5,000 unit tab tablet Take  by mouth daily.  sodium chloride (OCEAN) 0.65 % nasal squeeze bottle 0.05 mL by Both Nostrils route as needed for Congestion. (Patient not taking: Reported on 9/8/2021) 44 mL 4     No current facility-administered medications on file prior to visit.        Patient Active Problem List   Diagnosis Code    Allergic rhinitis J30.9    HTN (hypertension) I10    Nephrolithiasis N20.0    Cholelithiasis K80.20    Tubular adenoma D36.9    Melanosis coli K63.89    Meningioma ([address] D32.9  Sensorineural hearing loss of both ears H90.3    Obesity, Class II, BMI 35-39.9 E66.9    BMI 35.0-35.9,adult Z68.35    Actinic keratoses L57.0    Parapelvic renal cyst N28.1       Social History     Socioeconomic History    Marital status:      Spouse name: Not on file    Number of children: Not on file    Years of education: Not on file    Highest education level: Not on file   Occupational History    Not on file   Tobacco Use    Smoking status: Never Smoker    Smokeless tobacco: Never Used   Substance and Sexual Activity    Alcohol use: No    Drug use: No    Sexual activity: Not on file   Other Topics Concern    Not on file   Social History Narrative    Not on file     Social Determinants of Health     Financial Resource Strain:     Difficulty of Paying Living Expenses:    Food Insecurity:     Worried About Running Out of Food in the Last Year:     Ran Out of Food in the Last Year:    Transportation Needs:     Lack of Transportation (Medical):  Lack of Transportation (Non-Medical):    Physical Activity:     Days of Exercise per Week:     Minutes of Exercise per Session:    Stress:     Feeling of Stress :    Social Connections:     Frequency of Communication with Friends and Family:     Frequency of Social Gatherings with Friends and Family:     Attends Protestant Services:     Active Member of Clubs or Organizations:     Attends Club or Organization Meetings:     Marital Status:    Intimate Partner Violence:     Fear of Current or Ex-Partner:     Emotionally Abused:     Physically Abused:     Sexually Abused:        Review of Systems   Review of Systems   Constitutional: Negative for chills and fever. HENT: Negative for congestion. Cardiovascular: Negative for chest pain and palpitations. Gastrointestinal: Negative for nausea and vomiting. Musculoskeletal: Positive for joint pain. Neurological: Negative for dizziness and headaches.        Objective: Visit Vitals  /68 (BP 1 Location: Left arm)   Pulse 74   Temp 98.1 °F (36.7 °C) (Temporal)   Resp 18   Ht 5' 1\" (1.549 m)   Wt 191 lb 6.4 oz (86.8 kg)   LMP 01/01/1986   SpO2 96%   BMI 36.16 kg/m²        Physical Exam  Vitals and nursing note reviewed. Constitutional:       Appearance: Normal appearance. HENT:      Head: Normocephalic and atraumatic. Cardiovascular:      Rate and Rhythm: Normal rate and regular rhythm. Pulses: Normal pulses. Heart sounds: Normal heart sounds. No murmur heard. No friction rub. No gallop. Pulmonary:      Effort: Pulmonary effort is normal.      Breath sounds: Normal breath sounds. Abdominal:      General: Abdomen is flat. Bowel sounds are normal.      Palpations: Abdomen is soft. Musculoskeletal:         General: Normal range of motion. Cervical back: Normal range of motion and neck supple. Skin:     General: Skin is warm and dry. Neurological:      Mental Status: She is alert. Pertinent Labs/Studies:      Assessment and orders:       ICD-10-CM ICD-9-CM    1. Chronic pain of right knee  M25.561 719.46 XR KNEE RT 3 V    G89.29 338.29    2. Encounter for immunization  Z23 V03.89 ADMIN INFLUENZA VIRUS VAC      FLU (FLUAD QUAD INFLUENZA VACCINE,QUAD,ADJUVANTED)     Diagnoses and all orders for this visit:    1. Chronic pain of right knee:Evaluate Arthritis in prepeation for possible injection. Continue Diclofanc gel. -     XR KNEE RT 3 V; Future      Follow-up and Dispositions    · Return if symptoms worsen or fail to improve. I have discussed the diagnosis with the patient and the intended plan as seen in the above orders. Social history, medical history, and labs were reviewed. The patient has received an after-visit summary and questions were answered concerning future plans. I have discussed medication side effects and warnings with the patient as well.     Jonatan Gallardo MD  [facility]  09/08/21

## 2021-09-08 NOTE — PROGRESS NOTES
Chief Complaint   Patient presents with    Knee Pain     Visit Vitals  /68 (BP 1 Location: Left arm)   Pulse 74   Temp 98.1 °F (36.7 °C) (Temporal)   Resp 18   Ht 5' 1\" (1.549 m)   Wt 191 lb 6.4 oz (86.8 kg)   LMP 01/01/1986   SpO2 96%   BMI 36.16 kg/m²     1. Have you been to the ER, urgent care clinic since your last visit? Hospitalized since your last visit? No    2. Have you seen or consulted any other health care providers outside of the [address] [place] [address] since your last visit? Include any pap smears or colon screening.  No    Reviewed record in preparation for visit and have necessary documentation  Pt did not bring medication to office visit for review  opportunity was given for questions  Goals that were addressed and/or need to be completed during or after this appointment include   Health Maintenance Due   Topic Date Due    COVID-19 Vaccine (1) Never done    Shingrix Vaccine Age 50> (1 of 2) Never done    Bone Densitometry (Dexa) Screening  Never done    Flu Vaccine (1) 09/01/2021

## 2021-09-09 ENCOUNTER — TELEPHONE (OUTPATIENT)
Dept: FAMILY MEDICINE CLINIC | Age: 86
End: 2021-09-09

## 2021-09-10 NOTE — TELEPHONE ENCOUNTER
Appreciate the fyi.     MD KRIS Mccormick & NAT OHARA [facility]  09/09/21

## 2021-09-19 NOTE — PROGRESS NOTES
I reviewed with the resident the medical history and the resident's findings on the physical examination. I discussed with the resident the patient's diagnosis and concur with the plan. lethargy

## 2021-11-02 ENCOUNTER — TELEPHONE (OUTPATIENT)
Dept: FAMILY MEDICINE CLINIC | Age: 86
End: 2021-11-02

## 2021-12-16 ENCOUNTER — NURSE TRIAGE (OUTPATIENT)
Dept: OTHER | Facility: CLINIC | Age: 86
End: 2021-12-16

## 2021-12-16 ENCOUNTER — OFFICE VISIT (OUTPATIENT)
Dept: FAMILY MEDICINE CLINIC | Age: 86
End: 2021-12-16
Payer: MEDICARE

## 2021-12-16 VITALS
BODY MASS INDEX: 38.14 KG/M2 | SYSTOLIC BLOOD PRESSURE: 131 MMHG | WEIGHT: 202 LBS | TEMPERATURE: 98.3 F | HEART RATE: 76 BPM | RESPIRATION RATE: 16 BRPM | DIASTOLIC BLOOD PRESSURE: 67 MMHG | HEIGHT: 61 IN | OXYGEN SATURATION: 96 %

## 2021-12-16 DIAGNOSIS — I10 PRIMARY HYPERTENSION: ICD-10-CM

## 2021-12-16 DIAGNOSIS — E55.9 VITAMIN D DEFICIENCY: ICD-10-CM

## 2021-12-16 DIAGNOSIS — R53.81 DEBILITY: Primary | ICD-10-CM

## 2021-12-16 DIAGNOSIS — G89.29 CHRONIC PAIN OF BOTH KNEES: ICD-10-CM

## 2021-12-16 DIAGNOSIS — M25.562 CHRONIC PAIN OF BOTH KNEES: ICD-10-CM

## 2021-12-16 DIAGNOSIS — E66.01 SEVERE OBESITY (BMI 35.0-35.9 WITH COMORBIDITY) (HCC): ICD-10-CM

## 2021-12-16 DIAGNOSIS — R68.89 OTHER GENERAL SYMPTOMS AND SIGNS: ICD-10-CM

## 2021-12-16 DIAGNOSIS — M25.561 CHRONIC PAIN OF BOTH KNEES: ICD-10-CM

## 2021-12-16 PROCEDURE — 99214 OFFICE O/P EST MOD 30 MIN: CPT | Performed by: FAMILY MEDICINE

## 2021-12-16 PROCEDURE — G8510 SCR DEP NEG, NO PLAN REQD: HCPCS | Performed by: FAMILY MEDICINE

## 2021-12-16 PROCEDURE — G8417 CALC BMI ABV UP PARAM F/U: HCPCS | Performed by: FAMILY MEDICINE

## 2021-12-16 PROCEDURE — 1090F PRES/ABSN URINE INCON ASSESS: CPT | Performed by: FAMILY MEDICINE

## 2021-12-16 PROCEDURE — G8536 NO DOC ELDER MAL SCRN: HCPCS | Performed by: FAMILY MEDICINE

## 2021-12-16 PROCEDURE — G8427 DOCREV CUR MEDS BY ELIG CLIN: HCPCS | Performed by: FAMILY MEDICINE

## 2021-12-16 PROCEDURE — 1101F PT FALLS ASSESS-DOCD LE1/YR: CPT | Performed by: FAMILY MEDICINE

## 2021-12-16 NOTE — PROGRESS NOTES
1. Have you been to the ER, urgent care clinic since your last visit? Hospitalized since your last visit? No    2. Have you seen or consulted any other health care providers outside of the [address] [place] [address] since your last visit? Include any pap smears or colon screening. No    Reviewed record in preparation for visit and have necessary documentation  Goals that were addressed and/or need to be completed during or after this appointment include     Health Maintenance Due   Topic Date Due    COVID-19 Vaccine (1) Never done    Shingrix Vaccine Age 50> (1 of 2) Never done    Bone Densitometry (Dexa) Screening  Never done       Patient is accompanied by daughter I have received verbal consent from Mackenzie Acosta to discuss any/all medical information while they are present in the room.

## 2021-12-16 NOTE — PROGRESS NOTES
[address] [facility]    History of Present Illness:   Anat Feliz is a 80 y.o. female with history of HTN, Tubular adenoma, Meningioma, MID, Allergies, Cholelithiasis  CC: Weakness and falls  History provided by patient and daughter of patient and Records    HPI:  Patient reports that over the last couple of weeks she has had multiple falls and increased difficulty walking now. Noting the primary issue is severe knee pains when she is weight bearing. Her knees seem to [employer] with activity and she has fallen. Patient has had some increased confusion possibly and they have been trying to increase fluids. Patient is a fall risk and is benefiting from care from family. They have been continuing to do exercises as well. Health Maintenance  Health Maintenance Due   Topic Date Due    COVID-19 Vaccine (1) Never done    Shingrix Vaccine Age 50> (1 of 2) Never done    Bone Densitometry (Dexa) Screening  Never done       Past Medical, Family, and Social History:     Current Outpatient Medications on File Prior to Visit   Medication Sig Dispense Refill    gluc pemberton/chondro pemberton A/vit C/Mn (GLUCOSAMINE 1500 COMPLEX PO) Take  by mouth.  acetaminophen (TYLENOL) 325 mg tablet Take 2 Tablets by mouth every six (6) hours as needed for Pain. 90 Tablet 1    diclofenac (VOLTAREN) 1 % gel Apply 1 g to affected area four (4) times daily. On the left shoulder 350 g 2    cinnamon bark (CINNAMON) 500 mg cap Take  by mouth.  aspirin 81 mg chewable tablet Take 81 mg by mouth daily.  cholecalciferol, VITAMIN D3, (VITAMIN D3) 5,000 unit tab tablet Take 1,000 Units by mouth daily.  carbamide peroxide (DEBROX) 6.5 % otic solution Administer 5 Drops into each ear two (2) times a day. (Patient taking differently: Administer 5 Drops into each ear as needed.) 30 mL 0    sodium chloride (OCEAN) 0.65 % nasal squeeze bottle 0.05 mL by Both Nostrils route as needed for Congestion.  (Patient not taking: Reported on 9/8/2021) 44 mL 4     No current facility-administered medications on file prior to visit. Patient Active Problem List   Diagnosis Code    Allergic rhinitis J30.9    HTN (hypertension) I10    Nephrolithiasis N20.0    Cholelithiasis K80.20    Tubular adenoma D36.9    Melanosis coli K63.89    Meningioma ([address] D32.9    Sensorineural hearing loss of both ears H90.3    Obesity, Class II, BMI 35-39.9 E66.9    BMI 35.0-35.9,adult Z68.35    Actinic keratoses L57.0    Parapelvic renal cyst N28.1       Social History     Socioeconomic History    Marital status:      Spouse name: Not on file    Number of children: Not on file    Years of education: Not on file    Highest education level: Not on file   Occupational History    Not on file   Tobacco Use    Smoking status: Never Smoker    Smokeless tobacco: Never Used   Substance and Sexual Activity    Alcohol use: No    Drug use: No    Sexual activity: Not on file   Other Topics Concern    Not on file   Social History Narrative    Not on file     Social Determinants of Health     Financial Resource Strain:     Difficulty of Paying Living Expenses: Not on file   Food Insecurity:     Worried About Running Out of Food in the Last Year: Not on file    April of Food in the Last Year: Not on file   Transportation Needs:     Lack of Transportation (Medical): Not on file    Lack of Transportation (Non-Medical):  Not on file   Physical Activity:     Days of Exercise per Week: Not on file    Minutes of Exercise per Session: Not on file   Stress:     Feeling of Stress : Not on file   Social Connections:     Frequency of Communication with Friends and Family: Not on file    Frequency of Social Gatherings with Friends and Family: Not on file    Attends Nondenominational Services: Not on file    Active Member of Clubs or Organizations: Not on file    Attends Club or Organization Meetings: Not on file    Marital Status: Not on file Intimate Partner Violence:     Fear of Current or Ex-Partner: Not on file    Emotionally Abused: Not on file    Physically Abused: Not on file    Sexually Abused: Not on file   Housing Stability:     Unable to Pay for Housing in the Last Year: Not on file    Number of [place] in the Last Year: Not on file    Unstable Housing in the Last Year: Not on file       Review of Systems   Review of Systems   Constitutional: Negative for chills and fever. HENT: Negative for congestion. Respiratory: Positive for cough. Negative for hemoptysis. Gastrointestinal: Negative for heartburn and nausea. Musculoskeletal: Positive for joint pain. Objective:     Visit Vitals  /67 (BP 1 Location: Left upper arm, BP Patient Position: Sitting, BP Cuff Size: Adult)   Pulse 76   Temp 98.3 °F (36.8 °C) (Oral)   Resp 16   Ht 5' 1\" (1.549 m)   Wt 202 lb (91.6 kg)   LMP 01/01/1986   SpO2 96%   BMI 38.17 kg/m²        Physical Exam  Vitals and nursing note reviewed. Constitutional:       Appearance: Normal appearance. HENT:      Head: Normocephalic and atraumatic. Cardiovascular:      Rate and Rhythm: Normal rate and regular rhythm. Pulses: Normal pulses. Heart sounds: Normal heart sounds. No murmur heard. No friction rub. No gallop. Pulmonary:      Effort: Pulmonary effort is normal.      Breath sounds: Normal breath sounds. Abdominal:      General: Abdomen is flat. Bowel sounds are normal.      Palpations: Abdomen is soft. Musculoskeletal:      Cervical back: Normal range of motion and neck supple. Comments: Wheelchair bound. Knees mild tenderness bilaterally. Neurological:      Mental Status: She is alert. Pertinent Labs/Studies:      Assessment and orders:       ICD-10-CM ICD-9-CM    1. Debility  R53.81 799.3 VITAMIN D, 25 HYDROXY      VITAMIN B12 & FOLATE   2. Primary hypertension  I10 401.9 CBC W/O DIFF      METABOLIC PANEL, COMPREHENSIVE   3.  Chronic pain of both knees  M25.561 719.46     M25.562 338.29     G89.29     4. Severe obesity (BMI 35.0-35.9 with comorbidity) ([facility])  E66.01 278.01     Z68.35 V85.35    5. Vitamin D deficiency  E55.9 268.9 VITAMIN D, 25 HYDROXY   6. Other general symptoms and signs   R68.89 780.99 VITAMIN B12 & FOLATE     Diagnoses and all orders for this visit:    1. Debility: patient would benefit from transfer board for transfers from wheelchair to bed. She is a fall risk with walking/standing. Patient has assistance at home that can help with transfers. 2. Primary hypertension  -     CBC W/O DIFF; Future  -     METABOLIC PANEL, COMPREHENSIVE; Future    3. Chronic pain of both knees    4. Severe obesity (BMI 35.0-35.9 with comorbidity) ([address]      Follow-up and Dispositions    · Return in about 3 months (around 3/16/2022). I have discussed the diagnosis with the patient and the intended plan as seen in the above orders. Social history, medical history, and labs were reviewed. The patient has received an after-visit summary and questions were answered concerning future plans. I have discussed medication side effects and warnings with the patient as well.     MD KRIS Zuniga & NAT OHARA [facility]  12/16/21

## 2021-12-17 LAB
25(OH)D3 SERPL-MCNC: 71.2 NG/ML (ref 30–100)
ALBUMIN SERPL-MCNC: 3.5 G/DL (ref 3.5–5)
ALBUMIN/GLOB SERPL: 1 {RATIO} (ref 1.1–2.2)
ALP SERPL-CCNC: 68 U/L (ref 45–117)
ALT SERPL-CCNC: 31 U/L (ref 12–78)
ANION GAP SERPL CALC-SCNC: 4 MMOL/L (ref 5–15)
AST SERPL-CCNC: 28 U/L (ref 15–37)
BILIRUB SERPL-MCNC: 0.2 MG/DL (ref 0.2–1)
BUN SERPL-MCNC: 17 MG/DL (ref 6–20)
BUN/CREAT SERPL: 19 (ref 12–20)
CALCIUM SERPL-MCNC: 9.6 MG/DL (ref 8.5–10.1)
CHLORIDE SERPL-SCNC: 109 MMOL/L (ref 97–108)
CO2 SERPL-SCNC: 27 MMOL/L (ref 21–32)
CREAT SERPL-MCNC: 0.9 MG/DL (ref 0.55–1.02)
ERYTHROCYTE [DISTWIDTH] IN BLOOD BY AUTOMATED COUNT: 13 % (ref 11.5–14.5)
FOLATE SERPL-MCNC: 58.2 NG/ML (ref 5–21)
GLOBULIN SER CALC-MCNC: 3.4 G/DL (ref 2–4)
GLUCOSE SERPL-MCNC: 105 MG/DL (ref 65–100)
HCT VFR BLD AUTO: 35.9 % (ref 35–47)
HGB BLD-MCNC: 11.5 G/DL (ref 11.5–16)
MCH RBC QN AUTO: 32.7 PG (ref 26–34)
MCHC RBC AUTO-ENTMCNC: 32 G/DL (ref 30–36.5)
MCV RBC AUTO: 102 FL (ref 80–99)
NRBC # BLD: 0 K/UL (ref 0–0.01)
NRBC BLD-RTO: 0 PER 100 WBC
PLATELET # BLD AUTO: 246 K/UL (ref 150–400)
PMV BLD AUTO: 10.5 FL (ref 8.9–12.9)
POTASSIUM SERPL-SCNC: 4.3 MMOL/L (ref 3.5–5.1)
PROT SERPL-MCNC: 6.9 G/DL (ref 6.4–8.2)
RBC # BLD AUTO: 3.52 M/UL (ref 3.8–5.2)
SODIUM SERPL-SCNC: 140 MMOL/L (ref 136–145)
VIT B12 SERPL-MCNC: 1051 PG/ML (ref 193–986)
WBC # BLD AUTO: 3.9 K/UL (ref 3.6–11)

## 2022-01-10 ENCOUNTER — TELEPHONE (OUTPATIENT)
Dept: FAMILY MEDICINE CLINIC | Age: 87
End: 2022-01-10

## 2022-01-10 DIAGNOSIS — M19.90 ARTHRITIS PAIN: ICD-10-CM

## 2022-01-10 RX ORDER — DICLOFENAC SODIUM 10 MG/G
1 GEL TOPICAL 4 TIMES DAILY
Qty: 350 G | Refills: 2 | Status: SHIPPED | OUTPATIENT
Start: 2022-01-10 | End: 2022-04-25 | Stop reason: SDUPTHER

## 2022-01-11 ENCOUNTER — TELEPHONE (OUTPATIENT)
Dept: FAMILY MEDICINE CLINIC | Age: 87
End: 2022-01-11

## 2022-01-11 NOTE — TELEPHONE ENCOUNTER
[facility] to check on the status. Rx >$50. Son called and made aware that medication is OTC and would be cheaper to to buy out of pocket vs bill insurance. He verbalized understanding.

## 2022-01-11 NOTE — TELEPHONE ENCOUNTER
Pt's son states that the Pharmacy still has not received Pt's Rx. He is there now and they do not have the Rx in their system. He is asking that the Dr resend the Rx so that he can pick it up for Pt.

## 2022-03-16 ENCOUNTER — OFFICE VISIT (OUTPATIENT)
Dept: FAMILY MEDICINE CLINIC | Age: 87
End: 2022-03-16
Payer: MEDICARE

## 2022-03-16 VITALS
BODY MASS INDEX: 38.14 KG/M2 | RESPIRATION RATE: 20 BRPM | SYSTOLIC BLOOD PRESSURE: 131 MMHG | DIASTOLIC BLOOD PRESSURE: 71 MMHG | OXYGEN SATURATION: 96 % | TEMPERATURE: 97.7 F | HEART RATE: 86 BPM | WEIGHT: 202 LBS | HEIGHT: 61 IN

## 2022-03-16 DIAGNOSIS — M25.562 CHRONIC PAIN OF BOTH KNEES: ICD-10-CM

## 2022-03-16 DIAGNOSIS — R53.81 DEBILITY: ICD-10-CM

## 2022-03-16 DIAGNOSIS — E55.9 VITAMIN D DEFICIENCY: ICD-10-CM

## 2022-03-16 DIAGNOSIS — M25.561 CHRONIC PAIN OF BOTH KNEES: ICD-10-CM

## 2022-03-16 DIAGNOSIS — G89.29 CHRONIC PAIN OF BOTH KNEES: ICD-10-CM

## 2022-03-16 DIAGNOSIS — D32.9 MENINGIOMA (HCC): ICD-10-CM

## 2022-03-16 DIAGNOSIS — E66.01 SEVERE OBESITY (BMI 35.0-39.9) WITH COMORBIDITY (HCC): ICD-10-CM

## 2022-03-16 DIAGNOSIS — M25.511 ACUTE PAIN OF RIGHT SHOULDER: ICD-10-CM

## 2022-03-16 DIAGNOSIS — I10 PRIMARY HYPERTENSION: Primary | ICD-10-CM

## 2022-03-16 DIAGNOSIS — I10 PRIMARY HYPERTENSION: ICD-10-CM

## 2022-03-16 PROBLEM — Z86.718 HISTORY OF DVT (DEEP VEIN THROMBOSIS): Chronic | Status: ACTIVE | Noted: 2022-03-16

## 2022-03-16 PROCEDURE — 99213 OFFICE O/P EST LOW 20 MIN: CPT | Performed by: FAMILY MEDICINE

## 2022-03-16 PROCEDURE — 1090F PRES/ABSN URINE INCON ASSESS: CPT | Performed by: FAMILY MEDICINE

## 2022-03-16 PROCEDURE — G8536 NO DOC ELDER MAL SCRN: HCPCS | Performed by: FAMILY MEDICINE

## 2022-03-16 PROCEDURE — G8417 CALC BMI ABV UP PARAM F/U: HCPCS | Performed by: FAMILY MEDICINE

## 2022-03-16 PROCEDURE — G8427 DOCREV CUR MEDS BY ELIG CLIN: HCPCS | Performed by: FAMILY MEDICINE

## 2022-03-16 PROCEDURE — 1101F PT FALLS ASSESS-DOCD LE1/YR: CPT | Performed by: FAMILY MEDICINE

## 2022-03-16 PROCEDURE — G8510 SCR DEP NEG, NO PLAN REQD: HCPCS | Performed by: FAMILY MEDICINE

## 2022-03-16 NOTE — PROGRESS NOTES
[address] [facility]    History of Present Illness:   Alexia Jose is a 80 y.o. female with history of HTN, Tubular adenoma, Meningioma, MID, Allergies, Cholelithiasis  CC: Follow up, Weakness and falls  History provided by patient and Records    HPI:  Per patient and son of patient she has becoming more debilitated, she can barely walk at all, she \"shuffles\" a little for transfers. Patient states her legs are \"tirde\" and she is essentially wheelchair or bed bound. Patient notes that she has no energy. In the past she had severe knee pains. Patient has been noting some issues with memory. Can stick to a schedule easily though. Down trending since end of therapy. Meningioma: No changes at this time. Health Maintenance  Health Maintenance Due   Topic Date Due    COVID-19 Vaccine (1) Never done    Shingrix Vaccine Age 50> (1 of 2) Never done    Bone Densitometry (Dexa) Screening  Never done       Past Medical, Family, and Social History:     Current Outpatient Medications on File Prior to Visit   Medication Sig Dispense Refill    diclofenac (VOLTAREN) 1 % gel Apply 1 g to affected area four (4) times daily. On the left shoulder 350 g 2    gluc pemberton/chondro pemberton A/vit C/Mn (GLUCOSAMINE 1500 COMPLEX PO) Take  by mouth.  acetaminophen (TYLENOL) 325 mg tablet Take 2 Tablets by mouth every six (6) hours as needed for Pain. 90 Tablet 1    cinnamon bark (CINNAMON) 500 mg cap Take  by mouth.  aspirin 81 mg chewable tablet Take 81 mg by mouth daily.  cholecalciferol, VITAMIN D3, (VITAMIN D3) 5,000 unit tab tablet Take 1,000 Units by mouth daily.  carbamide peroxide (DEBROX) 6.5 % otic solution Administer 5 Drops into each ear two (2) times a day. (Patient not taking: Reported on 3/16/2022) 30 mL 0    sodium chloride (OCEAN) 0.65 % nasal squeeze bottle 0.05 mL by Both Nostrils route as needed for Congestion.  (Patient not taking: Reported on 9/8/2021) 44 mL 4     No current facility-administered medications on file prior to visit. Patient Active Problem List   Diagnosis Code    Allergic rhinitis J30.9    HTN (hypertension) I10    Nephrolithiasis N20.0    Cholelithiasis K80.20    Tubular adenoma D36.9    Melanosis coli K63.89    Meningioma ([address] D32.9    Sensorineural hearing loss of both ears H90.3    Obesity, Class II, BMI 35-39.9 E66.9    BMI 35.0-35.9,adult Z68.35    Actinic keratoses L57.0    Parapelvic renal cyst N28.1    History of DVT (deep vein thrombosis) Z86.718       Social History     Socioeconomic History    Marital status:    Tobacco Use    Smoking status: Never Smoker    Smokeless tobacco: Never Used   Substance and Sexual Activity    Alcohol use: No    Drug use: No        Review of Systems   Review of Systems   Constitutional: Negative for chills and fever. HENT: Negative for congestion. Respiratory: Negative for cough. Cardiovascular: Negative for chest pain and palpitations. Gastrointestinal: Negative for heartburn and nausea. Neurological: Negative for dizziness and headaches. Objective:     Visit Vitals  BP (!) 143/68   Pulse 86   Temp 97.7 °F (36.5 °C)   Resp 20   Ht 5' 1\" (1.549 m)   Wt 202 lb (91.6 kg)   LMP 01/01/1986   SpO2 96%   BMI 38.17 kg/m²        Physical Exam  Vitals and nursing note reviewed. Constitutional:       Appearance: Normal appearance. HENT:      Head: Normocephalic and atraumatic. Cardiovascular:      Rate and Rhythm: Normal rate and regular rhythm. Pulses: Normal pulses. Heart sounds: Normal heart sounds. No murmur heard. No friction rub. No gallop. Pulmonary:      Effort: Pulmonary effort is normal.      Breath sounds: Normal breath sounds. Abdominal:      General: Abdomen is flat. Bowel sounds are normal.      Palpations: Abdomen is soft. Musculoskeletal:      Cervical back: Normal range of motion and neck supple. Comments: 4/5 strength in the LE BIlaterally. Neurological:      Mental Status: She is alert. Pertinent Labs/Studies:      Assessment and orders:       ICD-10-CM ICD-9-CM    1. Primary hypertension  I10 401.9 CBC W/O DIFF      METABOLIC PANEL, COMPREHENSIVE   2. Debility  R53.81 799.3 REFERRAL TO HOME HEALTH   3. Vitamin D deficiency  E55.9 268.9 VITAMIN D, 25 HYDROXY   4. Chronic pain of both knees  M25.561 719.46     M25.562 338.29     G89.29     5. Severe obesity (BMI 35.0-39. 9) with comorbidity ([address]  E66.01 278.01    6. Meningioma (HCC)  D32.9 225.2    7. Acute pain of right shoulder  M25.511 719.41 REFERRAL TO HOME HEALTH     Diagnoses and all orders for this visit:    1. Primary hypertension: Our goal is to normalize the blood pressure to decrease the risks of strokes and heart attacks. The patient is in agreement with the plan. -     CBC W/O DIFF; Future  -     METABOLIC PANEL, COMPREHENSIVE; Future    2. Debility: Difficulty ambulating, is a fall risk currently  -     REFERRAL TO HOME HEALTH    3. Vitamin D deficiency  -     VITAMIN D, 25 HYDROXY; Future    4. Chronic pain of both knees    5. Severe obesity (BMI 35.0-39. 9) with comorbidity ([address]    6. Meningioma (HCC):b Appears stable. 7. Acute pain of right shoulder  -     REFERRAL TO HOME HEALTH      Follow-up and Dispositions    · Return in about 3 months (around 6/16/2022). I have discussed the diagnosis with the patient and the intended plan as seen in the above orders. Social history, medical history, and labs were reviewed. The patient has received an after-visit summary and questions were answered concerning future plans. I have discussed medication side effects and warnings with the patient as well.     MD KRIS Johnson & NAT OHARA [facility]  03/16/22

## 2022-03-16 NOTE — PROGRESS NOTES
1. Have you been to the ER, urgent care clinic since your last visit? Hospitalized since your last visit? No    2. Have you seen or consulted any other health care providers outside of the [address] [place] [address] since your last visit? Include any pap smears or colon screening. No  Reviewed record in preparation for visit and have necessary documentation  Pt did not bring medication to office visit for review  opportunity was given for questions      3. For patients aged 39-70: Has the patient had a colonoscopy / FIT/ Cologuard? NA - based on age      If the patient is female:    4. For patients aged 41-77: Has the patient had a mammogram within the past 2 years? NA - based on age or sex      11. For patients aged 21-65: Has the patient had a pap smear?  NA - based on age or sex      Goals that were addressed and/or need to be completed during or after this appointment include   Health Maintenance Due   Topic Date Due    COVID-19 Vaccine (1) Never done    Shingrix Vaccine Age 50> (1 of 2) Never done    Bone Densitometry (Dexa) Screening  Never done

## 2022-03-17 LAB
25(OH)D3 SERPL-MCNC: 72.7 NG/ML (ref 30–100)
ALBUMIN SERPL-MCNC: 3.3 G/DL (ref 3.5–5)
ALBUMIN/GLOB SERPL: 0.9 {RATIO} (ref 1.1–2.2)
ALP SERPL-CCNC: 64 U/L (ref 45–117)
ALT SERPL-CCNC: 24 U/L (ref 12–78)
ANION GAP SERPL CALC-SCNC: 3 MMOL/L (ref 5–15)
AST SERPL-CCNC: 15 U/L (ref 15–37)
BILIRUB SERPL-MCNC: 0.1 MG/DL (ref 0.2–1)
BUN SERPL-MCNC: 17 MG/DL (ref 6–20)
BUN/CREAT SERPL: 20 (ref 12–20)
CALCIUM SERPL-MCNC: 9.9 MG/DL (ref 8.5–10.1)
CHLORIDE SERPL-SCNC: 108 MMOL/L (ref 97–108)
CO2 SERPL-SCNC: 27 MMOL/L (ref 21–32)
CREAT SERPL-MCNC: 0.83 MG/DL (ref 0.55–1.02)
ERYTHROCYTE [DISTWIDTH] IN BLOOD BY AUTOMATED COUNT: 13.2 % (ref 11.5–14.5)
GLOBULIN SER CALC-MCNC: 3.7 G/DL (ref 2–4)
GLUCOSE SERPL-MCNC: 138 MG/DL (ref 65–100)
HCT VFR BLD AUTO: 37.9 % (ref 35–47)
HGB BLD-MCNC: 12.1 G/DL (ref 11.5–16)
MCH RBC QN AUTO: 32.2 PG (ref 26–34)
MCHC RBC AUTO-ENTMCNC: 31.9 G/DL (ref 30–36.5)
MCV RBC AUTO: 100.8 FL (ref 80–99)
NRBC # BLD: 0 K/UL (ref 0–0.01)
NRBC BLD-RTO: 0 PER 100 WBC
PLATELET # BLD AUTO: 285 K/UL (ref 150–400)
PMV BLD AUTO: 10.7 FL (ref 8.9–12.9)
POTASSIUM SERPL-SCNC: 4.4 MMOL/L (ref 3.5–5.1)
PROT SERPL-MCNC: 7 G/DL (ref 6.4–8.2)
RBC # BLD AUTO: 3.76 M/UL (ref 3.8–5.2)
SODIUM SERPL-SCNC: 138 MMOL/L (ref 136–145)
WBC # BLD AUTO: 6.8 K/UL (ref 3.6–11)

## 2022-03-18 PROBLEM — N28.1 PARAPELVIC RENAL CYST: Status: ACTIVE | Noted: 2017-10-30

## 2022-03-24 PROBLEM — Z86.718 HISTORY OF DVT (DEEP VEIN THROMBOSIS): Status: ACTIVE | Noted: 2022-03-16

## 2022-04-06 ENCOUNTER — TELEPHONE (OUTPATIENT)
Dept: FAMILY MEDICINE CLINIC | Age: 87
End: 2022-04-06

## 2022-04-06 NOTE — TELEPHONE ENCOUNTER
Attempted to call, no answer, no voicemail. Dr. Anirudh Agee is in patient care and can not call patient. What is the patient wanting to discuss and a message will be sent.

## 2022-04-06 NOTE — TELEPHONE ENCOUNTER
----- Message from Priyank Mir sent at 4/5/2022  3:51 PM EDT -----  Subject: Message to Provider    QUESTIONS  Information for Provider? Patient is asking that pcp Jony Curran   give a call back to over some of the things that were talked about with   the home health nurse. Call back pt at soonest availability   ---------------------------------------------------------------------------  --------------  [address]  What is the best way for the office to contact you? OK to leave message on   voicemail  Preferred Call Back Phone Number? [phone]  ---------------------------------------------------------------------------  --------------  SCRIPT ANSWERS  Relationship to Patient?  Self

## 2022-04-07 ENCOUNTER — TELEPHONE (OUTPATIENT)
Dept: FAMILY MEDICINE CLINIC | Age: 87
End: 2022-04-07

## 2022-04-07 NOTE — TELEPHONE ENCOUNTER
PT's son states Mikaela Nurse said to ask if Dr can give her something stronger for the pain in her legs. Also that a referral for otho for therapy may help.

## 2022-04-07 NOTE — TELEPHONE ENCOUNTER
We will need to have an appt to discuss different medications, particularly if we need to start something controlled.

## 2022-04-13 ENCOUNTER — OFFICE VISIT (OUTPATIENT)
Dept: FAMILY MEDICINE CLINIC | Age: 87
End: 2022-04-13
Payer: MEDICARE

## 2022-04-13 VITALS
RESPIRATION RATE: 18 BRPM | TEMPERATURE: 99.2 F | HEART RATE: 86 BPM | WEIGHT: 203 LBS | DIASTOLIC BLOOD PRESSURE: 72 MMHG | BODY MASS INDEX: 38.33 KG/M2 | OXYGEN SATURATION: 95 % | HEIGHT: 61 IN | SYSTOLIC BLOOD PRESSURE: 134 MMHG

## 2022-04-13 DIAGNOSIS — G89.29 CHRONIC PAIN OF BOTH KNEES: Primary | ICD-10-CM

## 2022-04-13 DIAGNOSIS — M25.561 CHRONIC PAIN OF BOTH KNEES: Primary | ICD-10-CM

## 2022-04-13 DIAGNOSIS — M25.562 CHRONIC PAIN OF BOTH KNEES: Primary | ICD-10-CM

## 2022-04-13 DIAGNOSIS — M25.511 ACUTE PAIN OF RIGHT SHOULDER: ICD-10-CM

## 2022-04-13 DIAGNOSIS — R53.81 DEBILITY: ICD-10-CM

## 2022-04-13 PROCEDURE — G8510 SCR DEP NEG, NO PLAN REQD: HCPCS | Performed by: FAMILY MEDICINE

## 2022-04-13 PROCEDURE — G8427 DOCREV CUR MEDS BY ELIG CLIN: HCPCS | Performed by: FAMILY MEDICINE

## 2022-04-13 PROCEDURE — 1101F PT FALLS ASSESS-DOCD LE1/YR: CPT | Performed by: FAMILY MEDICINE

## 2022-04-13 PROCEDURE — 1090F PRES/ABSN URINE INCON ASSESS: CPT | Performed by: FAMILY MEDICINE

## 2022-04-13 PROCEDURE — 99214 OFFICE O/P EST MOD 30 MIN: CPT | Performed by: FAMILY MEDICINE

## 2022-04-13 PROCEDURE — G8417 CALC BMI ABV UP PARAM F/U: HCPCS | Performed by: FAMILY MEDICINE

## 2022-04-13 PROCEDURE — G8536 NO DOC ELDER MAL SCRN: HCPCS | Performed by: FAMILY MEDICINE

## 2022-04-13 RX ORDER — DICLOFENAC SODIUM 50 MG/1
50 TABLET, DELAYED RELEASE ORAL 2 TIMES DAILY
Qty: 60 TABLET | Refills: 1 | Status: SHIPPED | OUTPATIENT
Start: 2022-04-13 | End: 2022-10-24

## 2022-04-13 RX ORDER — OMEGA-3 FATTY ACIDS/FISH OIL 300-500 MG
300 CAPSULE ORAL
COMMUNITY

## 2022-04-13 RX ORDER — CHOLECALCIFEROL (VITAMIN D3) 125 MCG
100 CAPSULE ORAL DAILY
COMMUNITY

## 2022-04-13 RX ORDER — ASCORBIC ACID 500 MG
500 TABLET ORAL
COMMUNITY

## 2022-04-13 RX ORDER — GUAIFENESIN AND PHENYLEPHRINE HCL 400; 10 MG/1; MG/1
TABLET ORAL
COMMUNITY
End: 2022-10-24

## 2022-04-13 NOTE — PROGRESS NOTES
1. Have you been to the ER, urgent care clinic since your last visit? Hospitalized since your last visit? No    2. Have you seen or consulted any other health care providers outside of the [address] [place] [address] since your last visit? Including any pap smears or colon screening.  No      Health Maintenance Due   Topic Date Due    COVID-19 Vaccine (1) Never done    Shingrix Vaccine Age 50> (1 of 2) Never done    Bone Densitometry (Dexa) Screening  Never done

## 2022-04-13 NOTE — PROGRESS NOTES
[facility]    History of Present Illness:   Aashish Mir is a 80 y.o. female with history of HTN, Tubular adenoma, Meningioma, MID, Allergies, Cholelithiasis  CC: Follow up, Weakness  History provided by patient and Records    HPI:  Per son of patient she is having issues with pain with Physical therapy due to knee pains in particular. States her legs are \"Tired\" and while trying to do Physical therapy has significant difficulty. Patient stated she feels okay overall. Is getting therapy about 2 times weekly. Health Maintenance  Health Maintenance Due   Topic Date Due    COVID-19 Vaccine (1) Never done    Shingrix Vaccine Age 50> (1 of 2) Never done    Bone Densitometry (Dexa) Screening  Never done       Past Medical, Family, and Social History:     Current Outpatient Medications on File Prior to Visit   Medication Sig Dispense Refill    fish oil-omega-3 fatty acids (Fish OiL) 300-500 mg cap Take 300 mg/day by mouth.  cranberry xt/multivitamin (CRANBERRY EXTRACT-MULTIVITAMIN PO) Take  by mouth.  mv-mn/folic PZTR/[id number] (DIABETIC SUPPORT FORMULA PO) Take  by mouth.  co-enzyme Q-10 (Co Q-10) 100 mg capsule Take 100 mg by mouth daily.  turmeric root extract 500 mg cap Take  by mouth.  Iron Fum & P-FA-Vit B & C No.9 125 mg iron- 1 mg cap Take  by mouth.  ascorbic acid, vitamin C, (Vitamin C) 500 mg tablet Take 500 mg by mouth.  diclofenac (VOLTAREN) 1 % gel Apply 1 g to affected area four (4) times daily. On the left shoulder 350 g 2    gluc pemberton/chondro pemberton A/vit C/Mn (GLUCOSAMINE 1500 COMPLEX PO) Take  by mouth.  acetaminophen (TYLENOL) 325 mg tablet Take 2 Tablets by mouth every six (6) hours as needed for Pain. 90 Tablet 1    cinnamon bark (CINNAMON) 500 mg cap Take  by mouth.  aspirin 81 mg chewable tablet Take 81 mg by mouth daily.       cholecalciferol, VITAMIN D3, (VITAMIN D3) 5,000 unit tab tablet Take 1,000 Units by mouth daily.      carbamide peroxide (DEBROX) 6.5 % otic solution Administer 5 Drops into each ear two (2) times a day. (Patient not taking: Reported on 3/16/2022) 30 mL 0    sodium chloride (OCEAN) 0.65 % nasal squeeze bottle 0.05 mL by Both Nostrils route as needed for Congestion. (Patient not taking: Reported on 9/8/2021) 44 mL 4     No current facility-administered medications on file prior to visit. Patient Active Problem List   Diagnosis Code    Allergic rhinitis J30.9    HTN (hypertension) I10    Nephrolithiasis N20.0    Cholelithiasis K80.20    Tubular adenoma D36.9    Melanosis coli K63.89    Meningioma ([address] D32.9    Sensorineural hearing loss of both ears H90.3    Obesity, Class II, BMI 35-39.9 E66.9    BMI 35.0-35.9,adult Z68.35    Actinic keratoses L57.0    Parapelvic renal cyst N28.1    History of DVT (deep vein thrombosis) Z86.718    Chronic pain of both knees M25.561, M25.562, G89.29    Acute pain of right shoulder M25.511       Social History     Socioeconomic History    Marital status:    Tobacco Use    Smoking status: Never Smoker    Smokeless tobacco: Never Used   Substance and Sexual Activity    Alcohol use: No    Drug use: No        Review of Systems   Review of Systems   Constitutional: Negative for chills and fever. Eyes: Negative for blurred vision and double vision. Cardiovascular: Negative for chest pain and palpitations. Musculoskeletal: Positive for joint pain. Objective:     Visit Vitals  /72 (BP 1 Location: Right arm, BP Patient Position: Sitting, BP Cuff Size: Adult)   Pulse 86   Temp 99.2 °F (37.3 °C) (Oral)   Resp 18   Ht 5' 1\" (1.549 m)   Wt 203 lb (92.1 kg)   LMP 01/01/1986   SpO2 95%   BMI 38.36 kg/m²        Physical Exam  Vitals and nursing note reviewed. Constitutional:       Appearance: Normal appearance. HENT:      Head: Normocephalic and atraumatic. Cardiovascular:      Rate and Rhythm: Normal rate and regular rhythm. Pulses: Normal pulses. Heart sounds: Normal heart sounds. Pulmonary:      Effort: Pulmonary effort is normal.      Breath sounds: Normal breath sounds. Abdominal:      General: Abdomen is flat. Bowel sounds are normal.      Palpations: Abdomen is soft. Musculoskeletal:      Cervical back: Normal range of motion and neck supple. Comments: Right knee pain   Skin:     General: Skin is warm and dry. Neurological:      Mental Status: She is alert. Pertinent Labs/Studies:      Assessment and orders:       ICD-10-CM ICD-9-CM    1. Chronic pain of both knees  M25.561 719.46 diclofenac EC (VOLTAREN) 50 mg EC tablet    M25.562 338.29     G89.29     2. Acute pain of right shoulder  M25.511 719.41    3. Debility  R53.81 799.3      Diagnoses and all orders for this visit:    1. Chronic pain of both knees  -     diclofenac EC (VOLTAREN) 50 mg EC tablet; Take 1 Tablet by mouth two (2) times a day. 2. Acute pain of right shoulder    3. Debility      Follow-up and Dispositions    · Return in about 2 weeks (around 4/27/2022) for Procedure visist Knee injection/Shoulder injection. .           I have discussed the diagnosis with the patient and the intended plan as seen in the above orders. Social history, medical history, and labs were reviewed. The patient has received an after-visit summary and questions were answered concerning future plans. I have discussed medication side effects and warnings with the patient as well.     MD KRIS Herndon & NAT OHARA [facility]  04/13/22

## 2022-04-25 DIAGNOSIS — M19.90 ARTHRITIS PAIN: ICD-10-CM

## 2022-04-25 RX ORDER — DICLOFENAC SODIUM 10 MG/G
1 GEL TOPICAL 4 TIMES DAILY
Qty: 350 G | Refills: 2 | Status: SHIPPED | OUTPATIENT
Start: 2022-04-25 | End: 2022-05-24 | Stop reason: SDUPTHER

## 2022-04-27 ENCOUNTER — OFFICE VISIT (OUTPATIENT)
Dept: FAMILY MEDICINE CLINIC | Age: 87
End: 2022-04-27
Payer: MEDICARE

## 2022-04-27 VITALS
BODY MASS INDEX: 38.33 KG/M2 | RESPIRATION RATE: 20 BRPM | SYSTOLIC BLOOD PRESSURE: 139 MMHG | WEIGHT: 203 LBS | OXYGEN SATURATION: 99 % | TEMPERATURE: 97.5 F | DIASTOLIC BLOOD PRESSURE: 70 MMHG | HEIGHT: 61 IN | HEART RATE: 92 BPM

## 2022-04-27 DIAGNOSIS — M17.12 PRIMARY OSTEOARTHRITIS OF LEFT KNEE: ICD-10-CM

## 2022-04-27 DIAGNOSIS — M17.11 PRIMARY OSTEOARTHRITIS OF RIGHT KNEE: Primary | ICD-10-CM

## 2022-04-27 DIAGNOSIS — M19.011 PRIMARY OSTEOARTHRITIS OF RIGHT SHOULDER: ICD-10-CM

## 2022-04-27 PROCEDURE — 20610 DRAIN/INJ JOINT/BURSA W/O US: CPT | Performed by: FAMILY MEDICINE

## 2022-04-27 RX ORDER — TRIAMCINOLONE ACETONIDE 40 MG/ML
40 INJECTION, SUSPENSION INTRA-ARTICULAR; INTRAMUSCULAR ONCE
Qty: 1 ML | Refills: 0
Start: 2022-04-27 | End: 2022-04-27

## 2022-04-27 NOTE — PROGRESS NOTES
1. Have you been to the ER, urgent care clinic since your last visit? Hospitalized since your last visit? No    2. Have you seen or consulted any other health care providers outside of the [address] [place] [address] since your last visit? Include any pap smears or colon screening. No  Reviewed record in preparation for visit and have necessary documentation  Pt did not bring medication to office visit for review  opportunity was given for questions      3. For patients aged 39-70: Has the patient had a colonoscopy / FIT/ Cologuard? NA - based on age      If the patient is female:    4. For patients aged 41-77: Has the patient had a mammogram within the past 2 years? NA - based on age or sex      11. For patients aged 21-65: Has the patient had a pap smear?  NA - based on age or sex      Goals that were addressed and/or need to be completed during or after this appointment include   Health Maintenance Due   Topic Date Due    COVID-19 Vaccine (1) Never done    Shingrix Vaccine Age 50> (1 of 2) Never done    Bone Densitometry (Dexa) Screening  Never done    DTaP/Tdap/Td series (2 - Td or Tdap) 04/22/2022

## 2022-04-27 NOTE — PROGRESS NOTES
Clinic Procedure Note:    Procedure performed: Steroid injection of the Right knee and Left knee    Indications: Symptom relief from osteoarthritis    Date of procedure: 04/27/22    Chart reviewed for the following:              Yohannes Johnson MD, have reviewed the History, Physical and updated the Allergic reactions for [address] performed immediately prior to start of procedure:              I, Silver Corado MD, have performed the [facility] to the start of the procedure, see attached form in media. Procedure:  After consent was obtained, using sterile technique the left and right knee joints was prepped using Betadine. Local anesthetic used: None. A syringes with Kenalog 40 mg was mixed with 2% lidocaine 1 ml and  0.5% marcaine 1 ml and injected into the right and left knee joints each via the lateral approach bilaterally and the needle withdrawn. The procedure was well tolerated. The patient is asked to continue to rest the joint for a few more days before resuming regular activities. It may be more painful for the first 1-2 days. Watch for fever, or increased swelling or persistent pain in the joint. Call or return to clinic prn if such symptoms occur or there is failure to improve as anticipated. Procedure performed by:   Silver Corado MD  Provider assisted by: Sheron Essex LPN   Patient assisted by: self and son     How tolerated by patient: tolerated the procedure well with no complications    Comments: none    Silver Corado MD  Resident KRIS VALDIVIA & NAT OHARA [facility]

## 2022-04-27 NOTE — PROGRESS NOTES
Clinic Procedure Note:    Procedure performed: Steroid injection of the Right Shoulder    Indications: Symptom relief from osteoarthritis    Date of procedure: 04/27/22    Chart reviewed for the following:              Jose Oppenheim, MD, have reviewed the History, Physical and updated the Allergic reactions for [address] performed immediately prior to start of procedure:              I, Jessica Lutz MD, have performed the following reviews on Emy Garrett prior to the start of the procedure, see attached form in media. Procedure:  After consent was obtained, using sterile technique the Right Shoulder bursa was prepped using Betadine. Local anesthetic used: None. Kenalog 40 mg was mixed with 2% lidocaine 1 ml and  0.5% marcaine 1 ml and injected into the Right Shoulder joint via the posterior approach and the needle withdrawn. The procedure was well tolerated. The patient is asked to continue to rest the joint for a few more days before resuming regular activities. It may be more painful for the first 1-2 days. Watch for fever, or increased swelling or persistent pain in the joint. Call or return to clinic prn if such symptoms occur or there is failure to improve as anticipated. Procedure performed by:   Jessica Lutz MD  Provider assisted by: Claudia Smith LPN   Patient assisted by: son     How tolerated by patient: tolerated the procedure well with no complications    Comments: none    Jessica Lutz MD  Resident KRIS VALDIVIA & NAT OHARA [facility]

## 2022-04-28 ENCOUNTER — TELEPHONE (OUTPATIENT)
Dept: FAMILY MEDICINE CLINIC | Age: 87
End: 2022-04-28

## 2022-04-28 NOTE — TELEPHONE ENCOUNTER
Zane for the update.     MD KRIS Dumont & NAT OHARA [facility]  04/28/22

## 2022-05-24 DIAGNOSIS — M19.90 ARTHRITIS PAIN: ICD-10-CM

## 2022-05-24 RX ORDER — DICLOFENAC SODIUM 10 MG/G
1 GEL TOPICAL 4 TIMES DAILY
Qty: 350 G | Refills: 2 | Status: SHIPPED | OUTPATIENT
Start: 2022-05-24

## 2022-06-15 ENCOUNTER — OFFICE VISIT (OUTPATIENT)
Dept: FAMILY MEDICINE CLINIC | Age: 87
End: 2022-06-15
Payer: MEDICARE

## 2022-06-15 VITALS
TEMPERATURE: 98.3 F | BODY MASS INDEX: 34.78 KG/M2 | HEART RATE: 90 BPM | RESPIRATION RATE: 16 BRPM | WEIGHT: 184.2 LBS | SYSTOLIC BLOOD PRESSURE: 119 MMHG | DIASTOLIC BLOOD PRESSURE: 74 MMHG | OXYGEN SATURATION: 96 % | HEIGHT: 61 IN

## 2022-06-15 DIAGNOSIS — K59.00 CONSTIPATION, UNSPECIFIED CONSTIPATION TYPE: Primary | ICD-10-CM

## 2022-06-15 DIAGNOSIS — R53.81 DEBILITY: ICD-10-CM

## 2022-06-15 DIAGNOSIS — M25.562 CHRONIC PAIN OF BOTH KNEES: ICD-10-CM

## 2022-06-15 DIAGNOSIS — G89.29 CHRONIC PAIN OF BOTH KNEES: ICD-10-CM

## 2022-06-15 DIAGNOSIS — M25.561 CHRONIC PAIN OF BOTH KNEES: ICD-10-CM

## 2022-06-15 PROCEDURE — 99214 OFFICE O/P EST MOD 30 MIN: CPT | Performed by: FAMILY MEDICINE

## 2022-06-15 PROCEDURE — 1123F ACP DISCUSS/DSCN MKR DOCD: CPT | Performed by: FAMILY MEDICINE

## 2022-06-15 PROCEDURE — G8510 SCR DEP NEG, NO PLAN REQD: HCPCS | Performed by: FAMILY MEDICINE

## 2022-06-15 PROCEDURE — 1101F PT FALLS ASSESS-DOCD LE1/YR: CPT | Performed by: FAMILY MEDICINE

## 2022-06-15 PROCEDURE — 1090F PRES/ABSN URINE INCON ASSESS: CPT | Performed by: FAMILY MEDICINE

## 2022-06-15 PROCEDURE — G8427 DOCREV CUR MEDS BY ELIG CLIN: HCPCS | Performed by: FAMILY MEDICINE

## 2022-06-15 PROCEDURE — G8536 NO DOC ELDER MAL SCRN: HCPCS | Performed by: FAMILY MEDICINE

## 2022-06-15 PROCEDURE — G8417 CALC BMI ABV UP PARAM F/U: HCPCS | Performed by: FAMILY MEDICINE

## 2022-06-15 RX ORDER — LACTULOSE 10 G/15ML
20 SOLUTION ORAL; RECTAL 2 TIMES DAILY
Qty: 237 ML | Refills: 1 | Status: SHIPPED | OUTPATIENT
Start: 2022-06-15 | End: 2022-06-22 | Stop reason: SDUPTHER

## 2022-06-15 NOTE — PROGRESS NOTES
1. Have you been to the ER, urgent care clinic since your last visit? Hospitalized since your last visit? No    2. Have you seen or consulted any other health care providers outside of the [address] [place] [address] since your last visit? Include any pap smears or colon screening. No    3. For patients aged 39-70: Has the patient had a colonoscopy / FIT/ Cologuard? NA - based on age    If the patient is female:    4. For patients aged 41-77: Has the patient had a mammogram within the past 2 years? NA - based on age or sex      11. For patients aged 21-65: Has the patient had a pap smear? NA - based on age or sex     Reviewed record in preparation for visit and have necessary documentation  Pt did not bring medication to office visit for review  Patient is accompanied by son I have received verbal consent from Irvin Lewis to discuss any/all medical information while they are present in the room.     Goals that were addressed and/or need to be completed during or after this appointment include     Health Maintenance Due   Topic Date Due    COVID-19 Vaccine (1) Never done    Shingrix Vaccine Age 50> (1 of 2) Never done    Bone Densitometry (Dexa) Screening  Never done    DTaP/Tdap/Td series (2 - Td or Tdap) 04/22/2022    Medicare Yearly Exam  06/15/2022

## 2022-06-20 NOTE — PROGRESS NOTES
Progress Note    Patient: Sulma Hayes MRN: [id number]  SSN: [id number]    YOB: 1934  Age: 80 y.o. Sex: female        Chief Complaint   Patient presents with    Follow-up     3 month      she is a 80y.o. year old female new to me who presents with son with c/o constipation. Patient in wheel chair. She says she is to weak to stand. She has hx of chronic b/l knee pain. She has been taking an OTC medication for her constipation. Son says she has been eating beans and fruit daily. There has been weight loss. Patient takes multiple dietary supplements. BP Readings from Last 3 Encounters:   06/15/22 119/74   04/27/22 139/70   04/13/22 134/72     Wt Readings from Last 3 Encounters:   06/15/22 184 lb 3.2 oz (83.6 kg)   04/27/22 203 lb (92.1 kg)   04/13/22 203 lb (92.1 kg)     Body mass index is 34.8 kg/m². Lab Results   Component Value Date/Time    WBC 6.8 03/16/2022 03:03 PM    HGB 12.1 03/16/2022 03:03 PM    Hemoglobin (POC) 13.2 08/27/2012 10:22 AM    HCT 37.9 03/16/2022 03:03 PM    PLATELET 014 59/34/4263 03:03 PM    .8 (H) 03/16/2022 03:03 PM     Lab Results   Component Value Date/Time    TSH 2.36 07/16/2021 04:10 PM    T4, Free 1.34 05/12/2014 10:24 AM      Lab Results   Component Value Date/Time    Sodium 138 03/16/2022 03:03 PM    Potassium 4.4 03/16/2022 03:03 PM    Chloride 108 03/16/2022 03:03 PM    CO2 27 03/16/2022 03:03 PM    Anion gap 3 (L) 03/16/2022 03:03 PM    Glucose 138 (H) 03/16/2022 03:03 PM    BUN 17 03/16/2022 03:03 PM    Creatinine 0.83 03/16/2022 03:03 PM    BUN/Creatinine ratio 20 03/16/2022 03:03 PM    GFR est AA >60 03/16/2022 03:03 PM    GFR est non-AA >60 03/16/2022 03:03 PM    Calcium 9.9 03/16/2022 03:03 PM    Bilirubin, total 0.1 (L) 03/16/2022 03:03 PM    ALT (SGPT) 24 03/16/2022 03:03 PM    Alk.  phosphatase 64 03/16/2022 03:03 PM    Protein, total 7.0 03/16/2022 03:03 PM    Albumin 3.3 (L) 03/16/2022 03:03 PM    Globulin 3.7 03/16/2022 03:03 PM    A-G Ratio 0.9 (L) 03/16/2022 03:03 PM          Encounter Diagnoses   Name Primary?  Constipation, unspecified constipation type Yes    Chronic pain of both knees     Debility        Patient Active Problem List   Diagnosis Code    Allergic rhinitis J30.9    HTN (hypertension) I10    Nephrolithiasis N20.0    Cholelithiasis K80.20    Tubular adenoma D36.9    Melanosis coli K63.89    Meningioma ([address] D32.9    Sensorineural hearing loss of both ears H90.3    Obesity, Class II, BMI 35-39.9 E66.9    BMI 35.0-35.9,adult Z68.35    Actinic keratoses L57.0    Parapelvic renal cyst N28.1    History of DVT (deep vein thrombosis) Z86.718    Chronic pain of both knees M25.561, M25.562, G89.29    Acute pain of right shoulder M25.511     Past Surgical History:   Procedure Laterality Date    WA ABDOMEN SURGERY PROC UNLISTED       Social History     Socioeconomic History    Marital status:      Spouse name: Not on file    Number of children: Not on file    Years of education: Not on file    Highest education level: Not on file   Occupational History    Not on file   Tobacco Use    Smoking status: Never Smoker    Smokeless tobacco: Never Used   Substance and Sexual Activity    Alcohol use: No    Drug use: No    Sexual activity: Not on file   Other Topics Concern    Not on file   Social History Narrative    Not on file     Social Determinants of Health     Financial Resource Strain:     Difficulty of Paying Living Expenses: Not on file   Food Insecurity:     Worried About Running Out of Food in the Last Year: Not on file    April of Food in the Last Year: Not on file   Transportation Needs:     Lack of Transportation (Medical): Not on file    Lack of Transportation (Non-Medical):  Not on file   Physical Activity:     Days of Exercise per Week: Not on file    Minutes of Exercise per Session: Not on file   Stress:     Feeling of Stress : Not on file   Social Connections:     Frequency of Communication with Friends and Family: Not on file    Frequency of Social Gatherings with Friends and Family: Not on file    Attends Latter day Services: Not on file    Active Member of Clubs or Organizations: Not on file    Attends Club or Organization Meetings: Not on file    Marital Status: Not on file   Intimate Partner Violence:     Fear of Current or Ex-Partner: Not on file    Emotionally Abused: Not on file    Physically Abused: Not on file    Sexually Abused: Not on file   Housing Stability:     Unable to Pay for Housing in the Last Year: Not on file    Number of [place] in the Last Year: Not on file    Unstable Housing in the Last Year: Not on file     Family History   Problem Relation Age of Onset    Cancer Mother [de-identified]        breast cancer    Alcohol abuse Father      Current Outpatient Medications   Medication Sig    lactulose (CHRONULAC) 10 gram/15 mL solution Take 30 mL by mouth two (2) times a day.  diclofenac (VOLTAREN) 1 % gel Apply 1 g to affected area four (4) times daily. On the left shoulder    fish oil-omega-3 fatty acids (Fish OiL) 300-500 mg cap Take 300 mg/day by mouth.  cranberry xt/multivitamin (CRANBERRY EXTRACT-MULTIVITAMIN PO) Take  by mouth.  mv-mn/folic FLMA/EPTDBO/KHV646 (DIABETIC SUPPORT FORMULA PO) Take  by mouth.  co-enzyme Q-10 (Co Q-10) 100 mg capsule Take 100 mg by mouth daily.  turmeric root extract 500 mg cap Take  by mouth.  Iron Fum & P-FA-Vit B & C No.9 125 mg iron- 1 mg cap Take  by mouth.  ascorbic acid, vitamin C, (Vitamin C) 500 mg tablet Take 500 mg by mouth.  diclofenac EC (VOLTAREN) 50 mg EC tablet Take 1 Tablet by mouth two (2) times a day.  gluc pemberton/chondro pemberton A/vit C/Mn (GLUCOSAMINE 1500 COMPLEX PO) Take  by mouth.  acetaminophen (TYLENOL) 325 mg tablet Take 2 Tablets by mouth every six (6) hours as needed for Pain.  cinnamon bark (CINNAMON) 500 mg cap Take  by mouth.     aspirin 81 mg chewable tablet Take 81 mg by mouth daily.  cholecalciferol, VITAMIN D3, (VITAMIN D3) 5,000 unit tab tablet Take 1,000 Units by mouth daily.  sodium chloride (OCEAN) 0.65 % nasal squeeze bottle 0.05 mL by Both Nostrils route as needed for Congestion. (Patient not taking: Reported on 6/15/2022)     No current facility-administered medications for this visit. No Known Allergies    Review of Systems:  Constitutional: Negative for fatigue, malaise  Resp: Negative for cough, wheezing or SOB  CV: Negative for chest pain, dizziness or palpitations  GI: see HPI  MS: see HPI   Neuro: Negative for HA, weakness or paresthesia  Psych: Negative for depression or anxiety     Vitals:    06/15/22 1322   BP: 119/74   Pulse: 90   Resp: 16   Temp: 98.3 °F (36.8 °C)   TempSrc: Oral   SpO2: 96%   Weight: 184 lb 3.2 oz (83.6 kg)   Height: 5' 1\" (1.549 m)       Physical Examination:  General: Well developed, well nourished, in no acute distress  Head: Normocephalic, atraumatic  Eyes: Sclera clear, EOMI  Neck: Normal range of motion  Respiratory: Symmetrical, unlabored effort  Cardiovascular: Regular rate and rhythm  Extremities: confined to wheel chair  Neurologic: No focal deficits  Psych: Active, alert and oriented. Flat affect       ICD-10-CM ICD-9-CM    1. Constipation, unspecified constipation type  K59.00 564.00 lactulose (CHRONULAC) 10 gram/15 mL solution   2. Chronic pain of both knees  M25.561 719.46     M25.562 338.29     G89.29     3. Debility  R53.81 799.3        Plan of care:  Diagnoses were discussed in detail with patient. Medication risks/benefits/side effects discussed with patient. All of the patient's questions were addressed and answered to apparent satisfaction. The patient understands and agrees with our plan of care. The patient knows to call back if they have questions about the plan of care or if symptoms change. The patient received an After-Visit Summary which contains VS, diagnoses, orders, allergy and medication lists. Future Appointments   Date Time Provider Kenyatta Vega   9/15/2022  9:40 AM Norah Mckeon MD [id number]

## 2022-06-22 DIAGNOSIS — K59.00 CONSTIPATION, UNSPECIFIED CONSTIPATION TYPE: ICD-10-CM

## 2022-06-22 RX ORDER — LACTULOSE 10 G/15ML
20 SOLUTION ORAL; RECTAL 2 TIMES DAILY
Qty: 237 ML | Refills: 1 | Status: SHIPPED | OUTPATIENT
Start: 2022-06-22 | End: 2022-07-11 | Stop reason: SDUPTHER

## 2022-07-11 DIAGNOSIS — K59.00 CONSTIPATION, UNSPECIFIED CONSTIPATION TYPE: ICD-10-CM

## 2022-07-11 RX ORDER — LACTULOSE 10 G/15ML
20 SOLUTION ORAL; RECTAL 2 TIMES DAILY
Qty: 237 ML | Refills: 1 | Status: SHIPPED | OUTPATIENT
Start: 2022-07-11 | End: 2022-07-28 | Stop reason: SDUPTHER

## 2022-07-28 DIAGNOSIS — K59.00 CONSTIPATION, UNSPECIFIED CONSTIPATION TYPE: ICD-10-CM

## 2022-07-29 RX ORDER — LACTULOSE 10 G/15ML
20 SOLUTION ORAL; RECTAL 2 TIMES DAILY
Qty: 5400 ML | Refills: 1 | Status: SHIPPED | OUTPATIENT
Start: 2022-07-29

## 2022-09-15 ENCOUNTER — OFFICE VISIT (OUTPATIENT)
Dept: FAMILY MEDICINE CLINIC | Age: 87
End: 2022-09-15
Payer: MEDICARE

## 2022-09-15 VITALS
HEIGHT: 61 IN | OXYGEN SATURATION: 99 % | WEIGHT: 230 LBS | DIASTOLIC BLOOD PRESSURE: 66 MMHG | RESPIRATION RATE: 16 BRPM | BODY MASS INDEX: 43.43 KG/M2 | HEART RATE: 71 BPM | SYSTOLIC BLOOD PRESSURE: 130 MMHG | TEMPERATURE: 97.4 F

## 2022-09-15 DIAGNOSIS — Z00.00 MEDICARE ANNUAL WELLNESS VISIT, SUBSEQUENT: Primary | ICD-10-CM

## 2022-09-15 DIAGNOSIS — E66.9 OBESITY, CLASS II, BMI 35-39.9: ICD-10-CM

## 2022-09-15 DIAGNOSIS — I10 PRIMARY HYPERTENSION: Chronic | ICD-10-CM

## 2022-09-15 DIAGNOSIS — Z23 ENCOUNTER FOR IMMUNIZATION: ICD-10-CM

## 2022-09-15 DIAGNOSIS — M25.562 CHRONIC PAIN OF BOTH KNEES: ICD-10-CM

## 2022-09-15 DIAGNOSIS — M25.561 CHRONIC PAIN OF BOTH KNEES: ICD-10-CM

## 2022-09-15 DIAGNOSIS — G89.29 CHRONIC PAIN OF BOTH KNEES: ICD-10-CM

## 2022-09-15 DIAGNOSIS — R53.81 DEBILITY: ICD-10-CM

## 2022-09-15 DIAGNOSIS — R73.9 ELEVATED BLOOD SUGAR: ICD-10-CM

## 2022-09-15 PROCEDURE — G8417 CALC BMI ABV UP PARAM F/U: HCPCS | Performed by: FAMILY MEDICINE

## 2022-09-15 PROCEDURE — G8427 DOCREV CUR MEDS BY ELIG CLIN: HCPCS | Performed by: FAMILY MEDICINE

## 2022-09-15 PROCEDURE — G8510 SCR DEP NEG, NO PLAN REQD: HCPCS | Performed by: FAMILY MEDICINE

## 2022-09-15 PROCEDURE — G0439 PPPS, SUBSEQ VISIT: HCPCS | Performed by: FAMILY MEDICINE

## 2022-09-15 PROCEDURE — 99213 OFFICE O/P EST LOW 20 MIN: CPT | Performed by: FAMILY MEDICINE

## 2022-09-15 PROCEDURE — 1101F PT FALLS ASSESS-DOCD LE1/YR: CPT | Performed by: FAMILY MEDICINE

## 2022-09-15 PROCEDURE — G8536 NO DOC ELDER MAL SCRN: HCPCS | Performed by: FAMILY MEDICINE

## 2022-09-15 PROCEDURE — 1123F ACP DISCUSS/DSCN MKR DOCD: CPT | Performed by: FAMILY MEDICINE

## 2022-09-15 PROCEDURE — 90694 VACC AIIV4 NO PRSRV 0.5ML IM: CPT | Performed by: FAMILY MEDICINE

## 2022-09-15 PROCEDURE — 1090F PRES/ABSN URINE INCON ASSESS: CPT | Performed by: FAMILY MEDICINE

## 2022-09-15 PROCEDURE — G0008 ADMIN INFLUENZA VIRUS VAC: HCPCS | Performed by: FAMILY MEDICINE

## 2022-09-15 NOTE — PROGRESS NOTES
Chief Complaint   Patient presents with    Follow Up Chronic Condition     1. \"Have you been to the ER, urgent care clinic since your last visit? Hospitalized since your last visit? \" No    2. \"Have you seen or consulted any other health care providers outside of the [address] [place] [address] since your last visit? \" No     3. For patients aged 39-70: Has the patient had a colonoscopy / FIT/ Cologuard? NA - based on age      If the patient is female:    4. For patients aged 41-77: Has the patient had a mammogram within the past 2 years? NA - based on age or sex      11. For patients aged 21-65: Has the patient had a pap smear?  NA - based on age or sex    Health Maintenance Due   Topic Date Due    COVID-19 Vaccine (1) Never done    Shingrix Vaccine Age 50> (1 of 2) Never done    Bone Densitometry (Dexa) Screening  Never done    DTaP/Tdap/Td series (2 - Td or Tdap) 04/22/2022    Medicare Yearly Exam  06/15/2022    Pneumococcal 65+ years (2 - PPSV23 or PCV20) 06/16/2022    Flu Vaccine (1) 09/01/2022

## 2022-09-15 NOTE — PROGRESS NOTES
[address] [facility] [place]    History of Present Illness:   Philip Skaggs is a 80 y.o. female with history of HTN, Tubular adenoma, Meningioma, MID, Allergies, Cholelithiasis  CC: Follow up, Weakness  History provided by patient and Records    HPI:  Patient Son provides most history. Noting worsening issues with standing and patient has pain in nears whenever she tries to stand, or pedal on exercise bicycle as well. Patient states she feels fine, but per son shoulder are also still bothering her despite recent steroid injections. Health Maintenance  Health Maintenance Due   Topic Date Due    COVID-19 Vaccine (1) Never done    Shingrix Vaccine Age 50> (1 of 2) Never done    Bone Densitometry (Dexa) Screening  Never done    DTaP/Tdap/Td series (2 - Td or Tdap) 04/22/2022    Pneumococcal 65+ years (2 - PPSV23 or PCV20) 06/16/2022       Past Medical, Family, and Social History:     Current Outpatient Medications on File Prior to Visit   Medication Sig Dispense Refill    lactulose (CHRONULAC) 10 gram/15 mL solution Take 30 mL by mouth two (2) times a day. 5400 mL 1    diclofenac (VOLTAREN) 1 % gel Apply 1 g to affected area four (4) times daily. On the left shoulder 350 g 2    fish oil-omega-3 fatty acids (Fish OiL) 300-500 mg cap Take 300 mg/day by mouth.      cranberry xt/multivitamin (CRANBERRY EXTRACT-MULTIVITAMIN PO) Take  by mouth. mv-mn/folic [id number] (DIABETIC SUPPORT FORMULA PO) Take  by mouth. co-enzyme Q-10 (CO Q-10) 100 mg capsule Take 100 mg by mouth daily. turmeric root extract 500 mg cap Take  by mouth. Iron Fum & P-FA-Vit B & C No.9 125 mg iron- 1 mg cap Take  by mouth. ascorbic acid, vitamin C, (VITAMIN C) 500 mg tablet Take 500 mg by mouth.      gluc pemberton/chondro pemberton A/vit C/Mn (GLUCOSAMINE 1500 COMPLEX PO) Take  by mouth. acetaminophen (TYLENOL) 325 mg tablet Take 2 Tablets by mouth every six (6) hours as needed for Pain.  90 Tablet 1    cinnamon bark 500 mg cap Take  by mouth. aspirin 81 mg chewable tablet Take 81 mg by mouth daily. cholecalciferol, VITAMIN D3, (VITAMIN D3) 5,000 unit tab tablet Take 1,000 Units by mouth daily. diclofenac EC (VOLTAREN) 50 mg EC tablet Take 1 Tablet by mouth two (2) times a day. (Patient not taking: Reported on 9/15/2022) 60 Tablet 1    sodium chloride (OCEAN) 0.65 % nasal squeeze bottle 0.05 mL by Both Nostrils route as needed for Congestion. (Patient not taking: No sig reported) 44 mL 4     No current facility-administered medications on file prior to visit. Patient Active Problem List   Diagnosis Code    Allergic rhinitis J30.9    HTN (hypertension) I10    Nephrolithiasis N20.0    Cholelithiasis K80.20    Tubular adenoma D36.9    Melanosis coli K63.89    Meningioma ([address] D32.9    Sensorineural hearing loss of both ears H90.3    Obesity, Class II, BMI 35-39.9 E66.9    BMI 35.0-35.9,adult Z68.35    Actinic keratoses L57.0    Parapelvic renal cyst N28.1    History of DVT (deep vein thrombosis) Z86.718    Chronic pain of both knees M25.561, M25.562, G89.29    Acute pain of right shoulder M25.511       Social History     Socioeconomic History    Marital status:    Tobacco Use    Smoking status: Never    Smokeless tobacco: Never   Substance and Sexual Activity    Alcohol use: No    Drug use: No        Review of Systems   Review of Systems   Constitutional:  Negative for chills and fever. Gastrointestinal:  Negative for abdominal pain, heartburn, nausea and vomiting. Musculoskeletal:  Positive for joint pain. Neurological:  Negative for dizziness and headaches. Objective:   Visit Vitals  /66 (BP 1 Location: Left lower arm, BP Patient Position: Sitting, BP Cuff Size: Adult)   Pulse 71   Temp 97.4 °F (36.3 °C) (Oral)   Resp 16   Ht 5' 1\" (1.549 m)   Wt 230 lb (104.3 kg)   LMP 01/01/1986   SpO2 99%   BMI 43.46 kg/m²        Physical Exam  Vitals and nursing note reviewed.    Constitutional: Appearance: Normal appearance. Cardiovascular:      Rate and Rhythm: Normal rate and regular rhythm. Pulses: Normal pulses. Heart sounds: Normal heart sounds. No murmur heard. No friction rub. No gallop. Pulmonary:      Effort: Pulmonary effort is normal.      Breath sounds: Normal breath sounds. Abdominal:      General: Abdomen is flat. Bowel sounds are normal.      Palpations: Abdomen is soft. Musculoskeletal:      Cervical back: Normal range of motion and neck supple. Comments: Wheel chair bound   Skin:     General: Skin is warm and dry. Neurological:      Mental Status: She is alert. Mental status is at baseline. Pertinent Labs/Studies:      Assessment and orders:       ICD-10-CM ICD-9-CM    1. Medicare annual wellness visit, subsequent  Z00.00 V70.0       2. Encounter for immunization  Z23 V03.89 INFLUENZA, FLUAD, (AGE 72 Y+), IM, PF, 0.5 ML      ADMIN INFLUENZA VIRUS VAC      3. Primary hypertension  I10 401.9       4. Obesity, Class II, BMI 35-39.9  E66.9 278.00       5. Chronic pain of both knees  M25.561 719.46     M25.562 338.29     G89.29        6. Debility  R53.81 799.3       7. Elevated blood sugar  R73.9 790.29 HEMOGLOBIN A1C WITH EAG        Diagnoses and all orders for this visit:    1. Medicare annual wellness visit, subsequent    2. Encounter for immunization  -     INFLUENZA, FLUAD, (AGE 65 Y+), IM, PF, 0.5 ML  -     ADMIN INFLUENZA VIRUS VAC    3. Primary hypertension    4. Obesity, Class II, BMI 35-39.9    5. Chronic pain of both knees    6. Debility    7. Elevated blood sugar  -     HEMOGLOBIN A1C WITH EAG; Future    Follow-up and Dispositions    Return in about 4 months (around 1/15/2023), or if symptoms worsen or fail to improve. I have discussed the diagnosis with the patient and the intended plan as seen in the above orders. Social history, medical history, and labs were reviewed.   The patient has received an after-visit summary and questions were answered concerning future plans. I have discussed medication side effects and warnings with the patient as well.     MD KRIS Gomez & NAT OHARA [facility]  09/15/22

## 2022-09-15 NOTE — PROGRESS NOTES
This is the Subsequent Medicare Annual Wellness Exam, performed 12 months or more after the Initial AWV or the last Subsequent AWV    I have reviewed the patient's medical history in detail and updated the computerized patient record. History     Past Medical History:   Diagnosis Date    Hypertension     IBS (irritable bowel syndrome)     Meningioma (HCC)     Tubular adenoma 10/2/2012      Past Surgical History:   Procedure Laterality Date    ND ABDOMEN SURGERY PROC UNLISTED       No Known Allergies  Family History   Problem Relation Age of Onset    Cancer Mother [de-identified]        breast cancer    Alcohol abuse Father      Social History     Tobacco Use    Smoking status: Never    Smokeless tobacco: Never   Substance Use Topics    Alcohol use: No     Depression Risk Factor Screening:     3 most recent PHQ Screens 9/15/2022   Little interest or pleasure in doing things Not at all   Feeling down, depressed, irritable, or hopeless Not at all   Total Score PHQ 2 0     Alcohol Risk Factor Screening:    Do you average more than 1 drink per night or more than 7 drinks a week: No    In the past three months have you have had more than 4 drinks containing alcohol on one occasion: No  Functional Ability and Level of Safety:   Hearing Loss  The patient needs further evaluation. Activities of Daily Living  The home contains: no safety equipment. Patient needs help with:  phone, transportation, shopping, preparing meals, laundry, housework, managing medications, managing money, dressing, bathing, hygiene, bathroom needs, and walking      Ambulation: wheelchair bound    Fall Risk  Fall Risk Assessment, last 12 mths 9/15/2022   Able to walk? No   Fall in past 12 months? 1   Do you feel unsteady? 1   Are you worried about falling 0   Is TUG test greater than 12 seconds? 0   Is the gait abnormal? 0   Number of falls in past 12 months 2   Fall with injury?  0       Abuse Screen  Abuse Screening Questionnaire 12/16/2021   Do you ever feel afraid of your partner? N   Are you in a relationship with someone who physically or mentally threatens you? N   Is it safe for you to go home? Y     Cognitive Screening   Evaluation of Cognitive Function:  Has your family/caregiver stated any concerns about your memory: yes - Known Loss  Abnormal, MMSE  No flowsheet data found. Patient Care Team   Patient Care Team:  Abad Corona MD as PCP - General (Family Medicine)  Abad Corona MD as PCP - [facility] Empaneled Provider  Twan Saab MD (Neurosurgery)  Assessment/Plan   Education and counseling provided:  Are appropriate based on today's review and evaluation  End-of-Life planning (with patient's consent)    Diagnoses and all orders for this visit:    1. Primary hypertension: Our goal is to normalize the blood pressure to decrease the risks of strokes and heart attacks. The patient is in agreement with the plan. 2. Encounter for immunization  -     INFLUENZA, FLUAD, (AGE 65 Y+), IM, PF, 0.5 ML  -     ADMIN INFLUENZA VIRUS VAC    3. Obesity, Class II, BMI 35-39.9    4. Chronic pain of both knees    5. Debility    6. Elevated blood sugar  -     HEMOGLOBIN A1C WITH EAG;  Future      Health Maintenance Topics with due status: Overdue       Topic Date Due    COVID-19 Vaccine Never done    Shingrix Vaccine Age 50> Never done    Bone Densitometry (Dexa) Screening Never done    DTaP/Tdap/Td series 04/22/2022    Medicare Yearly Exam 06/15/2022    Pneumococcal 65+ years 06/16/2022     Health Maintenance Topics with due status: Not Due       Topic Last Completion Date    Depression Screen 09/15/2022     Health Maintenance Topics with due status: Completed       Topic Last Completion Date    Flu Vaccine 09/15/2022

## 2022-09-16 LAB
EST. AVERAGE GLUCOSE BLD GHB EST-MCNC: 146 MG/DL
HBA1C MFR BLD: 6.7 % (ref 4–5.6)

## 2022-10-03 ENCOUNTER — OFFICE VISIT (OUTPATIENT)
Dept: FAMILY MEDICINE CLINIC | Age: 87
End: 2022-10-03
Payer: MEDICARE

## 2022-10-03 VITALS
HEART RATE: 76 BPM | DIASTOLIC BLOOD PRESSURE: 85 MMHG | SYSTOLIC BLOOD PRESSURE: 161 MMHG | RESPIRATION RATE: 18 BRPM | BODY MASS INDEX: 44.18 KG/M2 | OXYGEN SATURATION: 96 % | HEIGHT: 61 IN | TEMPERATURE: 98.5 F | WEIGHT: 234 LBS

## 2022-10-03 DIAGNOSIS — L02.91 ABSCESS: Primary | ICD-10-CM

## 2022-10-03 DIAGNOSIS — M79.89 LEG SWELLING: ICD-10-CM

## 2022-10-03 DIAGNOSIS — I10 PRIMARY HYPERTENSION: ICD-10-CM

## 2022-10-03 PROCEDURE — G8427 DOCREV CUR MEDS BY ELIG CLIN: HCPCS | Performed by: FAMILY MEDICINE

## 2022-10-03 PROCEDURE — G8510 SCR DEP NEG, NO PLAN REQD: HCPCS | Performed by: FAMILY MEDICINE

## 2022-10-03 PROCEDURE — G8417 CALC BMI ABV UP PARAM F/U: HCPCS | Performed by: FAMILY MEDICINE

## 2022-10-03 PROCEDURE — 99214 OFFICE O/P EST MOD 30 MIN: CPT | Performed by: FAMILY MEDICINE

## 2022-10-03 PROCEDURE — 1090F PRES/ABSN URINE INCON ASSESS: CPT | Performed by: FAMILY MEDICINE

## 2022-10-03 PROCEDURE — G8536 NO DOC ELDER MAL SCRN: HCPCS | Performed by: FAMILY MEDICINE

## 2022-10-03 PROCEDURE — 1101F PT FALLS ASSESS-DOCD LE1/YR: CPT | Performed by: FAMILY MEDICINE

## 2022-10-03 PROCEDURE — 1123F ACP DISCUSS/DSCN MKR DOCD: CPT | Performed by: FAMILY MEDICINE

## 2022-10-03 RX ORDER — FUROSEMIDE 20 MG/1
TABLET ORAL
Qty: 15 TABLET | Refills: 0 | Status: SHIPPED | OUTPATIENT
Start: 2022-10-03 | End: 2022-10-24 | Stop reason: SDUPTHER

## 2022-10-03 RX ORDER — DOXYCYCLINE 100 MG/1
100 CAPSULE ORAL 2 TIMES DAILY
Qty: 14 CAPSULE | Refills: 0 | Status: SHIPPED | OUTPATIENT
Start: 2022-10-03 | End: 2022-10-24

## 2022-10-03 NOTE — PROGRESS NOTES
Chief Complaint   Patient presents with    Cyst     Left upper back     1. \"Have you been to the ER, urgent care clinic since your last visit? Hospitalized since your last visit? \" No    2. \"Have you seen or consulted any other health care providers outside of the [address] [place] [address] since your last visit? \" No     3. For patients aged 39-70: Has the patient had a colonoscopy / FIT/ Cologuard? NA - based on age      If the patient is female:    4. For patients aged 41-77: Has the patient had a mammogram within the past 2 years? NA - based on age or sex      11. For patients aged 21-65: Has the patient had a pap smear?  NA - based on age or sex    Health Maintenance Due   Topic Date Due    COVID-19 Vaccine (1) Never done    Shingrix Vaccine Age 50> (1 of 2) Never done    Bone Densitometry (Dexa) Screening  Never done    DTaP/Tdap/Td series (2 - Td or Tdap) 04/22/2022    Pneumococcal 65+ years (2 - PPSV23 or PCV20) 06/16/2022

## 2022-10-03 NOTE — PROGRESS NOTES
[facility]    History of Present Illness:   Laura Thomas is a 80 y.o. female with history of HTN, Tubular adenoma, Meningioma, MID, Allergies, Cholelithiasis  CC: Abscess  History provided by patient and Records    HPI:  Cellulitis/Abscess Initial:  Patient presents with complaint of infection starting 2 weeks ago. Infection started secondary to Chronic pressure.  - Location: Left shoulder/Back   - Symptoms: Reports erythema and drainage; Denies pain, fever, and chills. - Pain: None  - Fevers greater than 102?: No  - Streaking across skin?: No  - Enlarged Lymph Nodes?: No  - Currently taking immunocompromising drugs?: No  - Last use of Antibiotics: None    Health Maintenance  Health Maintenance Due   Topic Date Due    COVID-19 Vaccine (1) Never done    Shingrix Vaccine Age 50> (1 of 2) Never done    Bone Densitometry (Dexa) Screening  Never done    DTaP/Tdap/Td series (2 - Td or Tdap) 04/22/2022    Pneumococcal 65+ years (2 - PPSV23 or PCV20) 06/16/2022       Past Medical, Family, and Social History:     Current Outpatient Medications on File Prior to Visit   Medication Sig Dispense Refill    lactulose (CHRONULAC) 10 gram/15 mL solution Take 30 mL by mouth two (2) times a day. 5400 mL 1    diclofenac (VOLTAREN) 1 % gel Apply 1 g to affected area four (4) times daily. On the left shoulder 350 g 2    fish oil-omega-3 fatty acids (Fish OiL) 300-500 mg cap Take 300 mg/day by mouth.      cranberry xt/multivitamin (CRANBERRY EXTRACT-MULTIVITAMIN PO) Take  by mouth. mv-mn/folic [id number] (DIABETIC SUPPORT FORMULA PO) Take  by mouth. co-enzyme Q-10 (CO Q-10) 100 mg capsule Take 100 mg by mouth daily. turmeric root extract 500 mg cap Take  by mouth. Iron Fum & P-FA-Vit B & C No.9 125 mg iron- 1 mg cap Take  by mouth.       ascorbic acid, vitamin C, (VITAMIN C) 500 mg tablet Take 500 mg by mouth.      gluc pemberton/chondro pemberton A/vit C/Mn (GLUCOSAMINE 1500 COMPLEX PO) Take  by mouth.      acetaminophen (TYLENOL) 325 mg tablet Take 2 Tablets by mouth every six (6) hours as needed for Pain. 90 Tablet 1    cinnamon bark 500 mg cap Take  by mouth. aspirin 81 mg chewable tablet Take 81 mg by mouth daily. cholecalciferol, VITAMIN D3, (VITAMIN D3) 5,000 unit tab tablet Take 1,000 Units by mouth daily. diclofenac EC (VOLTAREN) 50 mg EC tablet Take 1 Tablet by mouth two (2) times a day. (Patient not taking: No sig reported) 60 Tablet 1    sodium chloride (OCEAN) 0.65 % nasal squeeze bottle 0.05 mL by Both Nostrils route as needed for Congestion. (Patient not taking: No sig reported) 44 mL 4     No current facility-administered medications on file prior to visit. Patient Active Problem List   Diagnosis Code    Allergic rhinitis J30.9    HTN (hypertension) I10    Nephrolithiasis N20.0    Cholelithiasis K80.20    Tubular adenoma D36.9    Melanosis coli K63.89    Meningioma ([address] D32.9    Sensorineural hearing loss of both ears H90.3    Obesity, Class II, BMI 35-39.9 E66.9    BMI 35.0-35.9,adult Z68.35    Actinic keratoses L57.0    Parapelvic renal cyst N28.1    History of DVT (deep vein thrombosis) Z86.718    Chronic pain of both knees M25.561, M25.562, G89.29    Acute pain of right shoulder M25.511       Social History     Socioeconomic History    Marital status:    Tobacco Use    Smoking status: Never    Smokeless tobacco: Never   Substance and Sexual Activity    Alcohol use: No    Drug use: No     Social Determinants of Health     Financial Resource Strain: Low Risk     Difficulty of Paying Living Expenses: Not hard at all   Food Insecurity: No Food Insecurity    Worried About Running Out of Food in the Last Year: Never true    Ran Out of Food in the Last Year: Never true        Review of Systems   Review of Systems   Constitutional:  Negative for chills and fever. Respiratory:  Positive for cough. Gastrointestinal:  Negative for nausea and vomiting. Neurological:  Negative for dizziness and headaches. Objective:   Visit Vitals  BP (!) 161/85 (BP 1 Location: Right arm, BP Patient Position: Sitting, BP Cuff Size: Adult)   Pulse 76   Temp 98.5 °F (36.9 °C) (Oral)   Resp 18   Ht 5' 1\" (1.549 m)   Wt 234 lb (106.1 kg)   LMP 01/01/1986   SpO2 96%   BMI 44.21 kg/m²        Physical Exam  Vitals and nursing note reviewed. Constitutional:       Appearance: Normal appearance. HENT:      Head: Normocephalic and atraumatic. Cardiovascular:      Rate and Rhythm: Normal rate and regular rhythm. Pulses: Normal pulses. Heart sounds: Normal heart sounds. No murmur heard. No friction rub. No gallop. Pulmonary:      Effort: Pulmonary effort is normal.      Breath sounds: Normal breath sounds. Abdominal:      General: Abdomen is flat. Bowel sounds are normal.      Palpations: Abdomen is soft. Musculoskeletal:      Cervical back: Normal range of motion and neck supple. Skin:     Comments: Draining absces o nthe left shoulder/back area. Erythema present, no significant tenderness. Significant amounts of purulent material expressed with ease. Neurological:      Mental Status: She is alert. Pertinent Labs/Studies:      Assessment and orders:       ICD-10-CM ICD-9-CM    1. Abscess  L02.91 682.9 doxycycline (MONODOX) 100 mg capsule        Diagnoses and all orders for this visit:    1. Abscess  -     doxycycline (MONODOX) 100 mg capsule; Take 1 Capsule by mouth two (2) times a day. I have discussed the diagnosis with the patient and the intended plan as seen in the above orders. Social history, medical history, and labs were reviewed. The patient has received an after-visit summary and questions were answered concerning future plans. I have discussed medication side effects and warnings with the patient as well.     MD KRIS Alvarado & NAT OHARA [facility]  10/03/22

## 2022-10-06 ENCOUNTER — TELEPHONE (OUTPATIENT)
Dept: FAMILY MEDICINE CLINIC | Age: 87
End: 2022-10-06

## 2022-10-06 NOTE — TELEPHONE ENCOUNTER
Pt's son states he was to call back after pt had taken the Lasix for 3 days. Pt's son would like a call back.

## 2022-10-06 NOTE — TELEPHONE ENCOUNTER
Call returned to son to see how swelling is after taking the lasix. He stated it was a little better. Informed him per  that if it is not significantly improved please continue for the next 4 days though. He verbalized understanding . Stated mom had been weaker and coughing more lately.

## 2022-10-07 NOTE — TELEPHONE ENCOUNTER
Spoke with Rodney Bautista, son, and made him aware per Dr. Bienvenido Henley:     Can you pass along that if we get the excess fluid off that should help with the cough as well    He verbalized understanding but stated she was admitted yesterday to the hospital for covid.

## 2022-10-24 ENCOUNTER — OFFICE VISIT (OUTPATIENT)
Dept: FAMILY MEDICINE CLINIC | Age: 87
End: 2022-10-24
Payer: MEDICARE

## 2022-10-24 VITALS
BODY MASS INDEX: 42.48 KG/M2 | TEMPERATURE: 98.2 F | HEIGHT: 61 IN | SYSTOLIC BLOOD PRESSURE: 121 MMHG | OXYGEN SATURATION: 97 % | DIASTOLIC BLOOD PRESSURE: 69 MMHG | HEART RATE: 87 BPM | WEIGHT: 225 LBS | RESPIRATION RATE: 18 BRPM

## 2022-10-24 DIAGNOSIS — L72.3 INFLAMED SEBACEOUS CYST: ICD-10-CM

## 2022-10-24 DIAGNOSIS — M79.89 LEG SWELLING: ICD-10-CM

## 2022-10-24 DIAGNOSIS — Z09 HOSPITAL DISCHARGE FOLLOW-UP: ICD-10-CM

## 2022-10-24 DIAGNOSIS — I10 PRIMARY HYPERTENSION: Primary | ICD-10-CM

## 2022-10-24 PROCEDURE — 1101F PT FALLS ASSESS-DOCD LE1/YR: CPT | Performed by: FAMILY MEDICINE

## 2022-10-24 PROCEDURE — G8432 DEP SCR NOT DOC, RNG: HCPCS | Performed by: FAMILY MEDICINE

## 2022-10-24 PROCEDURE — G8427 DOCREV CUR MEDS BY ELIG CLIN: HCPCS | Performed by: FAMILY MEDICINE

## 2022-10-24 PROCEDURE — 1123F ACP DISCUSS/DSCN MKR DOCD: CPT | Performed by: FAMILY MEDICINE

## 2022-10-24 PROCEDURE — 99214 OFFICE O/P EST MOD 30 MIN: CPT | Performed by: FAMILY MEDICINE

## 2022-10-24 PROCEDURE — 1090F PRES/ABSN URINE INCON ASSESS: CPT | Performed by: FAMILY MEDICINE

## 2022-10-24 PROCEDURE — 1111F DSCHRG MED/CURRENT MED MERGE: CPT | Performed by: FAMILY MEDICINE

## 2022-10-24 PROCEDURE — G8417 CALC BMI ABV UP PARAM F/U: HCPCS | Performed by: FAMILY MEDICINE

## 2022-10-24 PROCEDURE — G8536 NO DOC ELDER MAL SCRN: HCPCS | Performed by: FAMILY MEDICINE

## 2022-10-24 RX ORDER — FUROSEMIDE 20 MG/1
20 TABLET ORAL
Qty: 30 TABLET | Refills: 1 | Status: SHIPPED | OUTPATIENT
Start: 2022-10-24

## 2022-10-24 RX ORDER — LISINOPRIL 10 MG/1
10 TABLET ORAL DAILY
COMMUNITY
Start: 2022-10-09

## 2022-10-24 NOTE — PROGRESS NOTES
1. Have you been to the ER, urgent care clinic since your last visit? Hospitalized since your last visit? Yes When: alberts ngotere alexandra     2. Have you seen or consulted any other health care providers outside of the [address] [place] [address] since your last visit? Include any pap smears or colon screening. No    3. For patients aged 39-70: Has the patient had a colonoscopy / FIT/ Cologuard? NA - based on age    If the patient is female:    4. For patients aged 41-77: Has the patient had a mammogram within the past 2 years? NA - based on age or sex      11. For patients aged 21-65: Has the patient had a pap smear? NA - based on age or sex     Reviewed record in preparation for visit and have necessary documentation  Pt did not bring medication to office visit for review  Patient is accompanied by son I have received verbal consent from Kelly Clements to discuss any/all medical information while they are present in the room.     Goals that were addressed and/or need to be completed during or after this appointment include     Health Maintenance Due   Topic Date Due    COVID-19 Vaccine (1) Never done    Shingrix Vaccine Age 50> (1 of 2) Never done    Bone Densitometry (Dexa) Screening  Never done    DTaP/Tdap/Td series (2 - Td or Tdap) 04/22/2022    Pneumococcal 65+ years (2 - PPSV23 or PCV20) 06/16/2022

## 2022-10-24 NOTE — PROGRESS NOTES
Progress Note    Patient: Chapis Rivera MRN: [id number]  SSN: [id number]    YOB: 1934  Age: 80 y.o. Sex: female        Chief Complaint   Patient presents with    Hospital Follow Up     Sarthak Ma 10/7/22      she is a 80y.o. year old female who presents for follow up of [facility] for Covid-19. She says she is feeling much improved. Patient with hx of HTN and obesity. She has an inflamed cyst on her back which continues to drain. BP well controlled today. She complains of LE edema. She did lose weight secondary to Covid-19. Encounter Diagnoses   Name Primary? Primary hypertension Yes    Leg swelling     Inflamed sebaceous cyst     Hospital discharge follow-up      BP Readings from Last 3 Encounters:   10/24/22 121/69   10/03/22 (!) 161/85   09/15/22 130/66     Wt Readings from Last 3 Encounters:   10/24/22 225 lb (102.1 kg)   10/03/22 234 lb (106.1 kg)   09/15/22 230 lb (104.3 kg)     Body mass index is 42.51 kg/m².     Patient Active Problem List   Diagnosis Code    Allergic rhinitis J30.9    HTN (hypertension) I10    Nephrolithiasis N20.0    Cholelithiasis K80.20    Tubular adenoma D36.9    Melanosis coli K63.89    Meningioma ([address] D32.9    Sensorineural hearing loss of both ears H90.3    Obesity, Class II, BMI 35-39.9 E66.9    BMI 35.0-35.9,adult Z68.35    Actinic keratoses L57.0    Parapelvic renal cyst N28.1    History of DVT (deep vein thrombosis) Z86.718    Chronic pain of both knees M25.561, M25.562, G89.29    Acute pain of right shoulder M25.511     Past Surgical History:   Procedure Laterality Date    [place] ABDOMEN SURGERY PROC UNLISTED       Social History     Socioeconomic History    Marital status:      Spouse name: Not on file    Number of children: Not on file    Years of education: Not on file    Highest education level: Not on file   Occupational History    Not on file   Tobacco Use    Smoking status: Never    Smokeless tobacco: Never   Substance and Sexual Activity    Alcohol use: No    Drug use: No    Sexual activity: Not on file   Other Topics Concern    Not on file   Social History Narrative    Not on file     Social Determinants of Health     Financial Resource Strain: Low Risk     Difficulty of Paying Living Expenses: Not hard at all   Food Insecurity: No Food Insecurity    Worried About Running Out of Food in the Last Year: Never true    Ran Out of Food in the Last Year: Never true   Transportation Needs: Not on file   Physical Activity: Not on file   Stress: Not on file   Social Connections: Not on file   Intimate Partner Violence: Not on file   Housing Stability: Not on file     Family History   Problem Relation Age of Onset    Cancer Mother [de-identified]        breast cancer    Alcohol abuse Father      Current Outpatient Medications   Medication Sig    lisinopriL (PRINIVIL, ZESTRIL) 10 mg tablet Take 10 mg by mouth daily. furosemide (LASIX) 20 mg tablet Take 1 Tablet by mouth daily as needed (lower leg edema). lactulose (CHRONULAC) 10 gram/15 mL solution Take 30 mL by mouth two (2) times a day. diclofenac (VOLTAREN) 1 % gel Apply 1 g to affected area four (4) times daily. On the left shoulder    fish oil-omega-3 fatty acids (Fish OiL) 300-500 mg cap Take 300 mg/day by mouth.    cranberry xt/multivitamin (CRANBERRY EXTRACT-MULTIVITAMIN PO) Take  by mouth. mv-mn/folic [id number] (DIABETIC SUPPORT FORMULA PO) Take  by mouth. co-enzyme Q-10 (CO Q-10) 100 mg capsule Take 100 mg by mouth daily. Iron Fum & P-FA-Vit B & C No.9 125 mg iron- 1 mg cap Take  by mouth. ascorbic acid, vitamin C, (VITAMIN C) 500 mg tablet Take 500 mg by mouth.    gluc pemberton/chondro pemberton A/vit C/Mn (GLUCOSAMINE 1500 COMPLEX PO) Take  by mouth. acetaminophen (TYLENOL) 325 mg tablet Take 2 Tablets by mouth every six (6) hours as needed for Pain. cinnamon bark 500 mg cap Take  by mouth. aspirin 81 mg chewable tablet Take 81 mg by mouth daily.     cholecalciferol, VITAMIN D3, (VITAMIN D3) 5,000 unit tab tablet Take 1,000 Units by mouth daily. No current facility-administered medications for this visit. No Known Allergies    Review of Systems:  Constitutional: Negative for fatigue, malaise  Resp: Negative for cough, wheezing or SOB  CV: Negative for chest pain, dizziness or palpitations  GI: Negative for nausea or abdominal pain  MS: Negative for acute myalgias or arthralgias   Neuro: Negative for HA, weakness or paresthesia  Psych: Negative for depression or anxiety     Vitals:    10/24/22 1354   BP: 121/69   Pulse: 87   Resp: 18   Temp: 98.2 °F (36.8 °C)   SpO2: 97%   Weight: 225 lb (102.1 kg)   Height: 5' 1\" (1.549 m)       Physical Examination:  General: Well developed, well nourished, in no acute distress  Head: Normocephalic, atraumatic  Eyes: Sclera clear, EOMI  Neck: Normal range of motion  Respiratory: symmetrical, unlabored effort  Cardiovascular: Regular rate and rhythm  Extremities: Full range of motion, normal gait  Neurologic: No focal deficits  Psych: Active, alert and oriented. Affect appropriate       ICD-10-CM ICD-9-CM    1. Primary hypertension  I10 401.9 furosemide (LASIX) 20 mg tablet      2. Leg swelling  M79.89 729.81 furosemide (LASIX) 20 mg tablet      3. Inflamed sebaceous cyst  L72.3 706.2 CULTURE, WOUND W GRAM STAIN      CULTURE, WOUND W GRAM STAIN      4. Hospital discharge follow-up  Z09 V67.59 HI DISCHARGE MEDS RECONCILED W/ CURRENT OUTPATIENT MED LIST          Plan of care:  Diagnoses were discussed in detail with patient. Medication risks/benefits/side effects discussed with patient. All of the patient's questions were addressed and answered to apparent satisfaction. The patient understands and agrees with our plan of care. The patient knows to call back if they have questions about the plan of care or if symptoms change. The patient received an After-Visit Summary which contains VS, diagnoses, orders, allergy and medication lists. Future Appointments   Date Time Provider Kenyatta Silvia   11/9/2022  9:00 AM MD SINA Askew BS AMB   1/12/2023 10:00 AM MD SINA Askew BS AMB           Follow-up and Dispositions    Return in about 2 weeks (around 11/7/2022).

## 2022-10-27 LAB
BACTERIA SPEC CULT: NORMAL
GRAM STN SPEC: NORMAL
GRAM STN SPEC: NORMAL
SERVICE CMNT-IMP: NORMAL

## 2022-11-09 ENCOUNTER — OFFICE VISIT (OUTPATIENT)
Dept: FAMILY MEDICINE CLINIC | Age: 87
End: 2022-11-09
Payer: MEDICARE

## 2022-11-09 VITALS
BODY MASS INDEX: 41.84 KG/M2 | TEMPERATURE: 97.7 F | RESPIRATION RATE: 16 BRPM | OXYGEN SATURATION: 97 % | HEIGHT: 61 IN | HEART RATE: 76 BPM | SYSTOLIC BLOOD PRESSURE: 143 MMHG | DIASTOLIC BLOOD PRESSURE: 83 MMHG | WEIGHT: 221.6 LBS

## 2022-11-09 DIAGNOSIS — I10 PRIMARY HYPERTENSION: Primary | Chronic | ICD-10-CM

## 2022-11-09 DIAGNOSIS — L72.3 INFLAMED SEBACEOUS CYST: ICD-10-CM

## 2022-11-09 PROCEDURE — G8427 DOCREV CUR MEDS BY ELIG CLIN: HCPCS | Performed by: FAMILY MEDICINE

## 2022-11-09 PROCEDURE — G8417 CALC BMI ABV UP PARAM F/U: HCPCS | Performed by: FAMILY MEDICINE

## 2022-11-09 PROCEDURE — G8510 SCR DEP NEG, NO PLAN REQD: HCPCS | Performed by: FAMILY MEDICINE

## 2022-11-09 PROCEDURE — 1123F ACP DISCUSS/DSCN MKR DOCD: CPT | Performed by: FAMILY MEDICINE

## 2022-11-09 PROCEDURE — 1090F PRES/ABSN URINE INCON ASSESS: CPT | Performed by: FAMILY MEDICINE

## 2022-11-09 PROCEDURE — 1101F PT FALLS ASSESS-DOCD LE1/YR: CPT | Performed by: FAMILY MEDICINE

## 2022-11-09 PROCEDURE — 99214 OFFICE O/P EST MOD 30 MIN: CPT | Performed by: FAMILY MEDICINE

## 2022-11-09 PROCEDURE — G8536 NO DOC ELDER MAL SCRN: HCPCS | Performed by: FAMILY MEDICINE

## 2022-11-09 NOTE — PROGRESS NOTES
1. Have you been to the ER, urgent care clinic since your last visit? Hospitalized since your last visit? No    2. Have you seen or consulted any other health care providers outside of the [address] [place] [address] since your last visit? Including any pap smears or colon screening.  No      Health Maintenance Due   Topic Date Due    COVID-19 Vaccine (1) Never done    Shingrix Vaccine Age 50> (1 of 2) Never done    Bone Densitometry (Dexa) Screening  Never done    DTaP/Tdap/Td series (2 - Td or Tdap) 04/22/2022    Pneumococcal 65+ years (2 - PPSV23 if available, else PCV20) 06/16/2022

## 2022-11-09 NOTE — PROGRESS NOTES
[facility]    History of Present Illness:   Reji Galvan is a 80 y.o. female with history of HTN, Tubular adenoma, Meningioma, MID, Allergies, Cholelithiasis  CC: Follow up Abscess  History provided by patient and Records    HPI:  Abscess significantly improved. Wound culture is negative. Some drainage still though thicker. Completed Doxycycline previously. Hypertension Follow up:  Currently Taking:   Key CAD CHF Meds               lisinopriL (PRINIVIL, ZESTRIL) 10 mg tablet (Taking) Take 10 mg by mouth daily. furosemide (LASIX) 20 mg tablet (Taking) Take 1 Tablet by mouth daily as needed (lower leg edema). fish oil-omega-3 fatty acids (Fish OiL) 300-500 mg cap (Taking) Take 300 mg/day by mouth. aspirin 81 mg chewable tablet (Taking) Take 81 mg by mouth daily. The patient reports:  Patient is not taking Lisinopril currently, is taking Lasix though, both from hospital., no medication side effects noted, home BP monitoring in range of 871-623'N systolic over 64-12'L'D diastolic, no TIA's, no chest pain on exertion, no dyspnea on exertion, no swelling of ankles, no orthostatic dizziness or lightheadedness, no orthopnea or paroxysmal nocturnal dyspnea. BP Readings from Last 3 Encounters:   11/09/22 (!) 143/83   10/24/22 121/69   10/03/22 (!) 161/85        Health Maintenance  Health Maintenance Due   Topic Date Due    COVID-19 Vaccine (1) Never done    Shingrix Vaccine Age 50> (1 of 2) Never done    Bone Densitometry (Dexa) Screening  Never done    DTaP/Tdap/Td series (2 - Td or Tdap) 04/22/2022    Pneumococcal 65+ years (2 - PPSV23 if available, else PCV20) 06/16/2022       Past Medical, Family, and Social History:     Current Outpatient Medications on File Prior to Visit   Medication Sig Dispense Refill    lisinopriL (PRINIVIL, ZESTRIL) 10 mg tablet Take 10 mg by mouth daily.       furosemide (LASIX) 20 mg tablet Take 1 Tablet by mouth daily as needed (lower leg edema). 30 Tablet 1    lactulose (CHRONULAC) 10 gram/15 mL solution Take 30 mL by mouth two (2) times a day. 5400 mL 1    diclofenac (VOLTAREN) 1 % gel Apply 1 g to affected area four (4) times daily. On the left shoulder 350 g 2    fish oil-omega-3 fatty acids (Fish OiL) 300-500 mg cap Take 300 mg/day by mouth.      cranberry xt/multivitamin (CRANBERRY EXTRACT-MULTIVITAMIN PO) Take  by mouth. mv-mn/folic [id number] (DIABETIC SUPPORT FORMULA PO) Take  by mouth. co-enzyme Q-10 (CO Q-10) 100 mg capsule Take 100 mg by mouth daily. Iron Fum & P-FA-Vit B & C No.9 125 mg iron- 1 mg cap Take  by mouth. ascorbic acid, vitamin C, (VITAMIN C) 500 mg tablet Take 500 mg by mouth.      gluc pemberton/chondro pemberton A/vit C/Mn (GLUCOSAMINE 1500 COMPLEX PO) Take  by mouth. acetaminophen (TYLENOL) 325 mg tablet Take 2 Tablets by mouth every six (6) hours as needed for Pain. 90 Tablet 1    cinnamon bark 500 mg cap Take  by mouth. aspirin 81 mg chewable tablet Take 81 mg by mouth daily. cholecalciferol, VITAMIN D3, (VITAMIN D3) 5,000 unit tab tablet Take 1,000 Units by mouth daily. No current facility-administered medications on file prior to visit. Patient Active Problem List   Diagnosis Code    Allergic rhinitis J30.9    HTN (hypertension) I10    Nephrolithiasis N20.0    Cholelithiasis K80.20    Tubular adenoma D36.9    Melanosis coli K63.89    Meningioma ([address] D32.9    Sensorineural hearing loss of both ears H90.3    Obesity, Class II, BMI 35-39.9 E66.9    BMI 35.0-35.9,adult Z68.35    Actinic keratoses L57.0    Parapelvic renal cyst N28.1    History of DVT (deep vein thrombosis) Z86.718    Chronic pain of both knees M25.561, M25.562, G89.29    Acute pain of right shoulder M25.511       Social History     Socioeconomic History    Marital status:    Tobacco Use    Smoking status: Never    Smokeless tobacco: Never   Substance and Sexual Activity    Alcohol use: No    Drug use:  No Social Determinants of Health     Financial Resource Strain: Low Risk     Difficulty of Paying Living Expenses: Not hard at all   Food Insecurity: No Food Insecurity    Worried About Running Out of Food in the Last Year: Never true    Ran Out of Food in the Last Year: Never true        Review of Systems   Review of Systems   Constitutional:  Negative for chills and fever. Cardiovascular:  Negative for chest pain and palpitations. Gastrointestinal:  Negative for nausea and vomiting. Neurological:  Negative for dizziness and headaches. Objective:   Visit Vitals  BP (!) 143/83 (BP 1 Location: Left lower arm, BP Patient Position: Sitting, BP Cuff Size: Small adult)   Pulse 76   Temp 97.7 °F (36.5 °C) (Oral)   Resp 16   Ht 5' 1\" (1.549 m)   Wt 221 lb 9.6 oz (100.5 kg)   LMP 01/01/1986   SpO2 97%   BMI 41.87 kg/m²        Physical Exam  Vitals and nursing note reviewed. Constitutional:       Appearance: Normal appearance. HENT:      Head: Normocephalic and atraumatic. Cardiovascular:      Rate and Rhythm: Normal rate and regular rhythm. Pulses: Normal pulses. Heart sounds: Normal heart sounds. Pulmonary:      Effort: Pulmonary effort is normal.      Breath sounds: Normal breath sounds. Abdominal:      General: Abdomen is flat. Bowel sounds are normal.      Palpations: Abdomen is soft. Genitourinary:     General: Normal vulva. Rectum: Normal.   Musculoskeletal:         General: Normal range of motion. Cervical back: Normal range of motion and neck supple. Skin:     General: Skin is warm and dry. Neurological:      General: No focal deficit present. Mental Status: She is alert and oriented to person, place, and time. Psychiatric:         Mood and Affect: Mood normal.         Behavior: Behavior normal.       Pertinent Labs/Studies:      Assessment and orders:       ICD-10-CM ICD-9-CM    1. Primary hypertension  I10 401.9       2.  Inflamed sebaceous cyst  L72.3 706.2 Diagnoses and all orders for this visit:    1. Primary hypertension: Continue BP Monitoring, holding Lisinopril now and continue Lasix, monitor BP, and see if need to continue. 2. Inflamed sebaceous cyst: Material expressed, no acute infection now. COnintue topical antibiotics for now. Follow-up and Dispositions    Return in about 3 months (around 2/9/2023). I have discussed the diagnosis with the patient and the intended plan as seen in the above orders. Social history, medical history, and labs were reviewed. The patient has received an after-visit summary and questions were answered concerning future plans. I have discussed medication side effects and warnings with the patient as well.     MD KRIS Mata & NAT OHARA [facility]  11/09/22

## 2022-12-22 DIAGNOSIS — M79.89 LEG SWELLING: ICD-10-CM

## 2022-12-22 DIAGNOSIS — I10 PRIMARY HYPERTENSION: ICD-10-CM

## 2022-12-22 RX ORDER — FUROSEMIDE 20 MG/1
20 TABLET ORAL
Qty: 90 TABLET | Refills: 1 | Status: SHIPPED | OUTPATIENT
Start: 2022-12-22

## 2023-01-12 ENCOUNTER — OFFICE VISIT (OUTPATIENT)
Dept: FAMILY MEDICINE CLINIC | Age: 88
End: 2023-01-12
Payer: MEDICARE

## 2023-01-12 VITALS
BODY MASS INDEX: 42.29 KG/M2 | TEMPERATURE: 98.4 F | WEIGHT: 224 LBS | RESPIRATION RATE: 17 BRPM | HEART RATE: 89 BPM | SYSTOLIC BLOOD PRESSURE: 143 MMHG | OXYGEN SATURATION: 98 % | HEIGHT: 61 IN | DIASTOLIC BLOOD PRESSURE: 84 MMHG

## 2023-01-12 DIAGNOSIS — R05.1 ACUTE COUGH: ICD-10-CM

## 2023-01-12 DIAGNOSIS — M25.562 CHRONIC PAIN OF BOTH KNEES: ICD-10-CM

## 2023-01-12 DIAGNOSIS — M25.561 CHRONIC PAIN OF BOTH KNEES: ICD-10-CM

## 2023-01-12 DIAGNOSIS — R53.81 DEBILITY: ICD-10-CM

## 2023-01-12 DIAGNOSIS — I10 PRIMARY HYPERTENSION: Primary | Chronic | ICD-10-CM

## 2023-01-12 DIAGNOSIS — N18.30 CONTROLLED TYPE 2 DIABETES MELLITUS WITH STAGE 3 CHRONIC KIDNEY DISEASE, WITHOUT LONG-TERM CURRENT USE OF INSULIN (HCC): ICD-10-CM

## 2023-01-12 DIAGNOSIS — D32.9 MENINGIOMA (HCC): ICD-10-CM

## 2023-01-12 DIAGNOSIS — E66.01 OBESITY, CLASS III, BMI 40-49.9 (MORBID OBESITY) (HCC): ICD-10-CM

## 2023-01-12 DIAGNOSIS — E11.22 CONTROLLED TYPE 2 DIABETES MELLITUS WITH STAGE 3 CHRONIC KIDNEY DISEASE, WITHOUT LONG-TERM CURRENT USE OF INSULIN (HCC): ICD-10-CM

## 2023-01-12 DIAGNOSIS — Z86.718 HISTORY OF DVT (DEEP VEIN THROMBOSIS): Chronic | ICD-10-CM

## 2023-01-12 DIAGNOSIS — G89.29 CHRONIC PAIN OF BOTH KNEES: ICD-10-CM

## 2023-01-12 PROCEDURE — 1101F PT FALLS ASSESS-DOCD LE1/YR: CPT | Performed by: FAMILY MEDICINE

## 2023-01-12 PROCEDURE — 99214 OFFICE O/P EST MOD 30 MIN: CPT | Performed by: FAMILY MEDICINE

## 2023-01-12 PROCEDURE — G8536 NO DOC ELDER MAL SCRN: HCPCS | Performed by: FAMILY MEDICINE

## 2023-01-12 PROCEDURE — G8417 CALC BMI ABV UP PARAM F/U: HCPCS | Performed by: FAMILY MEDICINE

## 2023-01-12 PROCEDURE — G8427 DOCREV CUR MEDS BY ELIG CLIN: HCPCS | Performed by: FAMILY MEDICINE

## 2023-01-12 PROCEDURE — G8510 SCR DEP NEG, NO PLAN REQD: HCPCS | Performed by: FAMILY MEDICINE

## 2023-01-12 PROCEDURE — 1123F ACP DISCUSS/DSCN MKR DOCD: CPT | Performed by: FAMILY MEDICINE

## 2023-01-12 PROCEDURE — 1090F PRES/ABSN URINE INCON ASSESS: CPT | Performed by: FAMILY MEDICINE

## 2023-01-12 RX ORDER — GUAIFENESIN 600 MG/1
600 TABLET, EXTENDED RELEASE ORAL 2 TIMES DAILY
Qty: 60 TABLET | Refills: 0 | Status: SHIPPED | OUTPATIENT
Start: 2023-01-12

## 2023-01-12 NOTE — PROGRESS NOTES
Chief Complaint   Patient presents with    Follow Up Chronic Condition     1. \"Have you been to the ER, urgent care clinic since your last visit? Hospitalized since your last visit? \" No    2. \"Have you seen or consulted any other health care providers outside of the [address] [place] [address] since your last visit? \" No     3. For patients aged 39-70: Has the patient had a colonoscopy / FIT/ Cologuard? NA - based on age      If the patient is female:    4. For patients aged 41-77: Has the patient had a mammogram within the past 2 years? NA - based on age or sex      11. For patients aged 21-65: Has the patient had a pap smear?  NA - based on age or sex    Health Maintenance Due   Topic Date Due    COVID-19 Vaccine (1) Never done    Shingles Vaccine (1 of 2) Never done    Bone Densitometry (Dexa) Screening  Never done    DTaP/Tdap/Td series (2 - Td or Tdap) 04/22/2022    Pneumococcal 65+ years (2 - PPSV23 if available, else PCV20) 06/16/2022

## 2023-01-12 NOTE — PROGRESS NOTES
[facility]    History of Present Illness:   Samuel Worrell is a 80 y.o. female with history of  HTN, Tubular adenoma, Meningioma, MID, Allergies, Cholelithiasis  CC: Follow up and Cough  History provided by patient and Records    HPI:  Hypertension Follow up:  Currently Taking:   Key CAD CHF Meds               furosemide (LASIX) 20 mg tablet (Taking) Take 1 Tablet by mouth daily as needed (lower leg edema). lisinopriL (PRINIVIL, ZESTRIL) 10 mg tablet (Taking) Take 10 mg by mouth daily. fish oil-omega-3 fatty acids (Fish OiL) 300-500 mg cap (Taking) Take 300 mg/day by mouth. aspirin 81 mg chewable tablet (Taking) Take 81 mg by mouth daily. The patient reports:  taking medications as instructed, no medication side effects noted, home BP monitoring in range of 484-829'S systolic over 87-74'E diastolic, no TIA's, no chest pain on exertion, no dyspnea on exertion, no swelling of ankles, no orthostatic dizziness or lightheadedness, no orthopnea or paroxysmal nocturnal dyspnea. BP Readings from Last 3 Encounters:   01/12/23 (!) 143/84   11/09/22 (!) 143/83   10/24/22 121/69      Osteoarthritis Knees: Bilateral, pain with standing, but okay when sitting. Cough: Noting cough with eating recently. Diabetes: Diet Controlled    Health Maintenance  Health Maintenance Due   Topic Date Due    COVID-19 Vaccine (1) Never done    Shingles Vaccine (1 of 2) Never done    Bone Densitometry (Dexa) Screening  Never done    DTaP/Tdap/Td series (2 - Td or Tdap) 04/22/2022    Pneumococcal 65+ years (2 - PPSV23 if available, else PCV20) 06/16/2022       Past Medical, Family, and Social History:     Current Outpatient Medications on File Prior to Visit   Medication Sig Dispense Refill    OTHER Tumeric 50 mg daily      furosemide (LASIX) 20 mg tablet Take 1 Tablet by mouth daily as needed (lower leg edema). 90 Tablet 1    lisinopriL (PRINIVIL, ZESTRIL) 10 mg tablet Take 10 mg by mouth daily. lactulose (CHRONULAC) 10 gram/15 mL solution Take 30 mL by mouth two (2) times a day. 5400 mL 1    diclofenac (VOLTAREN) 1 % gel Apply 1 g to affected area four (4) times daily. On the left shoulder 350 g 2    fish oil-omega-3 fatty acids (Fish OiL) 300-500 mg cap Take 300 mg/day by mouth.      cranberry xt/multivitamin (CRANBERRY EXTRACT-MULTIVITAMIN PO) Take  by mouth. mv-mn/folic [id number] (DIABETIC SUPPORT FORMULA PO) Take  by mouth. co-enzyme Q-10 (CO Q-10) 100 mg capsule Take 100 mg by mouth daily. Iron Fum & P-FA-Vit B & C No.9 125 mg iron- 1 mg cap Take  by mouth. ascorbic acid, vitamin C, (VITAMIN C) 500 mg tablet Take 500 mg by mouth.      gluc pemberton/chondro pemberton A/vit C/Mn (GLUCOSAMINE 1500 COMPLEX PO) Take  by mouth. acetaminophen (TYLENOL) 325 mg tablet Take 2 Tablets by mouth every six (6) hours as needed for Pain. 90 Tablet 1    cinnamon bark 500 mg cap Take  by mouth. aspirin 81 mg chewable tablet Take 81 mg by mouth daily. cholecalciferol, VITAMIN D3, (VITAMIN D3) 5,000 unit tab tablet Take 1,000 Units by mouth daily. No current facility-administered medications on file prior to visit.        Patient Active Problem List   Diagnosis Code    Allergic rhinitis J30.9    HTN (hypertension) I10    Nephrolithiasis N20.0    Cholelithiasis K80.20    Tubular adenoma D36.9    Melanosis coli K63.89    Meningioma ([facility] D32.9    Sensorineural hearing loss of both ears H90.3    Class 3 severe obesity due to excess calories without serious comorbidity with body mass index (BMI) of 40.0 to 44.9 in adult ([facility] E66.01, Z68.41    BMI 35.0-35.9,adult Z68.35    Actinic keratoses L57.0    Parapelvic renal cyst N28.1    History of DVT (deep vein thrombosis) Z86.718    Chronic pain of both knees M25.561, M25.562, G89.29    Acute pain of right shoulder M25.511    Controlled type 2 diabetes mellitus with stage 3 chronic kidney disease, without long-term current use of insulin ([facility] E11.22, N18.30    Debility R53.81       Social History     Socioeconomic History    Marital status:    Tobacco Use    Smoking status: Never    Smokeless tobacco: Never   Substance and Sexual Activity    Alcohol use: No    Drug use: No     Social Determinants of Health     Financial Resource Strain: Low Risk     Difficulty of Paying Living Expenses: Not hard at all   Food Insecurity: No Food Insecurity    Worried About Running Out of Food in the Last Year: Never true    Ran Out of Food in the Last Year: Never true        Review of Systems   Review of Systems   Constitutional:  Negative for chills and fever. Respiratory:  Positive for cough. Cardiovascular:  Negative for chest pain and palpitations. Gastrointestinal:  Negative for abdominal pain, nausea and vomiting. Objective:   Visit Vitals  BP (!) 143/84 (BP 1 Location: Left lower arm, BP Patient Position: Sitting, BP Cuff Size: Adult)   Pulse 89   Temp 98.4 °F (36.9 °C) (Oral)   Resp 17   Ht 5' 1\" (1.549 m)   Wt 224 lb (101.6 kg)   LMP 01/01/1986   SpO2 98%   BMI 42.32 kg/m²        Physical Exam  Vitals and nursing note reviewed. Constitutional:       Appearance: Normal appearance. HENT:      Head: Normocephalic and atraumatic. Cardiovascular:      Rate and Rhythm: Normal rate and regular rhythm. Pulses: Normal pulses. Heart sounds: Normal heart sounds. No murmur heard. No friction rub. No gallop. Pulmonary:      Effort: Pulmonary effort is normal.      Breath sounds: Rhonchi and rales present. Comments: Noting mild crackles/rhonchi in the lower left lung field. Abdominal:      General: Abdomen is flat. Bowel sounds are normal.      Palpations: Abdomen is soft. Musculoskeletal:      Cervical back: Normal range of motion and neck supple. Skin:     General: Skin is warm and dry. Neurological:      Mental Status: She is alert.        Pertinent Labs/Studies:      Assessment and orders:       ICD-10-CM ICD-9-CM 1. Primary hypertension  I10 401.9 CBC W/O DIFF      METABOLIC PANEL, COMPREHENSIVE      2. Obesity, Class III, BMI 40-49.9 (morbid obesity) (HCC)  E66.01 278.01       3. Meningioma (HCC)  D32.9 225.2       4. History of DVT (deep vein thrombosis)  Z86.718 V12.51       5. Chronic pain of both knees  M25.561 719.46     M25.562 338.29     G89.29        6. Acute cough  R05.1 786.2 guaiFENesin ER (MUCINEX) 600 mg ER tablet      7. Controlled type 2 diabetes mellitus with stage 3 chronic kidney disease, without long-term current use of insulin ([facility])  E11.22 250.40 LIPID PANEL    N18.30 585.3 HEMOGLOBIN A1C WITH EAG      8. Debility  R53.81 799.3         Diagnoses and all orders for this visit:    1. Primary hypertension  -     CBC W/O DIFF; Future  -     METABOLIC PANEL, COMPREHENSIVE; Future    2. Obesity, Class III, BMI 40-49.9 (morbid obesity) ([facility]    3. Meningioma ([facility]    4. History of DVT (deep vein thrombosis)    5. Chronic pain of both knees    6. Acute cough  -     guaiFENesin ER (MUCINEX) 600 mg ER tablet; Take 1 Tablet by mouth two (2) times a day. 7. Controlled type 2 diabetes mellitus with stage 3 chronic kidney disease, without long-term current use of insulin ([facility])  -     LIPID PANEL; Future  -     HEMOGLOBIN A1C WITH EAG; Future    8. Debility    Follow-up and Dispositions    Return in about 3 months (around 4/12/2023). I have discussed the diagnosis with the patient and the intended plan as seen in the above orders. Social history, medical history, and labs were reviewed. The patient has received an after-visit summary and questions were answered concerning future plans. I have discussed medication side effects and warnings with the patient as well.     MD KRIS Smith & NAT OHARA [facility]  01/12/23

## 2023-01-13 LAB
ALBUMIN SERPL-MCNC: 3.6 G/DL (ref 3.5–5)
ALBUMIN/GLOB SERPL: 1 (ref 1.1–2.2)
ALP SERPL-CCNC: 73 U/L (ref 45–117)
ALT SERPL-CCNC: 22 U/L (ref 12–78)
ANION GAP SERPL CALC-SCNC: 4 MMOL/L (ref 5–15)
AST SERPL-CCNC: 17 U/L (ref 15–37)
BILIRUB SERPL-MCNC: 0.3 MG/DL (ref 0.2–1)
BUN SERPL-MCNC: 13 MG/DL (ref 6–20)
BUN/CREAT SERPL: 14 (ref 12–20)
CALCIUM SERPL-MCNC: 9.8 MG/DL (ref 8.5–10.1)
CHLORIDE SERPL-SCNC: 105 MMOL/L (ref 97–108)
CHOLEST SERPL-MCNC: 196 MG/DL
CO2 SERPL-SCNC: 29 MMOL/L (ref 21–32)
CREAT SERPL-MCNC: 0.91 MG/DL (ref 0.55–1.02)
ERYTHROCYTE [DISTWIDTH] IN BLOOD BY AUTOMATED COUNT: 13 % (ref 11.5–14.5)
EST. AVERAGE GLUCOSE BLD GHB EST-MCNC: 146 MG/DL
GLOBULIN SER CALC-MCNC: 3.5 G/DL (ref 2–4)
GLUCOSE SERPL-MCNC: 129 MG/DL (ref 65–100)
HBA1C MFR BLD: 6.7 % (ref 4–5.6)
HCT VFR BLD AUTO: 39.5 % (ref 35–47)
HDLC SERPL-MCNC: 46 MG/DL
HDLC SERPL: 4.3 (ref 0–5)
HGB BLD-MCNC: 12.8 G/DL (ref 11.5–16)
LDLC SERPL CALC-MCNC: 120.2 MG/DL (ref 0–100)
MCH RBC QN AUTO: 30.8 PG (ref 26–34)
MCHC RBC AUTO-ENTMCNC: 32.4 G/DL (ref 30–36.5)
MCV RBC AUTO: 95.2 FL (ref 80–99)
NRBC # BLD: 0 K/UL (ref 0–0.01)
NRBC BLD-RTO: 0 PER 100 WBC
PLATELET # BLD AUTO: 317 K/UL (ref 150–400)
PMV BLD AUTO: 10.6 FL (ref 8.9–12.9)
POTASSIUM SERPL-SCNC: 4 MMOL/L (ref 3.5–5.1)
PROT SERPL-MCNC: 7.1 G/DL (ref 6.4–8.2)
RBC # BLD AUTO: 4.15 M/UL (ref 3.8–5.2)
SODIUM SERPL-SCNC: 138 MMOL/L (ref 136–145)
TRIGL SERPL-MCNC: 149 MG/DL (ref ?–150)
VLDLC SERPL CALC-MCNC: 29.8 MG/DL
WBC # BLD AUTO: 6.9 K/UL (ref 3.6–11)

## 2023-01-16 ENCOUNTER — TELEPHONE (OUTPATIENT)
Dept: FAMILY MEDICINE CLINIC | Age: 88
End: 2023-01-16

## 2023-01-16 NOTE — TELEPHONE ENCOUNTER
Call pharmacy and was informed med was not covered by insurance. Called pt wants something that will be covered ordered.

## 2023-01-16 NOTE — TELEPHONE ENCOUNTER
----- Message from Jean Ayers sent at 1/16/2023 12:13 PM EST -----  Subject: Medication Problem    Medication: guaiFENesin ER (MUCINEX) 600 mg ER tablet  Dosage: take one tablet twice a daily  Ordering Provider: chana    Question/Problem: Pt son went to  prescription and the pharmacy   said they don't have it. Please call pharmacy to verify. Phone number   (cell) is son's and he doesn't answer his voicemail. Pharmacy: [facility] [address]    ---------------------------------------------------------------------------  --------------  [id number]  [phone];  Do not leave any message, patient will call back for answer  ---------------------------------------------------------------------------  --------------    SCRIPT ANSWERS  Relationship to Patient: Other  Representative Name: Kizzy Connell  Is the Representative on the appropriate HIPAA document in Epic: Yes

## 2023-06-08 ENCOUNTER — TELEPHONE (OUTPATIENT)
Facility: CLINIC | Age: 88
End: 2023-06-08

## 2023-06-08 RX ORDER — FUROSEMIDE 20 MG/1
20 TABLET ORAL DAILY PRN
Qty: 60 TABLET | Refills: 1 | Status: SHIPPED | OUTPATIENT
Start: 2023-06-08

## 2023-06-08 NOTE — TELEPHONE ENCOUNTER
Refill of medication.     MD ANTONIO Dixon & MAYANK KIRBY [facility]  06/08/23

## 2023-07-12 ENCOUNTER — OFFICE VISIT (OUTPATIENT)
Facility: CLINIC | Age: 88
End: 2023-07-12
Payer: MEDICARE

## 2023-07-12 VITALS
HEART RATE: 84 BPM | TEMPERATURE: 97.6 F | RESPIRATION RATE: 18 BRPM | SYSTOLIC BLOOD PRESSURE: 140 MMHG | OXYGEN SATURATION: 96 % | DIASTOLIC BLOOD PRESSURE: 80 MMHG

## 2023-07-12 DIAGNOSIS — D32.9 MENINGIOMA (HCC): ICD-10-CM

## 2023-07-12 DIAGNOSIS — M25.561 CHRONIC PAIN OF BOTH KNEES: ICD-10-CM

## 2023-07-12 DIAGNOSIS — E66.01 CLASS 3 SEVERE OBESITY DUE TO EXCESS CALORIES WITHOUT SERIOUS COMORBIDITY WITH BODY MASS INDEX (BMI) OF 40.0 TO 44.9 IN ADULT (HCC): ICD-10-CM

## 2023-07-12 DIAGNOSIS — E11.22 CONTROLLED TYPE 2 DIABETES MELLITUS WITH STAGE 3 CHRONIC KIDNEY DISEASE, WITHOUT LONG-TERM CURRENT USE OF INSULIN (HCC): ICD-10-CM

## 2023-07-12 DIAGNOSIS — I10 PRIMARY HYPERTENSION: Primary | ICD-10-CM

## 2023-07-12 DIAGNOSIS — R39.81 FUNCTIONAL INCONTINENCE: ICD-10-CM

## 2023-07-12 DIAGNOSIS — M25.562 CHRONIC PAIN OF BOTH KNEES: ICD-10-CM

## 2023-07-12 DIAGNOSIS — R53.81 DEBILITY: ICD-10-CM

## 2023-07-12 DIAGNOSIS — Z86.718 HISTORY OF DVT (DEEP VEIN THROMBOSIS): ICD-10-CM

## 2023-07-12 DIAGNOSIS — F03.A0 MILD DEMENTIA WITHOUT BEHAVIORAL DISTURBANCE, PSYCHOTIC DISTURBANCE, MOOD DISTURBANCE, OR ANXIETY, UNSPECIFIED DEMENTIA TYPE (HCC): ICD-10-CM

## 2023-07-12 DIAGNOSIS — G89.29 CHRONIC PAIN OF BOTH KNEES: ICD-10-CM

## 2023-07-12 DIAGNOSIS — N18.30 CONTROLLED TYPE 2 DIABETES MELLITUS WITH STAGE 3 CHRONIC KIDNEY DISEASE, WITHOUT LONG-TERM CURRENT USE OF INSULIN (HCC): ICD-10-CM

## 2023-07-12 PROCEDURE — 1123F ACP DISCUSS/DSCN MKR DOCD: CPT | Performed by: FAMILY MEDICINE

## 2023-07-12 PROCEDURE — 1090F PRES/ABSN URINE INCON ASSESS: CPT | Performed by: FAMILY MEDICINE

## 2023-07-12 PROCEDURE — G8427 DOCREV CUR MEDS BY ELIG CLIN: HCPCS | Performed by: FAMILY MEDICINE

## 2023-07-12 PROCEDURE — 3051F HG A1C>EQUAL 7.0%<8.0%: CPT | Performed by: FAMILY MEDICINE

## 2023-07-12 PROCEDURE — G8417 CALC BMI ABV UP PARAM F/U: HCPCS | Performed by: FAMILY MEDICINE

## 2023-07-12 PROCEDURE — 99214 OFFICE O/P EST MOD 30 MIN: CPT | Performed by: FAMILY MEDICINE

## 2023-07-12 PROCEDURE — 1036F TOBACCO NON-USER: CPT | Performed by: FAMILY MEDICINE

## 2023-07-12 RX ORDER — OMEGA-3/DHA/EPA/FISH OIL 300-1000MG
300 CAPSULE ORAL
COMMUNITY
End: 2023-07-12 | Stop reason: ALTCHOICE

## 2023-07-12 RX ORDER — LANOLIN ALCOHOL/MO/W.PET/CERES
3 CREAM (GRAM) TOPICAL DAILY
COMMUNITY

## 2023-07-12 RX ORDER — MULTIVIT-MIN/FOLIC/VIT K/LYCOP 400-300MCG
TABLET ORAL
COMMUNITY
End: 2023-07-12 | Stop reason: ALTCHOICE

## 2023-07-12 RX ORDER — MULTIVIT-MIN/FOLIC/VIT K/LYCOP 400-300MCG
1 TABLET ORAL DAILY
Qty: 90 CAPSULE | Refills: 1
Start: 2023-07-12

## 2023-07-12 SDOH — ECONOMIC STABILITY: INCOME INSECURITY: HOW HARD IS IT FOR YOU TO PAY FOR THE VERY BASICS LIKE FOOD, HOUSING, MEDICAL CARE, AND HEATING?: NOT HARD AT ALL

## 2023-07-12 SDOH — ECONOMIC STABILITY: FOOD INSECURITY: WITHIN THE PAST 12 MONTHS, THE FOOD YOU BOUGHT JUST DIDN'T LAST AND YOU DIDN'T HAVE MONEY TO GET MORE.: NEVER TRUE

## 2023-07-12 SDOH — ECONOMIC STABILITY: FOOD INSECURITY: WITHIN THE PAST 12 MONTHS, YOU WORRIED THAT YOUR FOOD WOULD RUN OUT BEFORE YOU GOT MONEY TO BUY MORE.: NEVER TRUE

## 2023-07-12 SDOH — ECONOMIC STABILITY: HOUSING INSECURITY
IN THE LAST 12 MONTHS, WAS THERE A TIME WHEN YOU DID NOT HAVE A STEADY PLACE TO SLEEP OR SLEPT IN A SHELTER (INCLUDING NOW)?: NO

## 2023-07-12 ASSESSMENT — ANXIETY QUESTIONNAIRES
GAD7 TOTAL SCORE: 0
IF YOU CHECKED OFF ANY PROBLEMS ON THIS QUESTIONNAIRE, HOW DIFFICULT HAVE THESE PROBLEMS MADE IT FOR YOU TO DO YOUR WORK, TAKE CARE OF THINGS AT HOME, OR GET ALONG WITH OTHER PEOPLE: NOT DIFFICULT AT ALL
7. FEELING AFRAID AS IF SOMETHING AWFUL MIGHT HAPPEN: 0
3. WORRYING TOO MUCH ABOUT DIFFERENT THINGS: 0
1. FEELING NERVOUS, ANXIOUS, OR ON EDGE: 0
2. NOT BEING ABLE TO STOP OR CONTROL WORRYING: 0
5. BEING SO RESTLESS THAT IT IS HARD TO SIT STILL: 0
4. TROUBLE RELAXING: 0
6. BECOMING EASILY ANNOYED OR IRRITABLE: 0

## 2023-07-12 ASSESSMENT — ENCOUNTER SYMPTOMS
APNEA: 0
CHEST TIGHTNESS: 0

## 2023-07-12 NOTE — PROGRESS NOTES
1. \"Have you been to the ER, urgent care clinic since your last visit? Hospitalized since your last visit? \" no    2. \"Have you seen or consulted any other health care providers outside of the [address] [place] [address] since your last visit? \" no       Health Maintenance Due   Topic Date Due    COVID-19 Vaccine (1) Never done    Shingles vaccine (2 of 3) 11/29/2016    Pneumococcal 65+ years Vaccine (2 - PPSV23 if available, else PCV20) 08/11/2021    DTaP/Tdap/Td vaccine (2 - Td or Tdap) 04/22/2022
Refill: 1    9. Chronic pain of both knees      Follow-up and Dispositions    Return in about 4 months (around 11/12/2023). I have discussed the diagnosis with the patient and the intended plan as seen in the above orders. Social history, medical history, and labs were reviewed. The patient has received an after-visit summary and questions were answered concerning future plans. I have discussed medication side effects and warnings with the patient as well.     MD ANTONIO Reynoso & MAYANK KIRBY [facility]  07/12/23

## 2023-07-13 LAB
ALBUMIN SERPL-MCNC: 3.1 G/DL (ref 3.5–5)
ALBUMIN/GLOB SERPL: 0.9 (ref 1.1–2.2)
ALP SERPL-CCNC: 70 U/L (ref 45–117)
ALT SERPL-CCNC: 24 U/L (ref 12–78)
ANION GAP SERPL CALC-SCNC: 6 MMOL/L (ref 5–15)
AST SERPL-CCNC: 20 U/L (ref 15–37)
BILIRUB SERPL-MCNC: 0.2 MG/DL (ref 0.2–1)
BUN SERPL-MCNC: 17 MG/DL (ref 6–20)
BUN/CREAT SERPL: 18 (ref 12–20)
CALCIUM SERPL-MCNC: 9.6 MG/DL (ref 8.5–10.1)
CHLORIDE SERPL-SCNC: 106 MMOL/L (ref 97–108)
CHOLEST SERPL-MCNC: 180 MG/DL
CO2 SERPL-SCNC: 26 MMOL/L (ref 21–32)
CREAT SERPL-MCNC: 0.97 MG/DL (ref 0.55–1.02)
ERYTHROCYTE [DISTWIDTH] IN BLOOD BY AUTOMATED COUNT: 13.4 % (ref 11.5–14.5)
GLOBULIN SER CALC-MCNC: 3.5 G/DL (ref 2–4)
GLUCOSE SERPL-MCNC: 225 MG/DL (ref 65–100)
HCT VFR BLD AUTO: 38.6 % (ref 35–47)
HDLC SERPL-MCNC: 38 MG/DL
HDLC SERPL: 4.7 (ref 0–5)
HGB BLD-MCNC: 12 G/DL (ref 11.5–16)
LDLC SERPL CALC-MCNC: 100.4 MG/DL (ref 0–100)
MCH RBC QN AUTO: 31.4 PG (ref 26–34)
MCHC RBC AUTO-ENTMCNC: 31.1 G/DL (ref 30–36.5)
MCV RBC AUTO: 101 FL (ref 80–99)
NRBC # BLD: 0 K/UL (ref 0–0.01)
NRBC BLD-RTO: 0 PER 100 WBC
PLATELET # BLD AUTO: 289 K/UL (ref 150–400)
PMV BLD AUTO: 10.9 FL (ref 8.9–12.9)
POTASSIUM SERPL-SCNC: 4.3 MMOL/L (ref 3.5–5.1)
PROT SERPL-MCNC: 6.6 G/DL (ref 6.4–8.2)
RBC # BLD AUTO: 3.82 M/UL (ref 3.8–5.2)
SODIUM SERPL-SCNC: 138 MMOL/L (ref 136–145)
TRIGL SERPL-MCNC: 208 MG/DL
VLDLC SERPL CALC-MCNC: 41.6 MG/DL
WBC # BLD AUTO: 6.3 K/UL (ref 3.6–11)

## 2023-07-14 LAB
EST. AVERAGE GLUCOSE BLD GHB EST-MCNC: 166 MG/DL
HBA1C MFR BLD: 7.4 % (ref 4–5.6)

## 2023-11-15 ENCOUNTER — OFFICE VISIT (OUTPATIENT)
Facility: CLINIC | Age: 88
End: 2023-11-15
Payer: MEDICARE

## 2023-11-15 VITALS
RESPIRATION RATE: 16 BRPM | BODY MASS INDEX: 40.59 KG/M2 | HEIGHT: 61 IN | SYSTOLIC BLOOD PRESSURE: 127 MMHG | WEIGHT: 215 LBS | OXYGEN SATURATION: 97 % | TEMPERATURE: 97.3 F | DIASTOLIC BLOOD PRESSURE: 81 MMHG | HEART RATE: 83 BPM

## 2023-11-15 DIAGNOSIS — N18.30 CONTROLLED TYPE 2 DIABETES MELLITUS WITH STAGE 3 CHRONIC KIDNEY DISEASE, WITHOUT LONG-TERM CURRENT USE OF INSULIN (HCC): ICD-10-CM

## 2023-11-15 DIAGNOSIS — I10 PRIMARY HYPERTENSION: ICD-10-CM

## 2023-11-15 DIAGNOSIS — E66.01 CLASS 3 SEVERE OBESITY DUE TO EXCESS CALORIES WITH SERIOUS COMORBIDITY AND BODY MASS INDEX (BMI) OF 40.0 TO 44.9 IN ADULT (HCC): ICD-10-CM

## 2023-11-15 DIAGNOSIS — M25.561 CHRONIC PAIN OF BOTH KNEES: ICD-10-CM

## 2023-11-15 DIAGNOSIS — R53.81 DEBILITY: ICD-10-CM

## 2023-11-15 DIAGNOSIS — E11.22 CONTROLLED TYPE 2 DIABETES MELLITUS WITH STAGE 3 CHRONIC KIDNEY DISEASE, WITHOUT LONG-TERM CURRENT USE OF INSULIN (HCC): ICD-10-CM

## 2023-11-15 DIAGNOSIS — F03.A0 MILD DEMENTIA WITHOUT BEHAVIORAL DISTURBANCE, PSYCHOTIC DISTURBANCE, MOOD DISTURBANCE, OR ANXIETY, UNSPECIFIED DEMENTIA TYPE (HCC): ICD-10-CM

## 2023-11-15 DIAGNOSIS — G89.29 CHRONIC PAIN OF BOTH KNEES: ICD-10-CM

## 2023-11-15 DIAGNOSIS — Z00.00 MEDICARE ANNUAL WELLNESS VISIT, SUBSEQUENT: Primary | ICD-10-CM

## 2023-11-15 DIAGNOSIS — M25.562 CHRONIC PAIN OF BOTH KNEES: ICD-10-CM

## 2023-11-15 DIAGNOSIS — R35.0 URINARY FREQUENCY: ICD-10-CM

## 2023-11-15 PROCEDURE — 1090F PRES/ABSN URINE INCON ASSESS: CPT | Performed by: FAMILY MEDICINE

## 2023-11-15 PROCEDURE — G8484 FLU IMMUNIZE NO ADMIN: HCPCS | Performed by: FAMILY MEDICINE

## 2023-11-15 PROCEDURE — G8417 CALC BMI ABV UP PARAM F/U: HCPCS | Performed by: FAMILY MEDICINE

## 2023-11-15 PROCEDURE — G8427 DOCREV CUR MEDS BY ELIG CLIN: HCPCS | Performed by: FAMILY MEDICINE

## 2023-11-15 PROCEDURE — 99214 OFFICE O/P EST MOD 30 MIN: CPT | Performed by: FAMILY MEDICINE

## 2023-11-15 PROCEDURE — 1123F ACP DISCUSS/DSCN MKR DOCD: CPT | Performed by: FAMILY MEDICINE

## 2023-11-15 PROCEDURE — G0439 PPPS, SUBSEQ VISIT: HCPCS | Performed by: FAMILY MEDICINE

## 2023-11-15 PROCEDURE — 3051F HG A1C>EQUAL 7.0%<8.0%: CPT | Performed by: FAMILY MEDICINE

## 2023-11-15 PROCEDURE — 1036F TOBACCO NON-USER: CPT | Performed by: FAMILY MEDICINE

## 2023-11-15 ASSESSMENT — PATIENT HEALTH QUESTIONNAIRE - PHQ9
SUM OF ALL RESPONSES TO PHQ QUESTIONS 1-9: 0
1. LITTLE INTEREST OR PLEASURE IN DOING THINGS: 0
SUM OF ALL RESPONSES TO PHQ QUESTIONS 1-9: 0
SUM OF ALL RESPONSES TO PHQ9 QUESTIONS 1 & 2: 0
SUM OF ALL RESPONSES TO PHQ QUESTIONS 1-9: 0
SUM OF ALL RESPONSES TO PHQ QUESTIONS 1-9: 0
2. FEELING DOWN, DEPRESSED OR HOPELESS: 0

## 2023-11-15 ASSESSMENT — ENCOUNTER SYMPTOMS
BACK PAIN: 0
CHEST TIGHTNESS: 0

## 2023-11-15 ASSESSMENT — LIFESTYLE VARIABLES
HOW OFTEN DO YOU HAVE A DRINK CONTAINING ALCOHOL: NEVER
HOW MANY STANDARD DRINKS CONTAINING ALCOHOL DO YOU HAVE ON A TYPICAL DAY: PATIENT DOES NOT DRINK

## 2023-11-15 NOTE — PROGRESS NOTES
[facility]    History of Present Illness:   Kirsten Bills is a 80 y.o. female with history of HTN, Tubular adenoma, Meningioma, MID, Allergies, Cholelithiasis  CC: Follow up  History provided by patient and Records    HPI:  Functional Incontinence:  Patient has had issues with urination at this time. Significant increase in urinating on self and reports does not know when she has to go to the bathroom    Debility: Secondary to knee pains at this time still. Minimal walking, can transfer. Hypertension Follow up: The patient reports:  taking medications as instructed, no medication side effects noted, no TIA's, no chest pain on exertion, no dyspnea on exertion, no swelling of ankles, no orthostatic dizziness or lightheadedness, no orthopnea or paroxysmal nocturnal dyspnea. BP Readings from Last 3 Encounters:   11/15/23 127/81   07/12/23 (!) 140/80   04/12/23 137/68      Diabetes Follow up: Overall the patient feels well with good energy level. Current Medications: This patient does not have an active medication from one of the medication groupers. .   Insulin dependence: No              Frequency of home glucose testing: PRN              Blood Sugar range at home: N/A                  Last eye exam: In past 12 months. Last foot exam: This year.               Polyuria, polyphagia or polydipsia: No              Retinopathy: No              Neuropathy SX: No              Low blood sugar symptoms: No              Dietary compliance: compliant most of the time              Medication compliance: compliant most of the time              On ASA: No              Tobacco Use: No              Depression: No     Wt Readings from Last 3 Encounters:   11/15/23 97.5 kg (215 lb)   04/12/23 100.2 kg (221 lb)   01/12/23 101.6 kg (224 lb)      No results found for: \"HBA1C\", \"UUM0CXUM\"   No results found for: \"MCA2\", \"MCAU\", \"MCAU2\"    No results found for: \"LUNA\", \"CREAPOC\", \"CREA\"

## 2023-11-15 NOTE — PROGRESS NOTES
Medicare Annual Wellness Visit    Juan José Adler is here for Medicare AWV    Assessment & Plan   Medicare annual wellness visit, subsequent  Controlled type 2 diabetes mellitus with stage 3 chronic kidney disease, without long-term current use of insulin ([address])  -     Hemoglobin A1C; Future  -     Lipid Panel; Future  Primary hypertension  -     Comprehensive Metabolic Panel; Future  -     CBC; Future  Mild dementia without behavioral disturbance, psychotic disturbance, mood disturbance, or anxiety, unspecified dementia type (HCC)  Class 3 severe obesity due to excess calories with serious comorbidity and body mass index (BMI) of 40.0 to 44.9 in [facility])  Chronic pain of both knees  Debility  Urinary frequency    Recommendations for Preventive Services Due: see orders and patient instructions/AVS.  Recommended screening schedule for the next 5-10 years is provided to the patient in written form: see Patient Instructions/AVS.     Return in about 4 months (around 3/15/2024). Subjective     Patient's complete Health Risk Assessment and screening values have been reviewed and are found in Flowsheets. The following problems were reviewed today and where indicated follow up appointments were made and/or referrals ordered. Positive Risk Factor Screenings with Interventions:    Fall Risk:  Do you feel unsteady or are you worried about falling? : no  2 or more falls in past year?: (!) yes  Fall with injury in past year?: no     Interventions:    Naty mendoza interventions    Cognitive:    Words recalled: 0 Words Recalled   Clock Drawing Test (CDT): Normal   Total Score: (!) 2   Total Score Interpretation: Abnormal Mini-Cog      Interventions:  Dementia             Weight and Activity:  Physical Activity: Inactive (11/15/2023)    Exercise Vital Sign     Days of Exercise per Week: 0 days     Minutes of Exercise per Session: 0 min     On average, how many days per week do you engage in moderate to strenuous exercise no

## 2023-11-15 NOTE — PATIENT INSTRUCTIONS
benefits include a comprehensive review of your medical history including lifestyle, illnesses that may run in your family, and various assessments and screenings as appropriate. After reviewing your medical record and screening and assessments performed today your provider may have ordered immunizations, labs, imaging, and/or referrals for you. A list of these orders (if applicable) as well as your Preventive Care list are included within your After Visit Summary for your review. Other Preventive Recommendations:    A preventive eye exam performed by an eye specialist is recommended every 1-2 years to screen for glaucoma; cataracts, macular degeneration, and other eye disorders. A preventive dental visit is recommended every 6 months. Try to get at least 150 minutes of exercise per week or 10,000 steps per day on a pedometer . Order or download the FREE \"Exercise & Physical Activity: Your Everyday Guide\" from [employer]. Call 9-560.286.5427 or search [employer] online. You need 8807-0301 mg of calcium and 4843-3649 IU of vitamin D per day. It is possible to meet your calcium requirement with diet alone, but a vitamin D supplement is usually necessary to meet this goal.  When exposed to the sun, use a sunscreen that protects against both UVA and UVB radiation with an SPF of 30 or greater. Reapply every 2 to 3 hours or after sweating, drying off with a towel, or swimming. Always wear a seat belt when traveling in a car. Always wear a helmet when riding a bicycle or motorcycle.

## 2023-11-16 LAB
ALBUMIN SERPL-MCNC: 3.2 G/DL (ref 3.5–5)
ALBUMIN/GLOB SERPL: 0.8 (ref 1.1–2.2)
ALP SERPL-CCNC: 72 U/L (ref 45–117)
ALT SERPL-CCNC: 23 U/L (ref 12–78)
ANION GAP SERPL CALC-SCNC: 7 MMOL/L (ref 5–15)
AST SERPL-CCNC: 19 U/L (ref 15–37)
BILIRUB SERPL-MCNC: 0.3 MG/DL (ref 0.2–1)
BUN SERPL-MCNC: 18 MG/DL (ref 6–20)
BUN/CREAT SERPL: 17 (ref 12–20)
CALCIUM SERPL-MCNC: 9.4 MG/DL (ref 8.5–10.1)
CHLORIDE SERPL-SCNC: 105 MMOL/L (ref 97–108)
CHOLEST SERPL-MCNC: 188 MG/DL
CO2 SERPL-SCNC: 26 MMOL/L (ref 21–32)
CREAT SERPL-MCNC: 1.03 MG/DL (ref 0.55–1.02)
ERYTHROCYTE [DISTWIDTH] IN BLOOD BY AUTOMATED COUNT: 13.5 % (ref 11.5–14.5)
EST. AVERAGE GLUCOSE BLD GHB EST-MCNC: 194 MG/DL
GLOBULIN SER CALC-MCNC: 3.8 G/DL (ref 2–4)
GLUCOSE SERPL-MCNC: 252 MG/DL (ref 65–100)
HBA1C MFR BLD: 8.4 % (ref 4–5.6)
HCT VFR BLD AUTO: 40.6 % (ref 35–47)
HDLC SERPL-MCNC: 37 MG/DL
HDLC SERPL: 5.1 (ref 0–5)
HGB BLD-MCNC: 12.7 G/DL (ref 11.5–16)
LDLC SERPL CALC-MCNC: 101 MG/DL (ref 0–100)
MCH RBC QN AUTO: 30 PG (ref 26–34)
MCHC RBC AUTO-ENTMCNC: 31.3 G/DL (ref 30–36.5)
MCV RBC AUTO: 95.8 FL (ref 80–99)
NRBC # BLD: 0 K/UL (ref 0–0.01)
NRBC BLD-RTO: 0 PER 100 WBC
PLATELET # BLD AUTO: 303 K/UL (ref 150–400)
PMV BLD AUTO: 11 FL (ref 8.9–12.9)
POTASSIUM SERPL-SCNC: 4.4 MMOL/L (ref 3.5–5.1)
PROT SERPL-MCNC: 7 G/DL (ref 6.4–8.2)
RBC # BLD AUTO: 4.24 M/UL (ref 3.8–5.2)
SODIUM SERPL-SCNC: 138 MMOL/L (ref 136–145)
TRIGL SERPL-MCNC: 250 MG/DL
VLDLC SERPL CALC-MCNC: 50 MG/DL
WBC # BLD AUTO: 6.2 K/UL (ref 3.6–11)

## 2024-01-18 ENCOUNTER — OFFICE VISIT (OUTPATIENT)
Facility: CLINIC | Age: 89
End: 2024-01-18
Payer: MEDICARE

## 2024-01-18 VITALS
DIASTOLIC BLOOD PRESSURE: 86 MMHG | HEART RATE: 86 BPM | SYSTOLIC BLOOD PRESSURE: 142 MMHG | TEMPERATURE: 97 F | HEIGHT: 61 IN | WEIGHT: 213.6 LBS | RESPIRATION RATE: 18 BRPM | BODY MASS INDEX: 40.33 KG/M2

## 2024-01-18 DIAGNOSIS — E66.01 OBESITY, CLASS III, BMI 40-49.9 (MORBID OBESITY) (HCC): ICD-10-CM

## 2024-01-18 DIAGNOSIS — D32.9 MENINGIOMA (HCC): ICD-10-CM

## 2024-01-18 DIAGNOSIS — F03.A0 MILD DEMENTIA WITHOUT BEHAVIORAL DISTURBANCE, PSYCHOTIC DISTURBANCE, MOOD DISTURBANCE, OR ANXIETY, UNSPECIFIED DEMENTIA TYPE (HCC): ICD-10-CM

## 2024-01-18 DIAGNOSIS — E11.22 CONTROLLED TYPE 2 DIABETES MELLITUS WITH STAGE 3 CHRONIC KIDNEY DISEASE, WITHOUT LONG-TERM CURRENT USE OF INSULIN (HCC): ICD-10-CM

## 2024-01-18 DIAGNOSIS — I10 PRIMARY HYPERTENSION: Primary | ICD-10-CM

## 2024-01-18 DIAGNOSIS — N18.30 CONTROLLED TYPE 2 DIABETES MELLITUS WITH STAGE 3 CHRONIC KIDNEY DISEASE, WITHOUT LONG-TERM CURRENT USE OF INSULIN (HCC): ICD-10-CM

## 2024-01-18 PROCEDURE — G8427 DOCREV CUR MEDS BY ELIG CLIN: HCPCS | Performed by: FAMILY MEDICINE

## 2024-01-18 PROCEDURE — 1090F PRES/ABSN URINE INCON ASSESS: CPT | Performed by: FAMILY MEDICINE

## 2024-01-18 PROCEDURE — 1036F TOBACCO NON-USER: CPT | Performed by: FAMILY MEDICINE

## 2024-01-18 PROCEDURE — G8417 CALC BMI ABV UP PARAM F/U: HCPCS | Performed by: FAMILY MEDICINE

## 2024-01-18 PROCEDURE — 99213 OFFICE O/P EST LOW 20 MIN: CPT | Performed by: FAMILY MEDICINE

## 2024-01-18 PROCEDURE — G8484 FLU IMMUNIZE NO ADMIN: HCPCS | Performed by: FAMILY MEDICINE

## 2024-01-18 PROCEDURE — 1123F ACP DISCUSS/DSCN MKR DOCD: CPT | Performed by: FAMILY MEDICINE

## 2024-01-18 ASSESSMENT — ENCOUNTER SYMPTOMS
ABDOMINAL PAIN: 0
CHEST TIGHTNESS: 0

## 2024-01-18 NOTE — PROGRESS NOTES
[facility]    History of Present Illness:   Misael Myers is a 89 y.o. female with history of HTN, Diabetes, Meningioma,   CC: Follow up, Diabetes  History provided by patient and Records    HPI:  Hypertension Follow up:  The patient reports:  taking medications as instructed, no medication side effects noted, no TIA's, no chest pain on exertion, no dyspnea on exertion, no swelling of ankles, no orthostatic dizziness or lightheadedness, no orthopnea or paroxysmal nocturnal dyspnea.     BP Readings from Last 3 Encounters:   01/18/24 (!) 142/86   11/15/23 127/81   07/12/23 (!) 140/80      Rash: Noting erythema and some tenderness in the labial area, redness per family.  No fevers chills.    Dementia: Persistent.    Diabetes Follow up: Family wants to try Gluco-6   Current Medications: This patient does not have an active medication from one of the medication groupers.  Insulin dependence: No   Frequency of home glucose testing: PRN   Blood Sugar range at home: None    Last eye exam: In past 12 months.   Last foot exam: This year.   Polyuria, polyphagia or polydipsia: Yes   Retinopathy: No   Neuropathy SX: No   Low blood sugar symptoms: No   Dietary compliance: compliant most of the time   Medication compliance: compliant most of the time   On ASA: No   Tobacco Use: No   Depression: No     Wt Readings from Last 3 Encounters:   01/18/24 96.9 kg (213 lb 9.6 oz)   11/15/23 97.5 kg (215 lb)   04/12/23 100.2 kg (221 lb)     Hemoglobin A1C   Date Value Ref Range Status   11/15/2023 8.4 (H) 4.0 - 5.6 % Final     Comment:     (NOTE)  HbA1C Interpretive Ranges  <5.7              Normal  5.7 - 6.4         Consider Prediabetes  >6.5              Consider Diabetes          Health Maintenance  Health Maintenance Due   Topic Date Due    Respiratory Syncytial Virus (RSV) Pregnant or age 60 yrs+ (1 - 1-dose 60+ series) Never done    Annual Wellness Visit (Medicare Advantage)  01/01/2024       Past Medical,

## 2024-01-18 NOTE — PROGRESS NOTES
1. \"Have you been to the ER, urgent care clinic since your last visit?  Hospitalized since your last visit?\" no    2. \"Have you seen or consulted any other health care providers outside of the [facility] since your last visit?\" no     Health Maintenance Due   Topic Date Due    COVID-19 Vaccine (1) Never done    Respiratory Syncytial Virus (RSV) Pregnant or age 60 yrs+ (1 - 1-dose 60+ series) Never done    Annual Wellness Visit (Medicare Advantage)  01/01/2024

## 2024-01-23 ENCOUNTER — TELEPHONE (OUTPATIENT)
Facility: CLINIC | Age: 89
End: 2024-01-23

## 2024-01-23 DIAGNOSIS — B36.9 FUNGAL SKIN INFECTION: Primary | ICD-10-CM

## 2024-01-23 RX ORDER — CLOTRIMAZOLE AND BETAMETHASONE DIPROPIONATE 10; .64 MG/G; MG/G
1 CREAM TOPICAL 2 TIMES DAILY
Qty: 45 G | Refills: 1 | Status: SHIPPED | OUTPATIENT
Start: 2024-01-23

## 2024-01-23 NOTE — TELEPHONE ENCOUNTER
Images received, reviewed, concern for fungal skin infection.    Noé Scott MD  [facility]  01/23/24

## 2024-03-18 ENCOUNTER — OFFICE VISIT (OUTPATIENT)
Facility: CLINIC | Age: 89
End: 2024-03-18
Payer: MEDICARE

## 2024-03-18 VITALS
SYSTOLIC BLOOD PRESSURE: 134 MMHG | WEIGHT: 208.8 LBS | OXYGEN SATURATION: 94 % | RESPIRATION RATE: 18 BRPM | DIASTOLIC BLOOD PRESSURE: 81 MMHG | TEMPERATURE: 97.5 F | HEIGHT: 61 IN | BODY MASS INDEX: 39.42 KG/M2 | HEART RATE: 77 BPM

## 2024-03-18 DIAGNOSIS — E66.01 CLASS 3 SEVERE OBESITY DUE TO EXCESS CALORIES WITH SERIOUS COMORBIDITY AND BODY MASS INDEX (BMI) OF 40.0 TO 44.9 IN ADULT (HCC): ICD-10-CM

## 2024-03-18 DIAGNOSIS — K42.9 UMBILICAL HERNIA WITHOUT OBSTRUCTION AND WITHOUT GANGRENE: ICD-10-CM

## 2024-03-18 DIAGNOSIS — E11.22 CONTROLLED TYPE 2 DIABETES MELLITUS WITH STAGE 3 CHRONIC KIDNEY DISEASE, WITHOUT LONG-TERM CURRENT USE OF INSULIN (HCC): ICD-10-CM

## 2024-03-18 DIAGNOSIS — M25.562 CHRONIC PAIN OF BOTH KNEES: ICD-10-CM

## 2024-03-18 DIAGNOSIS — B36.9 FUNGAL SKIN INFECTION: ICD-10-CM

## 2024-03-18 DIAGNOSIS — N18.30 CONTROLLED TYPE 2 DIABETES MELLITUS WITH STAGE 3 CHRONIC KIDNEY DISEASE, WITHOUT LONG-TERM CURRENT USE OF INSULIN (HCC): ICD-10-CM

## 2024-03-18 DIAGNOSIS — E66.01 CLASS 3 SEVERE OBESITY DUE TO EXCESS CALORIES WITHOUT SERIOUS COMORBIDITY WITH BODY MASS INDEX (BMI) OF 40.0 TO 44.9 IN ADULT (HCC): ICD-10-CM

## 2024-03-18 DIAGNOSIS — J30.1 SEASONAL ALLERGIC RHINITIS DUE TO POLLEN: ICD-10-CM

## 2024-03-18 DIAGNOSIS — I10 PRIMARY HYPERTENSION: ICD-10-CM

## 2024-03-18 DIAGNOSIS — E78.2 MIXED HYPERLIPIDEMIA: ICD-10-CM

## 2024-03-18 DIAGNOSIS — Z00.00 MEDICARE ANNUAL WELLNESS VISIT, SUBSEQUENT: Primary | ICD-10-CM

## 2024-03-18 DIAGNOSIS — R53.81 DEBILITY: ICD-10-CM

## 2024-03-18 DIAGNOSIS — M25.561 CHRONIC PAIN OF BOTH KNEES: ICD-10-CM

## 2024-03-18 DIAGNOSIS — F03.A0 MILD DEMENTIA WITHOUT BEHAVIORAL DISTURBANCE, PSYCHOTIC DISTURBANCE, MOOD DISTURBANCE, OR ANXIETY, UNSPECIFIED DEMENTIA TYPE (HCC): ICD-10-CM

## 2024-03-18 DIAGNOSIS — G89.29 CHRONIC PAIN OF BOTH KNEES: ICD-10-CM

## 2024-03-18 PROCEDURE — G0439 PPPS, SUBSEQ VISIT: HCPCS | Performed by: FAMILY MEDICINE

## 2024-03-18 PROCEDURE — G8417 CALC BMI ABV UP PARAM F/U: HCPCS | Performed by: FAMILY MEDICINE

## 2024-03-18 PROCEDURE — 1036F TOBACCO NON-USER: CPT | Performed by: FAMILY MEDICINE

## 2024-03-18 PROCEDURE — 1123F ACP DISCUSS/DSCN MKR DOCD: CPT | Performed by: FAMILY MEDICINE

## 2024-03-18 PROCEDURE — 99214 OFFICE O/P EST MOD 30 MIN: CPT | Performed by: FAMILY MEDICINE

## 2024-03-18 PROCEDURE — G8484 FLU IMMUNIZE NO ADMIN: HCPCS | Performed by: FAMILY MEDICINE

## 2024-03-18 PROCEDURE — G8427 DOCREV CUR MEDS BY ELIG CLIN: HCPCS | Performed by: FAMILY MEDICINE

## 2024-03-18 PROCEDURE — 1090F PRES/ABSN URINE INCON ASSESS: CPT | Performed by: FAMILY MEDICINE

## 2024-03-18 RX ORDER — CLOTRIMAZOLE AND BETAMETHASONE DIPROPIONATE 10; .64 MG/G; MG/G
1 CREAM TOPICAL 2 TIMES DAILY
Qty: 45 G | Refills: 1 | Status: SHIPPED | OUTPATIENT
Start: 2024-03-18

## 2024-03-18 ASSESSMENT — ENCOUNTER SYMPTOMS
WHEEZING: 0
CHEST TIGHTNESS: 0

## 2024-03-18 ASSESSMENT — LIFESTYLE VARIABLES
HOW OFTEN DO YOU HAVE A DRINK CONTAINING ALCOHOL: PATIENT UNABLE TO ANSWER
HOW MANY STANDARD DRINKS CONTAINING ALCOHOL DO YOU HAVE ON A TYPICAL DAY: PATIENT UNABLE TO ANSWER

## 2024-03-18 ASSESSMENT — PATIENT HEALTH QUESTIONNAIRE - PHQ9
SUM OF ALL RESPONSES TO PHQ QUESTIONS 1-9: 0
1. LITTLE INTEREST OR PLEASURE IN DOING THINGS: NOT AT ALL
DEPRESSION UNABLE TO ASSESS: FUNCTIONAL CAPACITY MOTIVATION LIMITS ACCURACY
2. FEELING DOWN, DEPRESSED OR HOPELESS: NOT AT ALL
SUM OF ALL RESPONSES TO PHQ QUESTIONS 1-9: 0
SUM OF ALL RESPONSES TO PHQ9 QUESTIONS 1 & 2: 0

## 2024-03-18 NOTE — PROGRESS NOTES
1. \"Have you been to the ER, urgent care clinic since your last visit?  Hospitalized since your last visit?\" no    2. \"Have you seen or consulted any other health care providers outside of the [facility] since your last visit?\" no         Health Maintenance Due   Topic Date Due    Respiratory Syncytial Virus (RSV) Pregnant or age 60 yrs+ (1 - 1-dose 60+ series) Never done    Annual Wellness Visit (Medicare Advantage)  01/01/2024      
[facility]    History of Present Illness:   Misael Myers is a 89 y.o. female with history of HTN, Diabetes, Meningioma   CC: Medicare/Follow up  History provided by patient and Records    HPI:  Hypertension Follow up:  The patient reports:  taking medications as instructed, no medication side effects noted, no TIA's, no chest pain on exertion, no dyspnea on exertion, no swelling of ankles, no orthostatic dizziness or lightheadedness, no orthopnea or paroxysmal nocturnal dyspnea.     BP Readings from Last 3 Encounters:   03/18/24 134/81   01/18/24 (!) 142/86   11/15/23 127/81      Abdominal Wall hernia: Noting hernia on the front abdomen, does express with transfers.    Dementia: Noting that she is not reading much now, is watching a TV.  Some speaking.  Patient overall though is stable, eating well, though slow weight loss.    Rash: noting Rash in the labial area is improved, no longer painful, though notes some persistent redness.    Diabetes Follow up: Family wants to try Gluco-6              Current Medications: This patient does not have an active medication from one of the medication groupers.  Insulin dependence: No              Frequency of home glucose testing: PRN              Blood Sugar range at home: None                Last eye exam: In past 12 months.              Last foot exam: This year.              Polyuria, polyphagia or polydipsia: Yes              Retinopathy: No              Neuropathy SX: No              Low blood sugar symptoms: No              Dietary compliance: compliant most of the time              Medication compliance: compliant most of the time              On ASA: No              Tobacco Use: No              Depression: No     Wt Readings from Last 3 Encounters:   03/18/24 94.7 kg (208 lb 12.8 oz)   01/18/24 96.9 kg (213 lb 9.6 oz)   11/15/23 97.5 kg (215 lb)        Hemoglobin A1C   Date Value Ref Range Status   11/15/2023 8.4 (H) 4.0 - 5.6 % Final     
moderately severe   20-27 severe              Activity, Diet, and Weight:  On average, how many days per week do you engage in moderate to strenuous exercise (like a brisk walk)?: Patient unable to answer  On average, how many minutes do you engage in exercise at this level?: Patient unable to answer         Body mass index is 39.45 kg/m². (!) Abnormal    Obesity Interventions:  Dementia          Vision Screen:  Do you have difficulty driving, watching TV, or doing any of your daily activities because of your eyesight?: No  Have you had an eye exam within the past year?: (!) No  No results found.    Interventions:   Patient encouraged to make appointment with their eye specialist     ADL's:   Patient reports needing help with:  Select all that apply: (!) Dressing, Grooming, Bathing, Toileting, Walking/Balance  Interventions:  Gilda                Objective   Vitals:    03/18/24 1129   BP: 134/81   Site: Left Lower Arm   Position: Sitting   Cuff Size: Medium Adult   Pulse: 77   Resp: 18   Temp: 97.5 °F (36.4 °C)   TempSrc: Temporal   SpO2: 94%   Weight: 94.7 kg (208 lb 12.8 oz)   Height: 1.549 m (5' 1\")      Body mass index is 39.45 kg/m².               No Known Allergies  Prior to Visit Medications    Medication Sig Taking? Authorizing Provider   clotrimazole-betamethasone (LOTRISONE) 1-0.05 % cream Apply 1 g topically 2 times daily Apply topically 2 times daily. Yes Noé Scott MD   Glucosamine-Chondroitin (MOVE FREE PO) Take by mouth Yes Xavier Ruelas MD   furosemide (LASIX) 20 MG tablet Take 1 tablet by mouth daily as needed (leg swelling) Yes Noé Scott MD   melatonin 3 MG TABS tablet Take 1 tablet by mouth daily Yes Xavier Ruelas MD   LUTEIN PO Take by mouth Yes Xavier Ruelas MD   Cranberry, Vacc oxycoccus, (CRANBERRY EXTRACT) 200 MG CAPS Take 1 capsule by mouth daily Yes Noé Scott MD   acetaminophen (TYLENOL) 325 MG tablet Take 2 tablets by mouth

## 2024-03-19 LAB
ALBUMIN SERPL-MCNC: 3.2 G/DL (ref 3.5–5)
ALBUMIN/GLOB SERPL: 0.9 (ref 1.1–2.2)
ALP SERPL-CCNC: 74 U/L (ref 45–117)
ALT SERPL-CCNC: 25 U/L (ref 12–78)
ANION GAP SERPL CALC-SCNC: 8 MMOL/L (ref 5–15)
AST SERPL-CCNC: 18 U/L (ref 15–37)
BILIRUB SERPL-MCNC: 0.3 MG/DL (ref 0.2–1)
BUN SERPL-MCNC: 15 MG/DL (ref 6–20)
BUN/CREAT SERPL: 14 (ref 12–20)
CALCIUM SERPL-MCNC: 9.5 MG/DL (ref 8.5–10.1)
CHLORIDE SERPL-SCNC: 103 MMOL/L (ref 97–108)
CHOLEST SERPL-MCNC: 180 MG/DL
CO2 SERPL-SCNC: 25 MMOL/L (ref 21–32)
CREAT SERPL-MCNC: 1.06 MG/DL (ref 0.55–1.02)
ERYTHROCYTE [DISTWIDTH] IN BLOOD BY AUTOMATED COUNT: 13.2 % (ref 11.5–14.5)
EST. AVERAGE GLUCOSE BLD GHB EST-MCNC: 240 MG/DL
GLOBULIN SER CALC-MCNC: 3.7 G/DL (ref 2–4)
GLUCOSE SERPL-MCNC: 296 MG/DL (ref 65–100)
HBA1C MFR BLD: 10 % (ref 4–5.6)
HCT VFR BLD AUTO: 40.8 % (ref 35–47)
HDLC SERPL-MCNC: 42 MG/DL
HDLC SERPL: 4.3 (ref 0–5)
HGB BLD-MCNC: 13.1 G/DL (ref 11.5–16)
LDLC SERPL CALC-MCNC: 95.4 MG/DL (ref 0–100)
MCH RBC QN AUTO: 30.7 PG (ref 26–34)
MCHC RBC AUTO-ENTMCNC: 32.1 G/DL (ref 30–36.5)
MCV RBC AUTO: 95.6 FL (ref 80–99)
NRBC # BLD: 0 K/UL (ref 0–0.01)
NRBC BLD-RTO: 0 PER 100 WBC
PLATELET # BLD AUTO: 297 K/UL (ref 150–400)
PMV BLD AUTO: 11 FL (ref 8.9–12.9)
POTASSIUM SERPL-SCNC: 4.2 MMOL/L (ref 3.5–5.1)
PROT SERPL-MCNC: 6.9 G/DL (ref 6.4–8.2)
RBC # BLD AUTO: 4.27 M/UL (ref 3.8–5.2)
SODIUM SERPL-SCNC: 136 MMOL/L (ref 136–145)
TRIGL SERPL-MCNC: 213 MG/DL
VLDLC SERPL CALC-MCNC: 42.6 MG/DL
WBC # BLD AUTO: 8.2 K/UL (ref 3.6–11)

## 2024-04-12 ENCOUNTER — HOSPITAL ENCOUNTER (OUTPATIENT)
Facility: HOSPITAL | Age: 89
Setting detail: RECURRING SERIES
Discharge: HOME | End: 2024-04-15
Payer: MEDICARE

## 2024-04-12 PROCEDURE — 97802 MEDICAL NUTRITION INDIV IN: CPT

## 2024-04-12 NOTE — PROGRESS NOTES
LABGLOM 50 (L) 03/18/2024    GFRAA >60 03/16/2022    AGRATIO 0.9 (L) 03/18/2024    GLOB 3.7 03/18/2024     Lab Results   Component Value Date    CHOL 180 03/18/2024    CHOL 188 11/15/2023    CHOL 180 07/12/2023     Lab Results   Component Value Date    TRIG 213 (H) 03/18/2024    TRIG 250 (H) 11/15/2023    TRIG 208 (H) 07/12/2023     Lab Results   Component Value Date    HDL 42 03/18/2024    HDL 37 11/15/2023    HDL 38 07/12/2023     Lab Results   Component Value Date    LDLCALC 95.4 03/18/2024    LDLCALC 101 (H) 11/15/2023    LDLCALC 100.4 (H) 07/12/2023     No results found for: \"VLDL\"  Lab Results   Component Value Date    CHOLHDLRATIO 4.3 03/18/2024    CHOLHDLRATIO 5.1 (H) 11/15/2023    CHOLHDLRATIO 4.7 07/12/2023          Assessment:   [place] is a 89 y.o. female seen today for type 2 diabetes. Pt recent lab results show A1c 10.0 (H) and Glu 296 (H). Pt daughter, Dolores, is on the call today. Pt daughter noted that they have concerns for increasing A1c levels. Pt is not currently on any medication for diabetes and they have not been recommended to check blood sugar levels.   Pt is cared for by Dolores and her sister in law. Pt is very sedentary and is unable to walk or stand for more than a few moments. Pt maintains a good appetite with no noted chewing or swallowing difficulty. Pt is typically eating well and is fed at regular meal times. Pt daughter noted that pt has recently been getting more juice drinks in the day, pt also prefers very sweet tea. They recently made the switch to Truvia for sweetener.   Pt daughter is interested in knowing better how to plan and make up meals that will not cause more increase in the A1c.       Food & Nutrition: B- 2 eggs, 1 piece turkey sausage, yogurt (flavored) Dannon Light and Fit, Orange Drink   S- 1/2 Banana   L- Wilder w/PBJ or Tuna , Leftovers   S- Cheese and grapes  D- Fish (salmon, Slye, flounder), green beans, cottage cheese, coleslaw, squash

## 2024-05-10 ENCOUNTER — TELEPHONE (OUTPATIENT)
Facility: CLINIC | Age: 89
End: 2024-05-10

## 2024-05-10 DIAGNOSIS — I10 PRIMARY HYPERTENSION: ICD-10-CM

## 2024-05-10 DIAGNOSIS — K59.00 CONSTIPATION, UNSPECIFIED CONSTIPATION TYPE: Primary | ICD-10-CM

## 2024-05-10 RX ORDER — LACTULOSE 10 G/15ML
30 SOLUTION ORAL 2 TIMES DAILY
Qty: 946 ML | Refills: 1 | Status: SHIPPED | OUTPATIENT
Start: 2024-05-10

## 2024-05-10 RX ORDER — FUROSEMIDE 20 MG/1
20 TABLET ORAL DAILY PRN
Qty: 90 TABLET | Refills: 1 | Status: SHIPPED | OUTPATIENT
Start: 2024-05-10

## 2024-05-10 NOTE — TELEPHONE ENCOUNTER
Pt's son states the pt needs an Rx for Constulose   furosemide (LASIX) 20 MG tablet   Please send to [facility].

## 2024-05-14 ENCOUNTER — HOSPITAL ENCOUNTER (OUTPATIENT)
Facility: HOSPITAL | Age: 89
Setting detail: RECURRING SERIES
Discharge: HOME | End: 2024-05-17

## 2024-05-21 ENCOUNTER — TELEPHONE (OUTPATIENT)
Facility: CLINIC | Age: 89
End: 2024-05-21

## 2024-05-21 DIAGNOSIS — B36.9 FUNGAL SKIN INFECTION: ICD-10-CM

## 2024-05-21 DIAGNOSIS — I10 PRIMARY HYPERTENSION: ICD-10-CM

## 2024-05-21 RX ORDER — CLOTRIMAZOLE AND BETAMETHASONE DIPROPIONATE 10; .64 MG/G; MG/G
1 CREAM TOPICAL 2 TIMES DAILY
Qty: 45 G | Refills: 1 | Status: SHIPPED | OUTPATIENT
Start: 2024-05-21

## 2024-05-21 RX ORDER — FUROSEMIDE 20 MG/1
20 TABLET ORAL DAILY PRN
Qty: 90 TABLET | Refills: 1 | Status: SHIPPED | OUTPATIENT
Start: 2024-05-21

## 2024-05-21 NOTE — TELEPHONE ENCOUNTER
clotrimazole-betamethasone (LOTRISONE) 1-0.05 % cream   furosemide (LASIX) 20 MG tablet (pharmacy did not receive)  Please send to [facility].

## 2024-06-03 NOTE — PROGRESS NOTES
Misael Myers was informed of the inherent limitations of a virtual visit,  and has consented to a virtual therapy visit on 6/3/2024.  Information regarding emergency contact information for this patient during this visit is to contact:  Dolores Soliman at [phone]  in addition to calling 911.  The patient was informed that at any time during the virtual visit, they can decide to stop the virtual visit.  The patient verified that they are physically located in the [facility] for this virtual visit.         NUTRITION - FOLLOW-UP TREATMENT NOTE  Patient Name: Misael Myers         Date: 2024  : 1934    YES Patient  Verified  Diagnosis: E11.22, N18.30 (ICD-10-CM) - Controlled type 2 diabetes mellitus with stage 3 chronic kidney disease, without long-term current use of insulin (HCC)    In time:   1:00PM             Out time:   1:11PM   Total Treatment Time (min):   11     SUBJECTIVE/ASSESSMENT  Current Wt: N/A Previous Wt: 208 Wt Change: N/A     Initial Wt: 208 Total Wt change: N/A Height: 61     Changes in medication or medical history? Any new allergies, surgeries or procedures?    NO    If yes, update Summary List          Nutrition Diagnosis        Diagnosis Status: Food and nutrition related knowledge deficit R/T lack of prior education for diabetes and nutrition AEB pt questions during session.   []  Improved []  No Change    []  Declined   []  Discontinued     Excessive carbohydrate intake R/T food and nutrition related knowledge deficit for diabetes nutrition AEB recall showing preference for sweet drinks and increased carbohydrate food intake leading to elevated A1c of 10%.   []  Improved []  No Change    []  Declined   []  Discontinued        Nutrition Monitoring and Evaluation: Pt seen virtually today for follow-up visit. Pt's son is on the call today. He states he is limiting added sugar and have swapped high sugar juices for low sugar options. Wt stable for the last few months.

## 2024-06-04 ENCOUNTER — HOSPITAL ENCOUNTER (OUTPATIENT)
Facility: HOSPITAL | Age: 89
Setting detail: RECURRING SERIES
Discharge: HOME | End: 2024-06-07
Payer: MEDICARE

## 2024-06-04 PROCEDURE — 97803 MED NUTRITION INDIV SUBSEQ: CPT

## 2024-07-15 ENCOUNTER — TELEPHONE (OUTPATIENT)
Facility: CLINIC | Age: 89
End: 2024-07-15

## 2024-07-15 NOTE — TELEPHONE ENCOUNTER
BLAKE    Pt 's daughter called to report that she has concerns about pt's overall condition as pt has become more lethargic and is very weak.     At pt's appt on 7/22, Dolores would like to discuss hospice care. Also it has been getting difficult trying to transport pt, so pt may need to do a virtual visit. Dolores will call the office on Friday if they need to switch to a virtual visit.

## 2024-07-16 ENCOUNTER — TELEPHONE (OUTPATIENT)
Facility: CLINIC | Age: 89
End: 2024-07-16

## 2024-07-16 DIAGNOSIS — B36.9 FUNGAL SKIN INFECTION: ICD-10-CM

## 2024-07-17 RX ORDER — CLOTRIMAZOLE AND BETAMETHASONE DIPROPIONATE 10; .64 MG/G; MG/G
1 CREAM TOPICAL 2 TIMES DAILY
Qty: 45 G | Refills: 1 | Status: SHIPPED | OUTPATIENT
Start: 2024-07-17

## 2024-07-23 ENCOUNTER — TELEPHONE (OUTPATIENT)
Facility: CLINIC | Age: 89
End: 2024-07-23

## 2024-07-23 DIAGNOSIS — K59.00 CONSTIPATION, UNSPECIFIED CONSTIPATION TYPE: ICD-10-CM

## 2024-07-23 RX ORDER — LACTULOSE 10 G/15ML
30 SOLUTION ORAL 2 TIMES DAILY
Qty: 946 ML | Refills: 1 | Status: SHIPPED | OUTPATIENT
Start: 2024-07-23

## 2024-07-23 NOTE — TELEPHONE ENCOUNTER
Pt  request a refill on the below medication. Please send to [facility] in [place].             lactulose (CHRONULAC) 10 GM/15ML solution

## 2024-07-26 ENCOUNTER — HOSPITAL ENCOUNTER (OUTPATIENT)
Facility: HOSPITAL | Age: 89
Setting detail: RECURRING SERIES
Discharge: HOME | End: 2024-07-29
Payer: MEDICARE

## 2024-07-26 PROCEDURE — 97803 MED NUTRITION INDIV SUBSEQ: CPT

## 2024-07-26 NOTE — PROGRESS NOTES
Misael Myers was informed of the inherent limitations of a virtual visit,  and has consented to a virtual therapy visit on 2024.  Information regarding emergency contact information for this patient during this visit is to contact:  Dolores Soliman at [phone]  in addition to calling 911.  The patient was informed that at any time during the virtual visit, they can decide to stop the virtual visit.  The patient verified that they are physically located in the [facility] for this virtual visit.         NUTRITION - FOLLOW-UP TREATMENT NOTE  Patient Name: Misael Myers         Date: 2024  : 1934    YES Patient  Verified  Diagnosis: E11.22, N18.30 (ICD-10-CM) - Controlled type 2 diabetes mellitus with stage 3 chronic kidney disease, without long-term current use of insulin (HCC)    In time:   240PM             Out time:   257PM   Total Treatment Time (min):   17     SUBJECTIVE/ASSESSMENT  Current Wt: N/A Previous Wt: 208 Wt Change: N/A     Initial Wt: 208 Total Wt change: N/A Height: 61     Changes in medication or medical history? Any new allergies, surgeries or procedures?    NO    If yes, update Summary List          Nutrition Diagnosis        Diagnosis Status: Food and nutrition related knowledge deficit R/T lack of prior education for diabetes and nutrition AEB pt questions during session.   [x]  Improved []  No Change    []  Declined   []  Discontinued     Excessive carbohydrate intake R/T food and nutrition related knowledge deficit for diabetes nutrition AEB recall showing preference for sweet drinks and increased carbohydrate food intake leading to elevated A1c of 10%.   [x]  Improved []  No Change    []  Declined   []  Discontinued        Nutrition Monitoring and Evaluation: Pt seen virtually today for follow-up visit. Pt's son on the call today. Pt's family is considering option for hospice. She is getting more lethargic. Pt's son reports no changes in eating habits. They

## 2024-07-30 ENCOUNTER — OFFICE VISIT (OUTPATIENT)
Facility: CLINIC | Age: 89
End: 2024-07-30
Payer: MEDICARE

## 2024-07-30 VITALS
RESPIRATION RATE: 16 BRPM | TEMPERATURE: 96.9 F | SYSTOLIC BLOOD PRESSURE: 132 MMHG | OXYGEN SATURATION: 95 % | HEIGHT: 61 IN | HEART RATE: 95 BPM | WEIGHT: 191.2 LBS | DIASTOLIC BLOOD PRESSURE: 86 MMHG | BODY MASS INDEX: 36.1 KG/M2

## 2024-07-30 DIAGNOSIS — N18.30 CONTROLLED TYPE 2 DIABETES MELLITUS WITH STAGE 3 CHRONIC KIDNEY DISEASE, WITHOUT LONG-TERM CURRENT USE OF INSULIN (HCC): ICD-10-CM

## 2024-07-30 DIAGNOSIS — I10 PRIMARY HYPERTENSION: ICD-10-CM

## 2024-07-30 DIAGNOSIS — E11.22 CONTROLLED TYPE 2 DIABETES MELLITUS WITH STAGE 3 CHRONIC KIDNEY DISEASE, WITHOUT LONG-TERM CURRENT USE OF INSULIN (HCC): ICD-10-CM

## 2024-07-30 DIAGNOSIS — F03.B0 MODERATE DEMENTIA WITHOUT BEHAVIORAL DISTURBANCE, PSYCHOTIC DISTURBANCE, MOOD DISTURBANCE, OR ANXIETY, UNSPECIFIED DEMENTIA TYPE (HCC): ICD-10-CM

## 2024-07-30 DIAGNOSIS — R82.90 FOUL SMELLING URINE: Primary | ICD-10-CM

## 2024-07-30 LAB
BILIRUBIN, URINE, POC: NEGATIVE
BLOOD URINE, POC: NORMAL
GLUCOSE URINE, POC: NORMAL
KETONES, URINE, POC: NORMAL
LEUKOCYTE ESTERASE, URINE, POC: NORMAL
NITRITE, URINE, POC: NEGATIVE
PH, URINE, POC: 5 (ref 4.6–8)
PROTEIN,URINE, POC: NORMAL
SPECIFIC GRAVITY, URINE, POC: 1.01 (ref 1–1.03)
URINALYSIS CLARITY, POC: NORMAL
URINALYSIS COLOR, POC: NORMAL
UROBILINOGEN, POC: NORMAL

## 2024-07-30 PROCEDURE — 99214 OFFICE O/P EST MOD 30 MIN: CPT | Performed by: FAMILY MEDICINE

## 2024-07-30 PROCEDURE — 1123F ACP DISCUSS/DSCN MKR DOCD: CPT | Performed by: FAMILY MEDICINE

## 2024-07-30 PROCEDURE — G8417 CALC BMI ABV UP PARAM F/U: HCPCS | Performed by: FAMILY MEDICINE

## 2024-07-30 PROCEDURE — 1036F TOBACCO NON-USER: CPT | Performed by: FAMILY MEDICINE

## 2024-07-30 PROCEDURE — 3046F HEMOGLOBIN A1C LEVEL >9.0%: CPT | Performed by: FAMILY MEDICINE

## 2024-07-30 PROCEDURE — 1090F PRES/ABSN URINE INCON ASSESS: CPT | Performed by: FAMILY MEDICINE

## 2024-07-30 PROCEDURE — G8427 DOCREV CUR MEDS BY ELIG CLIN: HCPCS | Performed by: FAMILY MEDICINE

## 2024-07-30 PROCEDURE — 81001 URINALYSIS AUTO W/SCOPE: CPT | Performed by: FAMILY MEDICINE

## 2024-07-30 SDOH — ECONOMIC STABILITY: FOOD INSECURITY: WITHIN THE PAST 12 MONTHS, YOU WORRIED THAT YOUR FOOD WOULD RUN OUT BEFORE YOU GOT MONEY TO BUY MORE.: NEVER TRUE

## 2024-07-30 SDOH — ECONOMIC STABILITY: INCOME INSECURITY: HOW HARD IS IT FOR YOU TO PAY FOR THE VERY BASICS LIKE FOOD, HOUSING, MEDICAL CARE, AND HEATING?: NOT VERY HARD

## 2024-07-30 SDOH — ECONOMIC STABILITY: FOOD INSECURITY: WITHIN THE PAST 12 MONTHS, THE FOOD YOU BOUGHT JUST DIDN'T LAST AND YOU DIDN'T HAVE MONEY TO GET MORE.: NEVER TRUE

## 2024-07-30 ASSESSMENT — ENCOUNTER SYMPTOMS
CHEST TIGHTNESS: 0
ABDOMINAL PAIN: 0
ABDOMINAL DISTENTION: 0
APNEA: 0

## 2024-07-30 NOTE — PROGRESS NOTES
[facility]    History of Present Illness:   Misael Myers is a 89 y.o. female with history of HTN, Diabetes, Meningioma   CC: Follow up  History provided by patient and Records    HPI:  Hypertension Follow up:  The patient reports:  taking medications as instructed, no medication side effects noted, no TIA's, no chest pain on exertion, no dyspnea on exertion, no swelling of ankles, no orthostatic dizziness or lightheadedness, no orthopnea or paroxysmal nocturnal dyspnea.  Is taking Lasix  BP Readings from Last 3 Encounters:   07/30/24 132/86   03/18/24 134/81   01/18/24 (!) 142/86      Moderate Dementia: Noting Continuing to decline, is consistently incontinent at this time and has been using a purewick system at home.  Noting has required use of ed lift for mobility.  Seems to have trouble with swallowing as well.  Will roll food in mouth before swallowing.  Has been taking pills.  Family is interested in discussing Hospice.    Diabetes Follow up: Family wants to try Gluco-6              Current Medications: This patient does not have an active medication from one of the medication groupers.  Insulin dependence: No              Frequency of home glucose testing: PRN              Blood Sugar range at home: None                Last eye exam: In past 12 months.              Last foot exam: This year.              Polyuria, polyphagia or polydipsia: Yes              Retinopathy: No              Neuropathy SX: No              Low blood sugar symptoms: No              Dietary compliance: compliant most of the time              Medication compliance: compliant most of the time              On ASA: No              Tobacco Use: No              Depression: No     Wt Readings from Last 3 Encounters:   07/30/24 86.7 kg (191 lb 3.2 oz)   03/18/24 94.7 kg (208 lb 12.8 oz)   01/18/24 96.9 kg (213 lb 9.6 oz)        Hemoglobin A1C   Date Value Ref Range Status   03/18/2024 10.0 (H) 4.0 - 5.6 % Final

## 2024-07-31 LAB
ALBUMIN SERPL-MCNC: 3.3 G/DL (ref 3.5–5)
ALBUMIN/GLOB SERPL: 0.8 (ref 1.1–2.2)
ALP SERPL-CCNC: 99 U/L (ref 45–117)
ALT SERPL-CCNC: 20 U/L (ref 12–78)
ANION GAP SERPL CALC-SCNC: 11 MMOL/L (ref 5–15)
AST SERPL-CCNC: 14 U/L (ref 15–37)
CALCIUM SERPL-MCNC: 11.1 MG/DL (ref 8.5–10.1)
CHOLEST SERPL-MCNC: 169 MG/DL
CO2 SERPL-SCNC: 23 MMOL/L (ref 21–32)
CREAT SERPL-MCNC: 1.12 MG/DL (ref 0.55–1.02)
ERYTHROCYTE [DISTWIDTH] IN BLOOD BY AUTOMATED COUNT: 13 % (ref 11.5–14.5)
EST. AVERAGE GLUCOSE BLD GHB EST-MCNC: 260 MG/DL
GLOBULIN SER CALC-MCNC: 4 G/DL (ref 2–4)
GLUCOSE SERPL-MCNC: 573 MG/DL (ref 65–100)
HBA1C MFR BLD: 10.7 % (ref 4–5.6)
HCT VFR BLD AUTO: 44.5 % (ref 35–47)
HDLC SERPL-MCNC: 34 MG/DL
HDLC SERPL: 5 (ref 0–5)
HGB BLD-MCNC: 14.5 G/DL (ref 11.5–16)
LDLC SERPL CALC-MCNC: 91.4 MG/DL (ref 0–100)
MCH RBC QN AUTO: 31.4 PG (ref 26–34)
MCHC RBC AUTO-ENTMCNC: 32.6 G/DL (ref 30–36.5)
MCV RBC AUTO: 96.3 FL (ref 80–99)
NRBC # BLD: 0 K/UL (ref 0–0.01)
NRBC BLD-RTO: 0 PER 100 WBC
PLATELET # BLD AUTO: 329 K/UL (ref 150–400)
PMV BLD AUTO: 11.4 FL (ref 8.9–12.9)
POTASSIUM SERPL-SCNC: 4.7 MMOL/L (ref 3.5–5.1)
RBC # BLD AUTO: 4.62 M/UL (ref 3.8–5.2)
SODIUM SERPL-SCNC: 132 MMOL/L (ref 136–145)
VLDLC SERPL CALC-MCNC: 43.6 MG/DL
WBC # BLD AUTO: 11.8 K/UL (ref 3.6–11)

## 2024-08-01 LAB
BACTERIA SPEC CULT: NORMAL
CC UR VC: NORMAL
SERVICE CMNT-IMP: NORMAL

## 2024-08-06 ENCOUNTER — TELEPHONE (OUTPATIENT)
Facility: CLINIC | Age: 89
End: 2024-08-06

## 2024-08-06 NOTE — TELEPHONE ENCOUNTER
Thank you for the update, much appreciated.    Noé Scott MD  [facility]  08/06/24

## 2024-08-06 NOTE — TELEPHONE ENCOUNTER
Sherrie from [facility] called to report that pt passed away last night @ 8:16 pm.